# Patient Record
Sex: MALE | Race: BLACK OR AFRICAN AMERICAN | NOT HISPANIC OR LATINO | Employment: OTHER | URBAN - METROPOLITAN AREA
[De-identification: names, ages, dates, MRNs, and addresses within clinical notes are randomized per-mention and may not be internally consistent; named-entity substitution may affect disease eponyms.]

---

## 2017-04-11 ENCOUNTER — TELEPHONE (OUTPATIENT)
Dept: FAMILY MEDICINE | Facility: CLINIC | Age: 82
End: 2017-04-11

## 2017-04-11 NOTE — TELEPHONE ENCOUNTER
Spoke with patient and explained to him that no provider that has never seen him before is going to prescribe Zolpidem to him.  Advised that he is going to have to get his refills from the provider that he was established with until he can establish care with Dr. Berger.  Patient verbalizes understanding and states her thinks that doctor is in clinic tomorrow and he will call their office.

## 2017-04-11 NOTE — TELEPHONE ENCOUNTER
----- Message from Emily Moreno sent at 4/11/2017  3:05 PM CDT -----  Contact: call  [phone]  RX request - refill or new RX.  Is this a refill or new RX:  New   RX name and strength: zolpidem (AMBIEN) 10 mg Tab  Directions:   Is this a 30 day or 90 day RX:  30 day   Pharmacy name and phone #: [facility]  Phone [phone]  Comments:  Pt is scheduled for a np appt on 05/10/17

## 2017-05-10 ENCOUNTER — OFFICE VISIT (OUTPATIENT)
Dept: FAMILY MEDICINE | Facility: CLINIC | Age: 82
End: 2017-05-10
Payer: MEDICARE

## 2017-05-10 ENCOUNTER — LAB VISIT (OUTPATIENT)
Dept: LAB | Facility: HOSPITAL | Age: 82
End: 2017-05-10
Attending: INTERNAL MEDICINE
Payer: MEDICARE

## 2017-05-10 VITALS
SYSTOLIC BLOOD PRESSURE: 130 MMHG | WEIGHT: 156.5 LBS | TEMPERATURE: 98 F | HEIGHT: 65 IN | DIASTOLIC BLOOD PRESSURE: 70 MMHG | HEART RATE: 78 BPM | BODY MASS INDEX: 26.08 KG/M2

## 2017-05-10 DIAGNOSIS — E55.9 VITAMIN D DEFICIENCY: ICD-10-CM

## 2017-05-10 DIAGNOSIS — I25.10 CORONARY ARTERY DISEASE INVOLVING NATIVE CORONARY ARTERY OF NATIVE HEART WITHOUT ANGINA PECTORIS: ICD-10-CM

## 2017-05-10 DIAGNOSIS — Z00.00 ANNUAL PHYSICAL EXAM: Primary | ICD-10-CM

## 2017-05-10 DIAGNOSIS — I10 ESSENTIAL HYPERTENSION: ICD-10-CM

## 2017-05-10 DIAGNOSIS — G47.00 INSOMNIA, UNSPECIFIED TYPE: ICD-10-CM

## 2017-05-10 DIAGNOSIS — K59.00 CONSTIPATION, UNSPECIFIED CONSTIPATION TYPE: ICD-10-CM

## 2017-05-10 DIAGNOSIS — M17.11 PRIMARY OSTEOARTHRITIS OF RIGHT KNEE: ICD-10-CM

## 2017-05-10 DIAGNOSIS — Z01.00 EYE EXAM, ROUTINE: ICD-10-CM

## 2017-05-10 DIAGNOSIS — Z85.819 HISTORY OF THROAT CANCER: ICD-10-CM

## 2017-05-10 DIAGNOSIS — K21.9 GASTROESOPHAGEAL REFLUX DISEASE, ESOPHAGITIS PRESENCE NOT SPECIFIED: ICD-10-CM

## 2017-05-10 DIAGNOSIS — N40.0 BENIGN PROSTATIC HYPERPLASIA, PRESENCE OF LOWER URINARY TRACT SYMPTOMS UNSPECIFIED, UNSPECIFIED MORPHOLOGY: ICD-10-CM

## 2017-05-10 LAB
25(OH)D3+25(OH)D2 SERPL-MCNC: 25 NG/ML
ALBUMIN SERPL BCP-MCNC: 3.6 G/DL
ALP SERPL-CCNC: 152 U/L
ALT SERPL W/O P-5'-P-CCNC: 11 U/L
ANION GAP SERPL CALC-SCNC: 9 MMOL/L
ANISOCYTOSIS BLD QL SMEAR: SLIGHT
AST SERPL-CCNC: 23 U/L
BASOPHILS # BLD AUTO: 0.06 K/UL
BASOPHILS NFR BLD: 0.6 %
BILIRUB SERPL-MCNC: 0.5 MG/DL
BUN SERPL-MCNC: 17 MG/DL
BURR CELLS BLD QL SMEAR: ABNORMAL
CALCIUM SERPL-MCNC: 9.1 MG/DL
CHLORIDE SERPL-SCNC: 108 MMOL/L
CHOLEST/HDLC SERPL: 3.6 {RATIO}
CO2 SERPL-SCNC: 22 MMOL/L
CREAT SERPL-MCNC: 2.2 MG/DL
DIFFERENTIAL METHOD: ABNORMAL
EOSINOPHIL # BLD AUTO: 0.3 K/UL
EOSINOPHIL NFR BLD: 2.8 %
ERYTHROCYTE [DISTWIDTH] IN BLOOD BY AUTOMATED COUNT: 16.4 %
EST. GFR  (AFRICAN AMERICAN): 31.3 ML/MIN/1.73 M^2
EST. GFR  (NON AFRICAN AMERICAN): 27.1 ML/MIN/1.73 M^2
GIANT PLATELETS BLD QL SMEAR: PRESENT
GLUCOSE SERPL-MCNC: 94 MG/DL
HCT VFR BLD AUTO: 42.7 %
HDL/CHOLESTEROL RATIO: 28 %
HDLC SERPL-MCNC: 132 MG/DL
HDLC SERPL-MCNC: 37 MG/DL
HGB BLD-MCNC: 13.4 G/DL
LDLC SERPL CALC-MCNC: 69.4 MG/DL
LYMPHOCYTES # BLD AUTO: 1.1 K/UL
LYMPHOCYTES NFR BLD: 11 %
MCH RBC QN AUTO: 23.8 PG
MCHC RBC AUTO-ENTMCNC: 31.4 %
MCV RBC AUTO: 76 FL
MONOCYTES # BLD AUTO: 0.7 K/UL
MONOCYTES NFR BLD: 6.7 %
NEUTROPHILS # BLD AUTO: 7.6 K/UL
NEUTROPHILS NFR BLD: 78.9 %
NONHDLC SERPL-MCNC: 95 MG/DL
OVALOCYTES BLD QL SMEAR: ABNORMAL
PLATELET # BLD AUTO: 429 K/UL
PLATELET BLD QL SMEAR: ABNORMAL
PMV BLD AUTO: 11.3 FL
POIKILOCYTOSIS BLD QL SMEAR: SLIGHT
POTASSIUM SERPL-SCNC: 4 MMOL/L
PROT SERPL-MCNC: 6.6 G/DL
RBC # BLD AUTO: 5.63 M/UL
SODIUM SERPL-SCNC: 139 MMOL/L
TRIGL SERPL-MCNC: 128 MG/DL
WBC # BLD AUTO: 9.66 K/UL

## 2017-05-10 PROCEDURE — 3078F DIAST BP <80 MM HG: CPT | Mod: S$GLB,,, | Performed by: INTERNAL MEDICINE

## 2017-05-10 PROCEDURE — 3075F SYST BP GE 130 - 139MM HG: CPT | Mod: S$GLB,,, | Performed by: INTERNAL MEDICINE

## 2017-05-10 PROCEDURE — 1160F RVW MEDS BY RX/DR IN RCRD: CPT | Mod: S$GLB,,, | Performed by: INTERNAL MEDICINE

## 2017-05-10 PROCEDURE — 99204 OFFICE O/P NEW MOD 45 MIN: CPT | Mod: S$GLB,,, | Performed by: INTERNAL MEDICINE

## 2017-05-10 PROCEDURE — 99999 PR PBB SHADOW E&M-EST. PATIENT-LVL III: CPT | Mod: PBBFAC,,, | Performed by: INTERNAL MEDICINE

## 2017-05-10 PROCEDURE — 36415 COLL VENOUS BLD VENIPUNCTURE: CPT | Mod: PO

## 2017-05-10 PROCEDURE — 85025 COMPLETE CBC W/AUTO DIFF WBC: CPT

## 2017-05-10 PROCEDURE — 1126F AMNT PAIN NOTED NONE PRSNT: CPT | Mod: S$GLB,,, | Performed by: INTERNAL MEDICINE

## 2017-05-10 PROCEDURE — 80053 COMPREHEN METABOLIC PANEL: CPT

## 2017-05-10 PROCEDURE — 1159F MED LIST DOCD IN RCRD: CPT | Mod: S$GLB,,, | Performed by: INTERNAL MEDICINE

## 2017-05-10 PROCEDURE — 82306 VITAMIN D 25 HYDROXY: CPT

## 2017-05-10 PROCEDURE — 80061 LIPID PANEL: CPT

## 2017-05-10 RX ORDER — CLOPIDOGREL BISULFATE 75 MG/1
75 TABLET ORAL DAILY
Qty: 90 TABLET | Refills: 3 | Status: SHIPPED | OUTPATIENT
Start: 2017-05-10 | End: 2018-01-01 | Stop reason: SDUPTHER

## 2017-05-10 RX ORDER — BENAZEPRIL HYDROCHLORIDE 20 MG/1
20 TABLET ORAL DAILY
COMMUNITY
End: 2017-05-10 | Stop reason: SDUPTHER

## 2017-05-10 RX ORDER — BENAZEPRIL HYDROCHLORIDE 20 MG/1
20 TABLET ORAL DAILY
Qty: 90 TABLET | Refills: 3 | Status: ON HOLD | OUTPATIENT
Start: 2017-05-10 | End: 2018-01-01 | Stop reason: HOSPADM

## 2017-05-10 RX ORDER — OMEPRAZOLE 20 MG/1
20 CAPSULE, DELAYED RELEASE ORAL DAILY
COMMUNITY
Start: 2017-04-17 | End: 2017-06-09 | Stop reason: SDUPTHER

## 2017-05-10 RX ORDER — PRAVASTATIN SODIUM 40 MG/1
40 TABLET ORAL DAILY
COMMUNITY
End: 2017-05-10 | Stop reason: SDUPTHER

## 2017-05-10 RX ORDER — AMLODIPINE BESYLATE 10 MG/1
10 TABLET ORAL DAILY
Qty: 90 TABLET | Refills: 3 | Status: SHIPPED | OUTPATIENT
Start: 2017-05-10 | End: 2018-01-18 | Stop reason: SDUPTHER

## 2017-05-10 RX ORDER — ZOLPIDEM TARTRATE 5 MG/1
5 TABLET ORAL NIGHTLY PRN
Qty: 90 TABLET | Refills: 3 | Status: SHIPPED | OUTPATIENT
Start: 2017-05-10 | End: 2017-07-26 | Stop reason: ALTCHOICE

## 2017-05-10 RX ORDER — AMLODIPINE BESYLATE 10 MG/1
10 TABLET ORAL DAILY
COMMUNITY
End: 2017-05-10 | Stop reason: SDUPTHER

## 2017-05-10 RX ORDER — TAMSULOSIN HYDROCHLORIDE 0.4 MG/1
0.4 CAPSULE ORAL DAILY
Qty: 90 CAPSULE | Refills: 3 | Status: SHIPPED | OUTPATIENT
Start: 2017-05-10 | End: 2018-01-01 | Stop reason: SDUPTHER

## 2017-05-10 RX ORDER — METOPROLOL SUCCINATE 50 MG/1
50 TABLET, EXTENDED RELEASE ORAL DAILY
Qty: 90 TABLET | Refills: 3 | Status: SHIPPED | OUTPATIENT
Start: 2017-05-10 | End: 2018-01-01 | Stop reason: HOSPADM

## 2017-05-10 RX ORDER — PRAVASTATIN SODIUM 40 MG/1
40 TABLET ORAL DAILY
Qty: 90 TABLET | Refills: 3 | Status: SHIPPED | OUTPATIENT
Start: 2017-05-10 | End: 2018-01-01 | Stop reason: SDUPTHER

## 2017-05-10 NOTE — MR AVS SNAPSHOT
[facility]  [address] [facility], [address]  [facility] [address]  Phone: [phone]  Fax: [phone]                  Neville Anthony   5/10/2017 9:00 AM   Office Visit    Description:  Male : 1935   Provider:  Liberty Berger MD   Department:  [facility]           Reason for Visit     Annual Exam     Establish Care           Diagnoses this Visit        Comments    Annual physical exam    -  Primary     History of throat cancer         Coronary artery disease involving native coronary artery of native heart without angina pectoris         Insomnia, unspecified type         Primary osteoarthritis of right knee         Benign prostatic hyperplasia, presence of lower urinary tract symptoms unspecified, unspecified morphology         Essential hypertension         Gastroesophageal reflux disease, esophagitis presence not specified         Vitamin D deficiency         Eye exam, routine         Constipation, unspecified constipation type                To Do List           Goals (5 Years of Data)     None      Follow-Up and Disposition     Return for Follow up pending lab results and review of outside medical records.       These Medications        Disp Refills Start End    amlodipine (NORVASC) 10 MG tablet 90 tablet 3 5/10/2017 5/10/2018    Take 1 tablet (10 mg total) by mouth once daily. - Oral    [facility] - [address] [facility] Ph #: [phone]       benazepril (LOTENSIN) 20 MG tablet 90 tablet 3 5/10/2017 5/10/2018    Take 1 tablet (20 mg total) by mouth once daily. - Oral    Pharmacy: [facility] - [address] [facility] Ph #: [phone]       clopidogrel (PLAVIX) 75 mg tablet 90 tablet 3 5/10/2017 5/10/2018    Take 1 tablet (75 mg total) by mouth once daily. - Oral    Pharmacy: [facility]  - [address] #: [phone]       metoprolol succinate (TOPROL-XL) 50 MG 24 hr tablet 90 tablet 3 5/10/2017 5/10/2018    Take 1 tablet (50 mg total) by mouth once daily. - Oral    Pharmacy: [address] #: [phone]       pravastatin (PRAVACHOL) 40 MG tablet 90 tablet 3 5/10/2017 5/10/2018    Take 1 tablet (40 mg total) by mouth once daily. - Oral    Pharmacy: [address] #: [phone]       tamsulosin (FLOMAX) 0.4 mg Cp24 90 capsule 3 5/10/2017 5/10/2018    Take 1 capsule (0.4 mg total) by mouth once daily. - Oral    Pharmacy: [address] #: [phone]       zolpidem (AMBIEN) 5 MG Tab 90 tablet 3 5/10/2017 5/10/2018    Take 1 tablet (5 mg total) by mouth nightly as needed (insomnia). - Oral    Pharmacy: [address] #: [phone]         [facility] On [facility]     [facility] Nurse Care Line - 24/7 Assistance  Unless otherwise directed by your provider, please contact [facility], our nurse care line that is available for 24/7 assistance.     Registered nurses in the [facility] Center provide: appointment scheduling, clinical advisement, health education, and other advisory services.  Call: [phone] (toll free)               Medications           Message regarding Medications     Verify the changes and/or additions to your medication regime listed below are the same as discussed with your clinician today.  If any of these changes or additions are incorrect, please notify your healthcare provider.        START taking these NEW medications        Refills    amlodipine  (NORVASC) 10 MG tablet 3    Sig: Take 1 tablet (10 mg total) by mouth once daily.    Class: Normal    Route: Oral    benazepril (LOTENSIN) 20 MG tablet 3    Sig: Take 1 tablet (20 mg total) by mouth once daily.    Class: Normal    Route: Oral    pravastatin (PRAVACHOL) 40 MG tablet 3    Sig: Take 1 tablet (40 mg total) by mouth once daily.    Class: Normal    Route: Oral      CHANGE how you are taking these medications     Start Taking Instead of    clopidogrel (PLAVIX) 75 mg tablet clopidogrel (PLAVIX) 75 mg tablet    Dosage:  Take 1 tablet (75 mg total) by mouth once daily. Dosage:  Take 75 mg by mouth once daily.    Reason for Change:  Reorder     metoprolol succinate (TOPROL-XL) 50 MG 24 hr tablet metoprolol succinate (TOPROL-XL) 50 MG 24 hr tablet    Dosage:  Take 1 tablet (50 mg total) by mouth once daily. Dosage:  Take 50 mg by mouth once daily.    Reason for Change:  Reorder     tamsulosin (FLOMAX) 0.4 mg Cp24 tamsulosin (FLOMAX) 0.4 mg Cp24    Dosage:  Take 1 capsule (0.4 mg total) by mouth once daily. Dosage:  Take 0.4 mg by mouth once daily.    Reason for Change:  Reorder     zolpidem (AMBIEN) 5 MG Tab zolpidem (AMBIEN) 10 mg Tab    Dosage:  Take 1 tablet (5 mg total) by mouth nightly as needed (insomnia). Dosage:  Take 5 mg by mouth nightly as needed.    Reason for Change:  Reorder       STOP taking these medications     allopurinol (ZYLOPRIM) 100 MG tablet Take 100 mg by mouth once daily.    simvastatin (ZOCOR) 40 MG tablet Take 40 mg by mouth every evening.    pantoprazole (PROTONIX) 40 MG tablet Take 40 mg by mouth once daily.           Verify that the below list of medications is an accurate representation of the medications you are currently taking.  If none reported, the list may be blank. If incorrect, please contact your healthcare provider. Carry this list with you in case of emergency.           Current Medications     amlodipine (NORVASC) 10 MG tablet Take 1 tablet (10 mg total) by mouth once  "daily.    aspirin 81 MG Chew Take 81 mg by mouth once daily.    benazepril (LOTENSIN) 20 MG tablet Take 1 tablet (20 mg total) by mouth once daily.    clopidogrel (PLAVIX) 75 mg tablet Take 1 tablet (75 mg total) by mouth once daily.    metoprolol succinate (TOPROL-XL) 50 MG 24 hr tablet Take 1 tablet (50 mg total) by mouth once daily.    omeprazole (PRILOSEC) 20 MG capsule Take 20 mg by mouth once daily at 6am.    pravastatin (PRAVACHOL) 40 MG tablet Take 1 tablet (40 mg total) by mouth once daily.    tamsulosin (FLOMAX) 0.4 mg Cp24 Take 1 capsule (0.4 mg total) by mouth once daily.    zolpidem (AMBIEN) 5 MG Tab Take 1 tablet (5 mg total) by mouth nightly as needed (insomnia).           Clinical Reference Information           Your Vitals Were     BP Pulse Temp Height Weight BMI    130/70 78 98 °F (36.7 °C) 5' 5" (1.651 m) 71 kg (156 lb 8.4 oz) 26.05 kg/m2      Blood Pressure          Most Recent Value    BP  130/70      Allergies as of 5/10/2017     No Known Allergies      Immunizations Administered on Date of Encounter - 5/10/2017     None      Orders Placed During Today's Visit      Normal Orders This Visit    Ambulatory referral to Optometry     Future Labs/Procedures Expected by Expires    CBC auto differential  5/10/2017 7/9/2018    Comprehensive metabolic panel  5/10/2017 7/9/2018    Lipid panel  5/10/2017 7/9/2018    Vitamin D  5/10/2017 7/9/2018      Language Assistance Services     ATTENTION: Language assistance services are available, free of charge. Please call [phone].      ATENCIÓN: Si patiencela martha, tiene a whiting disposición servicios gratuitos de asistencia lingüística. Llame al [phone].     MAHAD Ý: N?u b?n nói Ti?ng Vi?t, có các d?ch v? h? tr? ngôn ng? mi?n phí dành cho b?n. G?i s? [phone].         [facility] complies with applicable Federal civil rights laws and does not discriminate on the basis of race, color, national origin, age, disability, or sex.        "

## 2017-05-10 NOTE — PROGRESS NOTES
Subjective:        Patient ID: Neville Cruz is a 82 y.o. male.    Chief Complaint: Annual Exam and Establish Care    HPI   Neville Cruz presents for annual exam and to establish care.  Pt is accompanied by his daughter, Clemencia.    1. ?kidney problem: Pt reports he saw a kidney doctor once since moving to [facility] 1 year ago.  Unsure if he has CKD or what the specific issue is.  His follow up appt is in 3 months.    2. CAD: s/p heart attacks and stenting; last stent placed in LAD 9/2016 by Dr. Reilly in [facility].  Pt currently asymptomatic.  He has been on Plavix a long time with increased bruising but otherwise no problems.  He reports when he held Plavix for a c-scope in the past, he had a heart attack.    3. R knee OA: Pt reports pain with prolonged walking and activity.  He walks with a cane.  He has seen ortho and TKA was recommended but pt needs to follow up with cardiology regarding cardiac clearance given recent stenting, Plavix.    4. ?Acid reflux: Pt reports no hx of heartburn or reflux sx.  He followed up with ENT in [place] due to his hx of throat cancer (dx 1997), had a scope and was prescribed omeprazole.  He is almost finished with the course prescribed.    Review of Systems   Constitutional: Positive for unexpected weight change (gradual weight gain). Negative for activity change (no regular exercise due to knee problem).   HENT: Positive for voice change (hoarse due to h/o throat surgery for cancer). Negative for ear pain, hearing loss, sore throat and trouble swallowing.    Eyes: Positive for visual disturbance (bifocal glasses need updating).   Respiratory: Negative for chest tightness and shortness of breath.    Cardiovascular: Negative for chest pain and leg swelling.   Gastrointestinal: Positive for constipation (~2 BMs per week, sometimes strains to go). Negative for abdominal pain, blood in stool and diarrhea.   Genitourinary: Negative for difficulty urinating and hematuria.    Musculoskeletal: Positive for arthralgias (R knee pain due to OA). Negative for back pain and joint swelling.   Skin: Negative for rash.   Neurological: Negative for dizziness and light-headedness.   Hematological: Bruises/bleeds easily (2/2 Plavix).   Psychiatric/Behavioral: Positive for sleep disturbance (chronic insomnia, took Ambien 10mg for years, no side effects, can't sleep without it). Negative for dysphoric mood. The patient is not nervous/anxious.            Objective:        Vitals:    05/10/17 0855   BP: 130/70   Pulse: 78   Temp: 98 °F (36.7 °C)     Physical Exam   Constitutional: He is oriented to person, place, and time. He appears well-developed and well-nourished. No distress.   HENT:   Head: Normocephalic and atraumatic.   Right Ear: External ear normal.   Left Ear: External ear normal.   Nose: Nose normal.   Mouth/Throat: Oropharynx is clear and moist.   - bilateral ear canals clear, tympanic membranes visualized - normal color and light reflex  - hoarse   Eyes: Conjunctivae and EOM are normal.   Neck: Normal range of motion. Neck supple.   Cardiovascular: Normal rate, regular rhythm and normal heart sounds.    No murmur heard.  Pulmonary/Chest: Effort normal and breath sounds normal. No respiratory distress. He has no wheezes.   Abdominal: Soft. He exhibits no distension. There is no tenderness. There is no guarding.   Musculoskeletal: He exhibits no edema.   Neurological: He is alert and oriented to person, place, and time.   Skin: Skin is warm and dry. No rash noted. No erythema.   Psychiatric: He has a normal mood and affect. His behavior is normal. Judgment and thought content normal.   Vitals reviewed.          Assessment:         1. Annual physical exam    2. History of throat cancer    3. Coronary artery disease involving native coronary artery of native heart without angina pectoris    4. Insomnia, unspecified type    5. Primary osteoarthritis of right knee    6. Benign prostatic  hyperplasia, presence of lower urinary tract symptoms unspecified, unspecified morphology    7. Essential hypertension    8. Gastroesophageal reflux disease, esophagitis presence not specified    9. Vitamin D deficiency    10. Constipation, unspecified constipation type    11. Eye exam, routine              Plan:         Neville was seen today for annual exam and establish care.    Diagnoses and all orders for this visit:    Annual physical exam  - fasting labs today  - Discussed vaccinations, will get medical records from PCP in [place].  Pt has not had shingles vaccine, recommended getting it from pharmacy due to Medicare.    History of throat cancer: No acute issues.  Recommend finishing course of omeprazole prescribed by ENT then stop.  If heartburn or reflux sx develop, try treating with OTC antacids, H2 antagonists before resuming PPI.    Coronary artery disease involving native coronary artery of native heart without angina pectoris: No acute issues; continue Benazepril 20mg qd, Metoprolol 50mg qd, Pravastatin 40mg qd and Plavix 75mg qd.  -     CBC auto differential; Future  -     Lipid panel; Future  -     benazepril (LOTENSIN) 20 MG tablet; Take 1 tablet (20 mg total) by mouth once daily.  -     clopidogrel (PLAVIX) 75 mg tablet; Take 1 tablet (75 mg total) by mouth once daily.  -     metoprolol succinate (TOPROL-XL) 50 MG 24 hr tablet; Take 1 tablet (50 mg total) by mouth once daily.  -     pravastatin (PRAVACHOL) 40 MG tablet; Take 1 tablet (40 mg total) by mouth once daily.    Insomnia, unspecified type: Decrease dose to Ambien 5mg qhs PRN given age.  -     zolpidem (AMBIEN) 5 MG Tab; Take 1 tablet (5 mg total) by mouth nightly as needed (insomnia).    Primary osteoarthritis of right knee: Encouraged pt to do physical activity as tolerated, use cane when ambulating.  Follow up with ortho.  Counseled pt if possible, would recommend postponing elective surgery until pt has been 1 year out from last  stent placement.  Discussed holding Plavix for surgery in the future vs bridging with heparin if pt is high risk for ACS or other VTE (pt reports having a heart attack the last time Plavix was held for screening c-scope).  Discuss this with cardiologist.    Benign prostatic hyperplasia, presence of lower urinary tract symptoms unspecified, unspecified morphology: Controlled w Flomax qd.  -     tamsulosin (FLOMAX) 0.4 mg Cp24; Take 1 capsule (0.4 mg total) by mouth once daily.    Essential hypertension: Well controlled w amlodipine 10mg qd, benazepril 20mg qd and metoprolol 50mg qd.  -     Comprehensive metabolic panel; Future  -     CBC auto differential; Future  -     Lipid panel; Future  -     amlodipine (NORVASC) 10 MG tablet; Take 1 tablet (10 mg total) by mouth once daily.  -     benazepril (LOTENSIN) 20 MG tablet; Take 1 tablet (20 mg total) by mouth once daily.  -     metoprolol succinate (TOPROL-XL) 50 MG 24 hr tablet; Take 1 tablet (50 mg total) by mouth once daily.    Gastroesophageal reflux disease, esophagitis presence not specified: Pt denies sx.  Finish PPI course then stop.  Take OTC antacids if heartburn or reflux sx start after stopping PPI.    Vitamin D deficiency: Check Vit D level.  -     Vitamin D; Future    Constipation, unspecified constipation type: Pt and his daughter report pt does not drink enough water or eat vegetables.  Recommend more water, dietary fiber.  Also consider fiber powder supplement, probiotic and stool softener OTC.    Eye exam, routine  -     Ambulatory referral to Optometry        Follow up pending lab results.

## 2017-05-12 ENCOUNTER — TELEPHONE (OUTPATIENT)
Dept: FAMILY MEDICINE | Facility: CLINIC | Age: 82
End: 2017-05-12

## 2017-05-12 DIAGNOSIS — R74.8 ELEVATED ALKALINE PHOSPHATASE LEVEL: ICD-10-CM

## 2017-05-12 DIAGNOSIS — N18.30 CKD (CHRONIC KIDNEY DISEASE) STAGE 3, GFR 30-59 ML/MIN: ICD-10-CM

## 2017-05-12 DIAGNOSIS — M89.9 DISORDER OF BONE: ICD-10-CM

## 2017-05-12 DIAGNOSIS — D50.9 MICROCYTIC ANEMIA: ICD-10-CM

## 2017-05-12 NOTE — TELEPHONE ENCOUNTER
Called and notified pt of lab results and the followin. Mild microcytic anemia, thrombocytosis: Pt thinks last c-scope was in the past few years but unsure.  Will check FIT and FOBT.  Stool kit left at  for pt to .    2. Chronic kidney disease: Unknown baseline.  Nephrology appt in August.  Copy of lab results left with stool kit so pt can bring to this appt.      3. Elevated alk phos: Check abd US and DXA.  Pt will check with his daughter, Clemencia, who is driving him to appts and call to schedule or schedule when he comes to drop off stool sample.    4. Vit D insufficiency: Pt taking daily Vit D supplement.

## 2017-05-29 ENCOUNTER — TELEPHONE (OUTPATIENT)
Dept: FAMILY MEDICINE | Facility: CLINIC | Age: 82
End: 2017-05-29

## 2017-05-29 ENCOUNTER — HOSPITAL ENCOUNTER (OUTPATIENT)
Dept: RADIOLOGY | Facility: OTHER | Age: 82
Discharge: HOME | End: 2017-05-29
Attending: INTERNAL MEDICINE
Payer: MEDICARE

## 2017-05-29 DIAGNOSIS — R74.8 ELEVATED ALKALINE PHOSPHATASE LEVEL: ICD-10-CM

## 2017-05-29 PROCEDURE — 76700 US EXAM ABDOM COMPLETE: CPT | Mod: TC

## 2017-05-29 PROCEDURE — 76700 US EXAM ABDOM COMPLETE: CPT | Mod: 26,,, | Performed by: RADIOLOGY

## 2017-05-29 NOTE — TELEPHONE ENCOUNTER
Please advise pt and/or his daughter that his abdominal US showed gallstones.  A bone density scan was also ordered that pt needs to schedule.

## 2017-05-30 ENCOUNTER — TELEPHONE (OUTPATIENT)
Dept: FAMILY MEDICINE | Facility: CLINIC | Age: 82
End: 2017-05-30

## 2017-05-30 NOTE — TELEPHONE ENCOUNTER
Left message on patient's daughter phone to call and schedule bone density test, number to referral coordinator given. All advised of gallstones on ultrasound. Per

## 2017-05-31 ENCOUNTER — OFFICE VISIT (OUTPATIENT)
Dept: OPTOMETRY | Facility: CLINIC | Age: 82
End: 2017-05-31
Payer: MEDICARE

## 2017-05-31 DIAGNOSIS — H52.4 PRESBYOPIA OU: ICD-10-CM

## 2017-05-31 DIAGNOSIS — H25.13 NUCLEAR SCLEROSIS, BILATERAL: Primary | ICD-10-CM

## 2017-05-31 DIAGNOSIS — Z13.5 GLAUCOMA SCREENING: ICD-10-CM

## 2017-05-31 PROCEDURE — 92015 DETERMINE REFRACTIVE STATE: CPT | Mod: S$GLB,,, | Performed by: OPTOMETRIST

## 2017-05-31 PROCEDURE — 92004 COMPRE OPH EXAM NEW PT 1/>: CPT | Mod: S$GLB,,, | Performed by: OPTOMETRIST

## 2017-05-31 PROCEDURE — 99999 PR PBB SHADOW E&M-EST. PATIENT-LVL II: CPT | Mod: PBBFAC,,, | Performed by: OPTOMETRIST

## 2017-05-31 NOTE — PROGRESS NOTES
HPI     CHANDRA: 1 year ago   Pt states no noticeable va changes. Denies f/f    No gtts     Last edited by Martha Wilburn on 5/31/2017  9:12 AM. (History)        ROS     Positive for: Cardiovascular    Negative for: Constitutional, Gastrointestinal, Neurological, Skin,   Genitourinary, Musculoskeletal, HENT, Endocrine, Eyes, Respiratory,   Psychiatric, Allergic/Imm, Heme/Lymph    Last edited by Andrea Powers, OD on 5/31/2017  9:21 AM. (History)        Assessment /Plan     For exam results, see Encounter Report.    Nuclear sclerosis, bilateral    Glaucoma screening    Presbyopia OU      Reduced VA 2 to Cat OU--pt wishes surgery    PLAN:    Surgical consult

## 2017-05-31 NOTE — LETTER
May 31, 2017      Liberty Berger MD  [address]  [facility] [address]           [place] [facility]  [address] [facility]  [place] [address]  Phone: [phone]  Fax: [phone]          Patient: Neville Cruz   MR Number: [id number]   YOB: 1935   Date of Visit: 5/31/2017       Dear Dr. Liberty Berger:    Thank you for referring Neville Cruz to me for evaluation. Attached you will find relevant portions of my assessment and plan of care.    If you have questions, please do not hesitate to call me. I look forward to following Neville Cruz along with you.    Sincerely,    Andrea Powers, OD    Enclosure  CC:  No Recipients    If you would like to receive this communication electronically, please contact [email or web address] or [phone] to request more information on [facility] access.    For providers and/or their staff who would like to refer a patient to [facility], please contact us through our one-stop-shop provider referral line, [facility], at [phone].    If you feel you have received this communication in error or would no longer like to receive these types of communications, please e-mail [email or web address]

## 2017-06-01 ENCOUNTER — HOSPITAL ENCOUNTER (OUTPATIENT)
Dept: RADIOLOGY | Facility: OTHER | Age: 82
Discharge: HOME | End: 2017-06-01
Attending: INTERNAL MEDICINE
Payer: MEDICARE

## 2017-06-01 DIAGNOSIS — R74.8 ELEVATED ALKALINE PHOSPHATASE LEVEL: ICD-10-CM

## 2017-06-01 DIAGNOSIS — M89.9 DISORDER OF BONE: ICD-10-CM

## 2017-06-01 PROCEDURE — 77080 DXA BONE DENSITY AXIAL: CPT | Mod: TC

## 2017-06-01 PROCEDURE — 77080 DXA BONE DENSITY AXIAL: CPT | Mod: 26,,, | Performed by: RADIOLOGY

## 2017-06-06 ENCOUNTER — TELEPHONE (OUTPATIENT)
Dept: FAMILY MEDICINE | Facility: CLINIC | Age: 82
End: 2017-06-06

## 2017-06-06 DIAGNOSIS — M81.0 AGE-RELATED OSTEOPOROSIS WITHOUT CURRENT PATHOLOGICAL FRACTURE: Primary | ICD-10-CM

## 2017-06-06 NOTE — TELEPHONE ENCOUNTER
Attempted to call daughter in regards to patient's bone scan results. Spoke to patient told him about endocrinology referral. Patient verbalizes understanding. Will wait for daughter to call to schedule appointment.

## 2017-06-07 ENCOUNTER — TELEPHONE (OUTPATIENT)
Dept: FAMILY MEDICINE | Facility: CLINIC | Age: 82
End: 2017-06-07

## 2017-06-07 NOTE — TELEPHONE ENCOUNTER
Left message with date and place of endocrinologist appointment and phone number to call if she needed more info regarding appointment.

## 2017-06-07 NOTE — TELEPHONE ENCOUNTER
----- Message from Emily Moreno sent at 6/7/2017 12:11 PM CDT -----  Contact: call daughter at [phone]  work  Patient is returning a phone call.  Who left a message for the patient: bull   Does patient know what this is regarding:  Phone number given earlier is not working  Comments: please call and leave voice mail with correct phone number

## 2017-06-07 NOTE — TELEPHONE ENCOUNTER
Patient's daughter referral to endocrinologist to seek treatment for osteoporosis.Ms. Haddad verbalizes understanding and will call referral coordinator to schedule.

## 2017-06-09 DIAGNOSIS — R10.13 DYSPEPSIA: Primary | ICD-10-CM

## 2017-06-09 DIAGNOSIS — R10.13 DYSPEPSIA: ICD-10-CM

## 2017-06-09 RX ORDER — OMEPRAZOLE 20 MG/1
CAPSULE, DELAYED RELEASE ORAL
Qty: 90 CAPSULE | Refills: 0 | Status: SHIPPED | OUTPATIENT
Start: 2017-06-09 | End: 2017-09-05 | Stop reason: SDUPTHER

## 2017-06-09 RX ORDER — OMEPRAZOLE 20 MG/1
20 CAPSULE, DELAYED RELEASE ORAL DAILY
Qty: 30 CAPSULE | Refills: 2 | Status: SHIPPED | OUTPATIENT
Start: 2017-06-09 | End: 2017-06-09 | Stop reason: SDUPTHER

## 2017-06-09 NOTE — TELEPHONE ENCOUNTER
----- Message from Jory Roldan sent at 6/9/2017  1:27 PM CDT -----  Pt Neville Cruz, wants to know if we can call in a prescription for omeprazole 20 mg.  Pt is having heart burn again and needs the medication called into [facility].  Please call pt @ [phone] to advise

## 2017-06-09 NOTE — TELEPHONE ENCOUNTER
Spoke w/Pharmacy, pt does not have refills on this medication. Please refill and send to pharmacy--will inform pt when completed. lov with Dr Berger 5/10/17 Thank you.

## 2017-06-19 ENCOUNTER — OFFICE VISIT (OUTPATIENT)
Dept: OPHTHALMOLOGY | Facility: CLINIC | Age: 82
End: 2017-06-19
Payer: MEDICARE

## 2017-06-19 DIAGNOSIS — H52.7 REFRACTIVE ERROR: ICD-10-CM

## 2017-06-19 DIAGNOSIS — H26.9 CORTICAL CATARACT OF BOTH EYES: ICD-10-CM

## 2017-06-19 DIAGNOSIS — H25.13 NUCLEAR SCLEROSIS, BILATERAL: Primary | ICD-10-CM

## 2017-06-19 DIAGNOSIS — I10 BENIGN ESSENTIAL HTN: ICD-10-CM

## 2017-06-19 PROCEDURE — 99999 PR PBB SHADOW E&M-EST. PATIENT-LVL II: CPT | Mod: PBBFAC,,, | Performed by: OPHTHALMOLOGY

## 2017-06-19 PROCEDURE — 92014 COMPRE OPH EXAM EST PT 1/>: CPT | Mod: S$GLB,,, | Performed by: OPHTHALMOLOGY

## 2017-06-19 PROCEDURE — 99499 UNLISTED E&M SERVICE: CPT | Mod: S$GLB,,, | Performed by: OPHTHALMOLOGY

## 2017-06-19 PROCEDURE — 92136 OPHTHALMIC BIOMETRY: CPT | Mod: RT,S$GLB,, | Performed by: OPHTHALMOLOGY

## 2017-06-19 RX ORDER — NEPAFENAC 3 MG/ML
1 SUSPENSION/ DROPS OPHTHALMIC DAILY
Qty: 3 ML | Refills: 1 | Status: SHIPPED | OUTPATIENT
Start: 2017-07-15 | End: 2017-08-14

## 2017-06-19 RX ORDER — DIFLUPREDNATE OPHTHALMIC 0.5 MG/ML
1 EMULSION OPHTHALMIC 4 TIMES DAILY
Qty: 5 ML | Refills: 1 | Status: SHIPPED | OUTPATIENT
Start: 2017-07-18 | End: 2017-08-17

## 2017-06-19 RX ORDER — OFLOXACIN 3 MG/ML
1 SOLUTION/ DROPS OPHTHALMIC 4 TIMES DAILY
Qty: 5 ML | Refills: 1 | Status: SHIPPED | OUTPATIENT
Start: 2017-07-15 | End: 2017-07-25

## 2017-06-19 NOTE — PROGRESS NOTES
Subjective:       Patient ID: Neville Cruz is a 82 y.o. male.    Chief Complaint: Cataract (Cataract Eval per Dr. Powers )    HPI  Review of Systems    Objective:      Physical Exam    Assessment:       1. Nuclear sclerosis, bilateral    2. Cortical cataract of both eyes    3. Benign essential HTN    4. Refractive error        Plan:       Visually significant cataract OD -Pt. Wants Sx.     Cataract OS- Not visually significant.  HTN-No retinopathy OU.  RE          CE OD 7/18/17 SN60WF 20.5.  Control HTN.

## 2017-06-20 ENCOUNTER — TELEPHONE (OUTPATIENT)
Dept: OPTOMETRY | Facility: CLINIC | Age: 82
End: 2017-06-20

## 2017-06-20 DIAGNOSIS — H25.11 NS (NUCLEAR SCLEROSIS), RIGHT: Primary | ICD-10-CM

## 2017-07-12 ENCOUNTER — TELEPHONE (OUTPATIENT)
Dept: OPHTHALMOLOGY | Facility: CLINIC | Age: 82
End: 2017-07-12

## 2017-07-15 NOTE — H&P
[facility]  History & Physical    Subjective:      Chief Complaint/Reason for Admission:     Neville Cruz is a 82 y.o. male.    Past Medical History:   Diagnosis Date    Age-related osteoporosis without current pathological fracture 6/6/2017    Benign prostatic hyperplasia 05/10/2017    Cataract     CKD (chronic kidney disease) stage 3, GFR 30-59 ml/min 5/12/2017    Constipation 5/10/2017    Coronary artery disease involving native coronary artery of native heart without angina pectoris 05/10/2017    cards/Dr. Melva Reilly    History of throat cancer 1997    s/p surgery, XRT    Hypertension     Insomnia     Primary osteoarthritis of right knee 05/10/2017    sees ortho, knee replacement recommended     Past Surgical History:   Procedure Laterality Date    CORONARY ANGIOPLASTY WITH STENT PLACEMENT      LCx 1999, mid RCA 2010, LAD 9/2016; cards/Dr. Melva Reilly    THROAT SURGERY      2/2 throat cancer     Family History   Problem Relation Age of Onset    Cancer Mother     Blindness Neg Hx     Amblyopia Neg Hx     Cataracts Neg Hx     Glaucoma Neg Hx     Macular degeneration Neg Hx     Retinal detachment Neg Hx     Strabismus Neg Hx      Social History   Substance Use Topics    Smoking status: Former Smoker     Types: Cigarettes    Smokeless tobacco: Never Used    Alcohol use No       No prescriptions prior to admission.     Review of patient's allergies indicates:  No Known Allergies     Review of Systems   Eyes: Positive for blurred vision.   All other systems reviewed and are negative.      Objective:      Vital Signs (Most Recent)       Vital Signs Range (Last 24H):       Physical Exam   Constitutional: He is oriented to person, place, and time. He appears well-developed and well-nourished.   HENT:   Head: Normocephalic.   Eyes: Conjunctivae and EOM are normal. Pupils are equal, round, and reactive to light.   Neck: Normal range of motion. Neck supple.    Cardiovascular: Normal rate.    Pulmonary/Chest: Effort normal and breath sounds normal.   Abdominal: Soft. Bowel sounds are normal.   Musculoskeletal: Normal range of motion.   Neurological: He is alert and oriented to person, place, and time.   Skin: Skin is warm.   Psychiatric: He has a normal mood and affect.       Data Review:    ECG:     Assessment:      Cataract OD.    Plan:    CE OD.

## 2017-07-18 ENCOUNTER — ANESTHESIA EVENT (OUTPATIENT)
Dept: SURGERY | Facility: HOSPITAL | Age: 82
End: 2017-07-18
Payer: MEDICARE

## 2017-07-18 ENCOUNTER — ANESTHESIA (OUTPATIENT)
Dept: SURGERY | Facility: HOSPITAL | Age: 82
End: 2017-07-18
Payer: MEDICARE

## 2017-07-18 ENCOUNTER — HOSPITAL ENCOUNTER (OUTPATIENT)
Facility: HOSPITAL | Age: 82
Discharge: HOME | End: 2017-07-18
Attending: OPHTHALMOLOGY | Admitting: OPHTHALMOLOGY
Payer: MEDICARE

## 2017-07-18 VITALS
SYSTOLIC BLOOD PRESSURE: 136 MMHG | DIASTOLIC BLOOD PRESSURE: 72 MMHG | OXYGEN SATURATION: 98 % | TEMPERATURE: 98 F | HEIGHT: 64 IN | HEART RATE: 62 BPM | WEIGHT: 155 LBS | RESPIRATION RATE: 16 BRPM | BODY MASS INDEX: 26.46 KG/M2

## 2017-07-18 DIAGNOSIS — H25.10 SENILE NUCLEAR SCLEROSIS: ICD-10-CM

## 2017-07-18 PROCEDURE — D9220A PRA ANESTHESIA: Mod: CRNA,,, | Performed by: NURSE ANESTHETIST, CERTIFIED REGISTERED

## 2017-07-18 PROCEDURE — 36000706: Performed by: OPHTHALMOLOGY

## 2017-07-18 PROCEDURE — 36000707: Performed by: OPHTHALMOLOGY

## 2017-07-18 PROCEDURE — 63600175 PHARM REV CODE 636 W HCPCS: Performed by: OPHTHALMOLOGY

## 2017-07-18 PROCEDURE — 25000003 PHARM REV CODE 250

## 2017-07-18 PROCEDURE — V2632 POST CHMBR INTRAOCULAR LENS: HCPCS | Performed by: OPHTHALMOLOGY

## 2017-07-18 PROCEDURE — 27201423 OPTIME MED/SURG SUP & DEVICES STERILE SUPPLY: Performed by: OPHTHALMOLOGY

## 2017-07-18 PROCEDURE — 37000008 HC ANESTHESIA 1ST 15 MINUTES: Performed by: OPHTHALMOLOGY

## 2017-07-18 PROCEDURE — C9447 INJ, PHENYLEPHRINE KETOROLAC: HCPCS | Performed by: OPHTHALMOLOGY

## 2017-07-18 PROCEDURE — 66984 XCAPSL CTRC RMVL W/O ECP: CPT | Mod: RT,,, | Performed by: OPHTHALMOLOGY

## 2017-07-18 PROCEDURE — 25000003 PHARM REV CODE 250: Performed by: OPHTHALMOLOGY

## 2017-07-18 PROCEDURE — 37000009 HC ANESTHESIA EA ADD 15 MINS: Performed by: OPHTHALMOLOGY

## 2017-07-18 PROCEDURE — D9220A PRA ANESTHESIA: Mod: ANES,,, | Performed by: ANESTHESIOLOGY

## 2017-07-18 PROCEDURE — 71000015 HC POSTOP RECOV 1ST HR: Performed by: OPHTHALMOLOGY

## 2017-07-18 PROCEDURE — 63600175 PHARM REV CODE 636 W HCPCS: Performed by: NURSE ANESTHETIST, CERTIFIED REGISTERED

## 2017-07-18 DEVICE — LENS 21.5 ACRYSOF: Type: IMPLANTABLE DEVICE | Site: EYE | Status: FUNCTIONAL

## 2017-07-18 RX ORDER — CYCLOPENTOLATE HYDROCHLORIDE 10 MG/ML
1 SOLUTION/ DROPS OPHTHALMIC
Status: COMPLETED | OUTPATIENT
Start: 2017-07-18 | End: 2017-07-18

## 2017-07-18 RX ORDER — PROPARACAINE HYDROCHLORIDE 5 MG/ML
1 SOLUTION/ DROPS OPHTHALMIC
Status: DISCONTINUED | OUTPATIENT
Start: 2017-07-18 | End: 2017-07-18 | Stop reason: HOSPADM

## 2017-07-18 RX ORDER — OFLOXACIN 3 MG/ML
1 SOLUTION/ DROPS OPHTHALMIC
Status: COMPLETED | OUTPATIENT
Start: 2017-07-18 | End: 2017-07-18

## 2017-07-18 RX ORDER — FENTANYL CITRATE 50 UG/ML
INJECTION, SOLUTION INTRAMUSCULAR; INTRAVENOUS
Status: DISCONTINUED | OUTPATIENT
Start: 2017-07-18 | End: 2017-07-18

## 2017-07-18 RX ORDER — TROPICAMIDE 10 MG/ML
1 SOLUTION/ DROPS OPHTHALMIC
Status: DISCONTINUED | OUTPATIENT
Start: 2017-07-18 | End: 2017-07-18 | Stop reason: HOSPADM

## 2017-07-18 RX ORDER — LIDOCAINE HYDROCHLORIDE 10 MG/ML
1 INJECTION, SOLUTION EPIDURAL; INFILTRATION; INTRACAUDAL; PERINEURAL ONCE
Status: COMPLETED | OUTPATIENT
Start: 2017-07-18 | End: 2017-07-18

## 2017-07-18 RX ORDER — ACETAMINOPHEN 325 MG/1
650 TABLET ORAL EVERY 4 HOURS PRN
Status: DISCONTINUED | OUTPATIENT
Start: 2017-07-18 | End: 2017-07-18 | Stop reason: HOSPADM

## 2017-07-18 RX ORDER — OFLOXACIN 3 MG/ML
SOLUTION/ DROPS OPHTHALMIC
Status: DISCONTINUED | OUTPATIENT
Start: 2017-07-18 | End: 2017-07-18 | Stop reason: HOSPADM

## 2017-07-18 RX ORDER — OFLOXACIN 3 MG/ML
SOLUTION/ DROPS OPHTHALMIC
Status: COMPLETED
Start: 2017-07-18 | End: 2017-07-18

## 2017-07-18 RX ORDER — LIDOCAINE HYDROCHLORIDE 40 MG/ML
INJECTION, SOLUTION RETROBULBAR
Status: DISCONTINUED | OUTPATIENT
Start: 2017-07-18 | End: 2017-07-18 | Stop reason: HOSPADM

## 2017-07-18 RX ORDER — PHENYLEPHRINE HYDROCHLORIDE 25 MG/ML
1 SOLUTION/ DROPS OPHTHALMIC
Status: DISCONTINUED | OUTPATIENT
Start: 2017-07-18 | End: 2017-07-18 | Stop reason: HOSPADM

## 2017-07-18 RX ORDER — SODIUM CHLORIDE 9 MG/ML
INJECTION, SOLUTION INTRAVENOUS CONTINUOUS
Status: DISCONTINUED | OUTPATIENT
Start: 2017-07-18 | End: 2017-07-18 | Stop reason: HOSPADM

## 2017-07-18 RX ORDER — MIDAZOLAM HYDROCHLORIDE 1 MG/ML
INJECTION, SOLUTION INTRAMUSCULAR; INTRAVENOUS
Status: DISCONTINUED | OUTPATIENT
Start: 2017-07-18 | End: 2017-07-18

## 2017-07-18 RX ORDER — PREDNISOLONE ACETATE 10 MG/ML
SUSPENSION/ DROPS OPHTHALMIC
Status: DISCONTINUED
Start: 2017-07-18 | End: 2017-07-18 | Stop reason: HOSPADM

## 2017-07-18 RX ORDER — HYDROCODONE BITARTRATE AND ACETAMINOPHEN 5; 325 MG/1; MG/1
1 TABLET ORAL EVERY 4 HOURS PRN
Status: DISCONTINUED | OUTPATIENT
Start: 2017-07-18 | End: 2017-07-18 | Stop reason: HOSPADM

## 2017-07-18 RX ORDER — PREDNISOLONE ACETATE 10 MG/ML
SUSPENSION/ DROPS OPHTHALMIC
Status: DISCONTINUED | OUTPATIENT
Start: 2017-07-18 | End: 2017-07-18 | Stop reason: HOSPADM

## 2017-07-18 RX ORDER — ACETAMINOPHEN 325 MG/1
TABLET ORAL
Status: DISCONTINUED
Start: 2017-07-18 | End: 2017-07-18 | Stop reason: HOSPADM

## 2017-07-18 RX ORDER — LIDOCAINE HYDROCHLORIDE 40 MG/ML
INJECTION, SOLUTION RETROBULBAR
Status: DISCONTINUED
Start: 2017-07-18 | End: 2017-07-18 | Stop reason: HOSPADM

## 2017-07-18 RX ADMIN — FENTANYL CITRATE 50 MCG: 50 INJECTION, SOLUTION INTRAMUSCULAR; INTRAVENOUS at 09:07

## 2017-07-18 RX ADMIN — FENTANYL CITRATE 25 MCG: 50 INJECTION, SOLUTION INTRAMUSCULAR; INTRAVENOUS at 09:07

## 2017-07-18 RX ADMIN — MIDAZOLAM HYDROCHLORIDE 1 MG: 1 INJECTION, SOLUTION INTRAMUSCULAR; INTRAVENOUS at 09:07

## 2017-07-18 RX ADMIN — TROPICAMIDE 1 DROP: 10 SOLUTION/ DROPS OPHTHALMIC at 08:07

## 2017-07-18 RX ADMIN — OFLOXACIN 1 DROP: 3 SOLUTION/ DROPS OPHTHALMIC at 08:07

## 2017-07-18 RX ADMIN — TROPICAMIDE 1 DROP: 10 SOLUTION/ DROPS OPHTHALMIC at 09:07

## 2017-07-18 RX ADMIN — CYCLOPENTOLATE HYDROCHLORIDE 1 DROP: 10 SOLUTION/ DROPS OPHTHALMIC at 08:07

## 2017-07-18 RX ADMIN — OFLOXACIN 1 DROP: 3 SOLUTION/ DROPS OPHTHALMIC at 09:07

## 2017-07-18 RX ADMIN — PROPARACAINE HYDROCHLORIDE 1 DROP: 5 SOLUTION/ DROPS OPHTHALMIC at 08:07

## 2017-07-18 RX ADMIN — ACETAMINOPHEN 650 MG: 325 TABLET ORAL at 10:07

## 2017-07-18 RX ADMIN — OFLOXACIN 1 DROP: 3 SOLUTION OPHTHALMIC at 08:07

## 2017-07-18 RX ADMIN — PHENYLEPHRINE HYDROCHLORIDE 1 DROP: 25 SOLUTION/ DROPS OPHTHALMIC at 08:07

## 2017-07-18 RX ADMIN — FENTANYL CITRATE 25 MCG: 50 INJECTION, SOLUTION INTRAMUSCULAR; INTRAVENOUS at 10:07

## 2017-07-18 RX ADMIN — LIDOCAINE HYDROCHLORIDE 0.1 MG: 10 INJECTION, SOLUTION EPIDURAL; INFILTRATION; INTRACAUDAL; PERINEURAL at 08:07

## 2017-07-18 RX ADMIN — CYCLOPENTOLATE HYDROCHLORIDE 1 DROP: 10 SOLUTION/ DROPS OPHTHALMIC at 09:07

## 2017-07-18 RX ADMIN — PHENYLEPHRINE HYDROCHLORIDE 1 DROP: 25 SOLUTION/ DROPS OPHTHALMIC at 09:07

## 2017-07-18 RX ADMIN — SODIUM CHLORIDE: 0.9 INJECTION, SOLUTION INTRAVENOUS at 09:07

## 2017-07-18 NOTE — ANESTHESIA POSTPROCEDURE EVALUATION
"Anesthesia Post Evaluation    Patient: Neville Cruz    Procedure(s) Performed: Procedure(s) (LRB):  PHACOEMULSIFICATION-ASPIRATION-CATARACT (Right)  INSERTION-INTRAOCULAR LENS (IOL) (Right)    Final Anesthesia Type: MAC  Patient location during evaluation: PACU  Patient participation: Yes- Able to Participate  Level of consciousness: awake and alert and oriented  Post-procedure vital signs: reviewed and stable  Pain management: adequate  Airway patency: patent  PONV status at discharge: No PONV  Anesthetic complications: no      Cardiovascular status: blood pressure returned to baseline  Respiratory status: unassisted, spontaneous ventilation and room air  Hydration status: euvolemic  Follow-up not needed.        Visit Vitals  /72   Pulse 62   Temp 36.7 °C (98 °F) (Temporal)   Resp 16   Ht 5' 4" (1.626 m)   Wt 70.3 kg (155 lb)   SpO2 98%   BMI 26.61 kg/m²       Pain/Bridgett Score: Pain Assessment Performed: Yes (7/18/2017 10:52 AM)  Presence of Pain: complains of pain/discomfort (7/18/2017 10:52 AM)  Pain Rating Prior to Med Admin: 3 (7/18/2017 11:00 AM)  Pain Rating Post Med Admin: 1 (7/18/2017 11:00 AM)  Bridgett Score: 10 (7/18/2017 10:32 AM)      "

## 2017-07-18 NOTE — PLAN OF CARE
D/C instructions given to pt and family. Pt. Tolerating po fluids and denies pain and n/v at this time. IV dc'd. VSS. Family at bs for d/c.

## 2017-07-18 NOTE — DISCHARGE INSTRUCTIONS
Discharge Instructions for Cataract Surgery  A surgeon removed the cloudy lens in your eye and replaced it with a clear man-made lens. Be sure to have an adult family member or friend drive you home after surgery. Heres what you can expect following surgery and tips for a healthy recovery.  It is normal to have the following:  · Bruised or bloodshot eye for 7 days  · Itching and mild discomfort for several days  · Some fluid discharge  · Sensitivity to light  · Scratchy, sandlike feeling in the eye for 2 weeks  · Feeling tired, especially during the first 24 hours  Activity level  · Do not drive for 2 days or as instructed by your doctor.  · Do not drink alcohol for at least 24 hours.  · Avoid bending at the waist to  objects or lifting anything heavy for 2 days.  · Relax for the first 24 hours after surgery. Watching TV and reading are OK and wont harm your eye.  Eye protection  · Do not rub or press on your eye.  · Sleep on your back or on your unoperated side for 2 nights.  · If instructed, wear a bandage over your eye for 2 days and 2 nights.  · If instructed, wear a shield to protect your eye for 2 days and 2 nights.  Using eyedrops  You may be given special eyedrops or ointment. Here is one way to use eyedrops:  · Tilt your head back.  · Pull your bottom eyelid down.  · Squeeze one drop into your eye. Do not touch your eye with the bottle tip.  · Close your eyes for a few seconds.  · If you need more than one drop, wait a few minutes before adding the next one.   Call your doctor right away if you have any of the following:  · Bleeding or discharge from the eye  · Your vision suddenly becomes worse  · Pain medicine you are told to take does not ease your pain  · Nausea or vomiting  · Chills or fever over 100.4°F (39.1°C)   Date Last Reviewed: 5/30/2015  © 7747-4100 [employer]. [address] [place] [address], [place] [address]. All rights reserved. This information is not intended as a  substitute for professional medical care. Always follow your healthcare professional's instructions.

## 2017-07-18 NOTE — ANESTHESIA PREPROCEDURE EVALUATION
07/18/2017  Neville Cruz is a 82 y.o., male.    Anesthesia Evaluation    I have reviewed the Patient Summary Reports.     I have reviewed the Medications.     Review of Systems  Anesthesia Hx:  No problems with previous Anesthesia  History of prior surgery of interest to airway management or planning:  Denies Personal Hx of Anesthesia complications.   Cardiovascular:   Exercise tolerance: poor Hypertension Past MI CAD  CABG/stent     Pulmonary:  Pulmonary Normal    Renal/:   Chronic Renal Disease    Hepatic/GI:  Hepatic/GI Normal    Musculoskeletal:   Arthritis     Neurological:  Neurology Normal    Endocrine:  Endocrine Normal        Physical Exam  General:  Well nourished    Airway/Jaw/Neck:  Airway Findings: Mouth Opening: Normal General Airway Assessment: Adult  Mallampati: II  TM Distance: Normal, at least 6 cm       Chest/Lungs:  Chest/Lungs Clear    Heart/Vascular:  Heart Findings: Normal            Anesthesia Plan  Type of Anesthesia, risks & benefits discussed:  Anesthesia Type:  MAC  Patient's Preference:   Intra-op Monitoring Plan:   Intra-op Monitoring Plan Comments:   Post Op Pain Control Plan:   Post Op Pain Control Plan Comments:   Induction:   IV  Beta Blocker:  Patient is on a Beta-Blocker and has received one dose within the past 24 hours (No further documentation required).       Informed Consent: Patient understands risks and agrees with Anesthesia plan.  Questions answered. Anesthesia consent signed with patient.  ASA Score: 3     Day of Surgery Review of History & Physical:    H&P update referred to the surgeon.         Ready For Surgery From Anesthesia Perspective.

## 2017-07-18 NOTE — TRANSFER OF CARE
"Anesthesia Transfer of Care Note    Patient: Neville Cruz    Procedure(s) Performed: Procedure(s) (LRB):  PHACOEMULSIFICATION-ASPIRATION-CATARACT (Right)  INSERTION-INTRAOCULAR LENS (IOL) (Right)    Patient location: PACU    Anesthesia Type: MAC    Transport from OR: Transported from OR on room air with adequate spontaneous ventilation    Post pain: adequate analgesia    Post assessment: no apparent anesthetic complications and tolerated procedure well    Post vital signs: stable    Level of consciousness: oriented, alert and awake    Complications: none    Transfer of care protocol was followed      Last vitals:   Visit Vitals  /71 (BP Location: Right arm, Patient Position: Lying, BP Method: Automatic)   Pulse 64   Temp 36.6 °C (97.9 °F) (Oral)   Resp 16   Ht 5' 4" (1.626 m)   Wt 70.3 kg (155 lb)   SpO2 98%   BMI 26.61 kg/m²     "

## 2017-07-18 NOTE — BRIEF OP NOTE
Operative Note     SUMMARY     Surgery Date: 7/18/2017    Surgeon(s) and Role:      Campos Durán MD - Primary    Pre-op Diagnosis:  Nuclear sclerosis     Post-op/ Diagnosis:  Same    Final Diagnosis: Cataract    Procedure(s) (LRB):  PHACOEMULSIFICATION-ASPIRATION-CATARACT   INSERTION-INTRAOCULAR LENS (IOL)     Anesthesia: Monitored Anesthesia Care    Findings/Key Components:  Cataract    Outcome: Tolerated procedure well    Estimated Blood Loss: None         Specimens     None          Follow-up:  Tomorrow in clinic      Discharge Note      SUMMARY     Admit Date: 7/18/2017    Attending Physician: Campos Durán MD    Discharge Physician: Campos Durán MD    Discharge Date: 7/18/2017    Final Diagnosis: Cataract    Outcome: Tolerated procedure well    Disposition: Discharge to Home.    Medications:       Neville Cruz   Home Medication Instructions BABAR:[id number]    Printed on:07/18/17 1026   Medication Information                      amlodipine (NORVASC) 10 MG tablet  Take 1 tablet (10 mg total) by mouth once daily.             aspirin 81 MG Chew  Take 81 mg by mouth once daily.             benazepril (LOTENSIN) 20 MG tablet  Take 1 tablet (20 mg total) by mouth once daily.             clopidogrel (PLAVIX) 75 mg tablet  Take 1 tablet (75 mg total) by mouth once daily.             difluprednate (DUREZOL) 0.05 % Drop ophthalmic solution  Place 1 drop into the right eye 4 (four) times daily. For 30 days             ERGOCALCIFEROL, VITAMIN D2, (VITAMIN D ORAL)  Take 50,000 Int'l Units by mouth.             ILEVRO 0.3 % DrpS  Place 1 drop into both eyes once daily. For 30 days             metoprolol succinate (TOPROL-XL) 50 MG 24 hr tablet  Take 1 tablet (50 mg total) by mouth once daily.             ofloxacin (OCUFLOX) 0.3 % ophthalmic solution  Place 1 drop into the right eye 4 (four) times daily.             omeprazole (PRILOSEC) 20 MG capsule  TAKE 1 CAPSULE BY MOUTH ONCE DAILY AT 6  AM.             pravastatin (PRAVACHOL) 40 MG tablet  Take 1 tablet (40 mg total) by mouth once daily.             tamsulosin (FLOMAX) 0.4 mg Cp24  Take 1 capsule (0.4 mg total) by mouth once daily.             zolpidem (AMBIEN) 5 MG Tab  Take 1 tablet (5 mg total) by mouth nightly as needed (insomnia).                   Patient Discharge Instructions:     Keep Soler Shield over operated eye when not using drops.     DIET:  Regular    Activity: No heavy lifting or bending X 1 week.    Follow-up:  Tomorrow in clinic

## 2017-07-19 ENCOUNTER — OFFICE VISIT (OUTPATIENT)
Dept: OPHTHALMOLOGY | Facility: CLINIC | Age: 82
End: 2017-07-19
Payer: MEDICARE

## 2017-07-19 VITALS — SYSTOLIC BLOOD PRESSURE: 100 MMHG | HEART RATE: 67 BPM | DIASTOLIC BLOOD PRESSURE: 56 MMHG

## 2017-07-19 DIAGNOSIS — Z98.890 POST-OPERATIVE STATE: Primary | ICD-10-CM

## 2017-07-19 PROCEDURE — 99999 PR PBB SHADOW E&M-EST. PATIENT-LVL III: CPT | Mod: PBBFAC,,, | Performed by: OPHTHALMOLOGY

## 2017-07-19 PROCEDURE — 99024 POSTOP FOLLOW-UP VISIT: CPT | Mod: S$GLB,,, | Performed by: OPHTHALMOLOGY

## 2017-07-19 NOTE — OP NOTE
DATE OF PROCEDURE:  07/18/2017.    SURGEON:  Campos Durán M.D.    PREOPERATIVE DIAGNOSIS:  Nuclear sclerotic cataract, right eye.    POSTOPERATIVE DIAGNOSIS:  Nuclear sclerotic cataract, right eye.    PROCEDURE:  Clear corneal phacoemulsification with posterior chamber intraocular   lens implant, right eye.    ANESTHESIA:  Monitored anesthesia care with 2% Xylocaine jelly topically, 1%   free lidocaine topically and intrachamberly.    PROCEDURE IN DETAIL:  After the appropriate preoperative consent was obtained,   the patient had the 2% Xylocaine jelly applied to the cornea.  The patient was   then brought to the operating room and placed into the supine position.  The   right periorbit was then prepped and draped in the usual sterile ophthalmic   fashion.  A lid speculum was then inserted into the right eye.  Several drops of   the 1% lidocaine were placed onto the right cornea.  The 1% lidocaine was also   applied to the perilimbal region with the Weck-gautam sponge.  Using the 0.12-mm   forceps and the Super Sharp blade, a paracentesis site was made at the 12   o'clock position.  Approximately 0.5 mL of the 1% lidocaine was injected into   the anterior chamber.  Next, Viscoat was injected into the anterior chamber   through the paracentesis site.  The 2.75-mm keratome and the cyclodialysis   spatula were used to create a 2.75-mm clear corneal temporal groove.  The   cystitomy needle and Utrata forceps were then used to create a continuous tear   360-degree capsulorrhexis.  BSS in a cannula was then used for hydrodissection.    Phacoemulsification then proceeded in a stop-and-chop fashion without any   complications.  Another 0.5 mL of the 1% lidocaine was injected into the   anterior chamber.  The curved tip irrigation aspiration handpiece was then used   to remove the residual cortical material from the capsular bag.  Again, the 1%   lidocaine was applied to the perilimbal region with the Weck-gautam sponge.   Healon   GV was then injected into the anterior chamber and capsular bag.  An [employer] intraocular lens model SN60WF, 21.5 diopters in power, and serial   #[id number] was injected into the capsular bag with the lens injector.  The   Sinskey hook was used to position the lens into its proper place.  The residual   viscoelastic material was removed from the anterior chamber and capsular bag   with the curved tip irrigation aspiration handpiece.  Both wounds were hydrated   with BSS on a cannula.  Both wounds were checked and found to be watertight.    The lid speculum was then removed from the right eye.  The patient then had 1   drop of Vigamox ophthalmic drop and 1 drop of Econopred +1% ophthalmic drop   instilled onto the right cornea.  The eye was then shielded.  The patient   tolerated the procedure well and was then brought to the recovery room in good   condition.      DANELLE  dd: 07/18/2017 19:40:02 (CDT)  td: 07/18/2017 20:41:31 (CDT)  Doc ID   #[id number]  Job ID #[id number]    CC:

## 2017-07-19 NOTE — PROGRESS NOTES
Subjective:       Patient ID: Neville Cruz is a 82 y.o. male.    Chief Complaint: Post-op Evaluation (1 day po od)    HPI  Review of Systems    Objective:      Physical Exam    Assessment:       1. Post-operative state        Plan:       S/p CE OD- Doing well.             CPM OD.  RTC 1 wk.

## 2017-07-26 ENCOUNTER — OFFICE VISIT (OUTPATIENT)
Dept: OPHTHALMOLOGY | Facility: CLINIC | Age: 82
End: 2017-07-26
Payer: MEDICARE

## 2017-07-26 DIAGNOSIS — G47.00 INSOMNIA, UNSPECIFIED TYPE: Primary | ICD-10-CM

## 2017-07-26 DIAGNOSIS — Z98.890 POST-OPERATIVE STATE: Primary | ICD-10-CM

## 2017-07-26 DIAGNOSIS — H25.12 NUCLEAR SCLEROSIS, LEFT: ICD-10-CM

## 2017-07-26 PROCEDURE — 99999 PR PBB SHADOW E&M-EST. PATIENT-LVL II: CPT | Mod: PBBFAC,,, | Performed by: OPHTHALMOLOGY

## 2017-07-26 PROCEDURE — 99024 POSTOP FOLLOW-UP VISIT: CPT | Mod: S$GLB,,, | Performed by: OPHTHALMOLOGY

## 2017-07-26 RX ORDER — ZOLPIDEM TARTRATE 6.25 MG/1
6.25 TABLET, FILM COATED, EXTENDED RELEASE ORAL NIGHTLY PRN
Qty: 30 TABLET | Refills: 2 | Status: SHIPPED | OUTPATIENT
Start: 2017-07-26 | End: 2017-11-11 | Stop reason: SDUPTHER

## 2017-07-26 RX ORDER — ZOLPIDEM TARTRATE 6.25 MG/1
6.25 TABLET, FILM COATED, EXTENDED RELEASE ORAL NIGHTLY PRN
Qty: 30 TABLET | Refills: 1 | Status: CANCELLED | OUTPATIENT
Start: 2017-07-26 | End: 2018-01-24

## 2017-07-26 NOTE — TELEPHONE ENCOUNTER
----- Message from Emily Moreno sent at 7/26/2017  8:15 AM CDT -----  Contact: call  daughter Clemencia Haddad  [phone]  Calling to request his zolpidem (AMBIEN) 5 MG Tab be increased by to the 10 mg, states that he is not sleeping well with the 5 mg dosage

## 2017-07-26 NOTE — TELEPHONE ENCOUNTER
----- Message from Emily Moreno sent at 7/26/2017  1:24 PM CDT -----  Contact: tiffany daughter  [phone] ext [phone]  Patient is returning a phone call.  Who left a message for the patient: bull   Does patient know what this is regarding:  Her dads medication  Comments:

## 2017-07-26 NOTE — TELEPHONE ENCOUNTER
Patient's daughter Clemencia says they would like to start the Ambien 6.25mg prn that  offered to send in.

## 2017-07-26 NOTE — TELEPHONE ENCOUNTER
----- Message from Paul Aguirre sent at 7/26/2017 12:18 PM CDT -----  Contact: Clemencia Stone daughter at [phone]  Daughter is returning a phone call.

## 2017-07-26 NOTE — TELEPHONE ENCOUNTER
----- Message from Paul Aguirre sent at 7/26/2017 12:18 PM CDT -----  Contact: Clemencia Stone daughter at [phone] ext [phone]  Daughter is returning a phone call.

## 2017-07-26 NOTE — TELEPHONE ENCOUNTER
Ambien 6.25mg sent to pharmacy, 30 days with 2 refills.  Let us know if this is better and I will change to 90 day supply with more refills.

## 2017-07-26 NOTE — TELEPHONE ENCOUNTER
----- Message from Paul Aguirre sent at 7/26/2017 12:18 PM CDT -----  Contact: Clemencia Stone daughter at [phone] ext [phone]  Daughter is returning a phone call.

## 2017-07-26 NOTE — PROGRESS NOTES
Subjective:       Patient ID: Neville Cruz is a 82 y.o. male.    Chief Complaint: Post-op Evaluation (1 week po od )    HPI  Review of Systems    Objective:      Physical Exam    Assessment:       1. Post-operative state    2. Nuclear sclerosis, left        Plan:       S/p CE OD- Doing well.     Cataract OS- Not visually significant.        Taper gtts OD.  RTC 3 wks.

## 2017-08-16 ENCOUNTER — OFFICE VISIT (OUTPATIENT)
Dept: OPHTHALMOLOGY | Facility: CLINIC | Age: 82
End: 2017-08-16
Payer: MEDICARE

## 2017-08-16 DIAGNOSIS — H25.12 NUCLEAR SCLEROSIS, LEFT: ICD-10-CM

## 2017-08-16 DIAGNOSIS — Z98.890 POST-OPERATIVE STATE: Primary | ICD-10-CM

## 2017-08-16 DIAGNOSIS — H52.7 REFRACTIVE ERROR: ICD-10-CM

## 2017-08-16 PROCEDURE — 99999 PR PBB SHADOW E&M-EST. PATIENT-LVL II: CPT | Mod: PBBFAC,,, | Performed by: OPHTHALMOLOGY

## 2017-08-16 PROCEDURE — 99024 POSTOP FOLLOW-UP VISIT: CPT | Mod: S$GLB,,, | Performed by: OPHTHALMOLOGY

## 2017-08-16 RX ORDER — ZOLPIDEM TARTRATE 5 MG/1
TABLET ORAL
COMMUNITY
Start: 2017-08-09 | End: 2017-10-03 | Stop reason: SDUPTHER

## 2017-08-16 NOTE — PROGRESS NOTES
Subjective:       Patient ID: Neville Cruz is a 82 y.o. male.    Chief Complaint: PO PC IOL OD    HPI  Review of Systems    Objective:      Physical Exam    Assessment:       1. Post-operative state    2. Nuclear sclerosis, left    3. Refractive error        Plan:       S/p CE OD- Doing well.     Cataract OS- Not visually significant.  RE-Pt wants MRx.        Give MRx.  RTC Dr Powers in 6 mos.

## 2017-09-05 DIAGNOSIS — R10.13 DYSPEPSIA: ICD-10-CM

## 2017-09-05 RX ORDER — OMEPRAZOLE 20 MG/1
CAPSULE, DELAYED RELEASE ORAL
Qty: 90 CAPSULE | Refills: 2 | Status: SHIPPED | OUTPATIENT
Start: 2017-09-05 | End: 2018-01-01 | Stop reason: SDUPTHER

## 2017-09-18 ENCOUNTER — OFFICE VISIT (OUTPATIENT)
Dept: INTERNAL MEDICINE | Facility: CLINIC | Age: 82
End: 2017-09-18
Payer: MEDICARE

## 2017-09-18 ENCOUNTER — OUTPATIENT CASE MANAGEMENT (OUTPATIENT)
Dept: ADMINISTRATIVE | Facility: OTHER | Age: 82
End: 2017-09-18

## 2017-09-18 VITALS
BODY MASS INDEX: 29.07 KG/M2 | HEART RATE: 66 BPM | TEMPERATURE: 99 F | WEIGHT: 154 LBS | SYSTOLIC BLOOD PRESSURE: 120 MMHG | HEIGHT: 61 IN | DIASTOLIC BLOOD PRESSURE: 62 MMHG

## 2017-09-18 DIAGNOSIS — I10 BENIGN ESSENTIAL HTN: ICD-10-CM

## 2017-09-18 DIAGNOSIS — D50.9 MICROCYTIC ANEMIA: ICD-10-CM

## 2017-09-18 DIAGNOSIS — I25.10 CORONARY ARTERY DISEASE INVOLVING NATIVE CORONARY ARTERY OF NATIVE HEART WITHOUT ANGINA PECTORIS: ICD-10-CM

## 2017-09-18 DIAGNOSIS — E55.9 VITAMIN D DEFICIENCY: ICD-10-CM

## 2017-09-18 DIAGNOSIS — M17.11 PRIMARY OSTEOARTHRITIS OF RIGHT KNEE: ICD-10-CM

## 2017-09-18 DIAGNOSIS — Z00.00 ENCOUNTER FOR PREVENTIVE HEALTH EXAMINATION: Primary | ICD-10-CM

## 2017-09-18 DIAGNOSIS — F51.01 PRIMARY INSOMNIA: ICD-10-CM

## 2017-09-18 DIAGNOSIS — R74.8 ELEVATED ALKALINE PHOSPHATASE LEVEL: ICD-10-CM

## 2017-09-18 DIAGNOSIS — Z85.819 HISTORY OF THROAT CANCER: ICD-10-CM

## 2017-09-18 DIAGNOSIS — K59.00 CONSTIPATION, UNSPECIFIED CONSTIPATION TYPE: ICD-10-CM

## 2017-09-18 DIAGNOSIS — R63.4 WEIGHT LOSS: ICD-10-CM

## 2017-09-18 DIAGNOSIS — M81.0 AGE-RELATED OSTEOPOROSIS WITHOUT CURRENT PATHOLOGICAL FRACTURE: ICD-10-CM

## 2017-09-18 DIAGNOSIS — H25.12 NUCLEAR SCLEROSIS OF LEFT EYE: ICD-10-CM

## 2017-09-18 DIAGNOSIS — N18.30 CKD (CHRONIC KIDNEY DISEASE) STAGE 3, GFR 30-59 ML/MIN: ICD-10-CM

## 2017-09-18 DIAGNOSIS — N40.0 BENIGN PROSTATIC HYPERPLASIA, PRESENCE OF LOWER URINARY TRACT SYMPTOMS UNSPECIFIED: ICD-10-CM

## 2017-09-18 PROBLEM — Z98.890 POST-OPERATIVE STATE: Status: RESOLVED | Noted: 2017-07-19 | Resolved: 2017-09-18

## 2017-09-18 PROBLEM — H26.9 CORTICAL CATARACT OF BOTH EYES: Status: RESOLVED | Noted: 2017-06-19 | Resolved: 2017-09-18

## 2017-09-18 PROBLEM — H25.10 SENILE NUCLEAR SCLEROSIS: Status: RESOLVED | Noted: 2017-07-18 | Resolved: 2017-09-18

## 2017-09-18 PROBLEM — H52.7 REFRACTIVE ERROR: Status: RESOLVED | Noted: 2017-06-19 | Resolved: 2017-09-18

## 2017-09-18 PROCEDURE — 99999 PR PBB SHADOW E&M-EST. PATIENT-LVL V: CPT | Mod: PBBFAC,,, | Performed by: NURSE PRACTITIONER

## 2017-09-18 PROCEDURE — 99499 UNLISTED E&M SERVICE: CPT | Mod: S$GLB,,, | Performed by: NURSE PRACTITIONER

## 2017-09-18 PROCEDURE — G0439 PPPS, SUBSEQ VISIT: HCPCS | Mod: S$GLB,,, | Performed by: NURSE PRACTITIONER

## 2017-09-18 NOTE — PATIENT INSTRUCTIONS
1. Dental - case management   2. Check on immunizations   3. Right shoulder & knee pain- speak with PCP on referrals        Counseling and Referral of Other Preventative  (Italic type indicates deductible and co-insurance are waived)    Patient Name: Neville Cruz  Today's Date: 9/18/2017      SERVICE LIMITATIONS RECOMMENDATION    Vaccines    · Pneumococcal (once after 65)    · Influenza (annually)    · Hepatitis B (if medium/high risk)    · Prevnar 13      Hepatitis B medium/high risk factors:       - End-stage renal disease       - Hemophiliacs who received Factor VII or         IX concentrates       - Clients of institutions for the mentally             retarded       - Persons who live in the same house as          a HepB carrier       - Homosexual men       - Illicit injectable drug abusers     Pneumococcal: check with pharmacy     Influenza: check with pharmacy     Hepatitis B: N/a     Prevnar 13: check with pharmacy      Prostate cancer screening (annually to age 75)     Prostate specific antigen (PSA) Shared decision making with Provider. Sometimes a co-pay may be required if the patient decides to have this test. The USPSTF no longer recommends prostate cancer screening routinely in medicine:   N/a- discuss with PCP    Colorectal cancer screening (to age 75)    · Fecal occult blood test (annual)  · Flexible sigmoidoscopy (5y)  · Screening colonoscopy (10y)  · Barium enema   states last 2 years ago-     Diabetes self-management training (no USPSTF recommendations)  Requires referral by treating physician for patient with diabetes or renal disease. 10 hours of initial DSMT sessions of no less than 30 minutes each in a continuous 12-month period. 2 hours of follow-up DSMT in subsequent years.  n/a    Glaucoma screening (no USPSTF recommendation)  Diabetes mellitus, family history   , age 50 or over    American, age 65 or over  current    Medical nutrition therapy for diabetes or  renal disease (no recommended schedule)  Requires referral by treating physician for patient with diabetes or renal disease or kidney transplant within the past 3 years.  Can be provided in same year as diabetes self-management training (DSMT), and CMS recommends medical nutrition therapy take place after DSMT. Up to 3 hours for initial year and 2 hours in subsequent years.  n/a    Cardiovascular screening blood tests (every 5 years)  · Fasting lipid panel  Order as a panel if possible  current    Diabetes screening tests (at least every 3 years, Medicare covers annually or at 6-month intervals for prediabetic patients)  · Fasting blood sugar (FBS) or glucose tolerance test (GTT)  Patient must be diagnosed with one of the following:       - Hypertension       - Dyslipidemia       - Obesity (BMI 30kg/m2)       - Previous elevated impaired FBS or GTT       ... or any two of the following:       - Overweight (BMI 25 but <30)       - Family history of diabetes       - Age 65 or older       - History of gestational diabetes or birth of baby weighing more than 9 pounds  current    Abdominal aortic aneurysm screening (once)  · Sonogram   Limited to patients who meet one of the following criteria:       - Men who are 65-75 years old and have smoked more than 100 cigarette in their lifetime       - Anyone with a family history of abdominal aortic aneurysm       - Anyone recommended for screening by the USPSTF  n/a    HIV screening (annually for increased risk patients)  · HIV-1 and HIV-2 by EIA, or ANNABELLA, rapid antibody test or oral mucosa transudate  Patients must be at increased risk for HIV infection per USPSTF guidelines or pregnant. Tests covered annually for patient at increased risk or as requested by the patient. Pregnant patients may receive up to 3 tests during pregnancy.  Risks discussed, screening is declined    Smoking cessation counseling (up to 8 sessions per year)  Patients must be asymptomatic of  tobacco-related conditions to receive as a preventative service.  n/a    Subsequent annual wellness visit  At least 12 months since last AWV  Return in one year     The following information is provided to all patients.  This information is to help you find resources for any of the problems found today that may be affecting your health:                Living healthy guide: www.[facility].[place].[facility]      Understanding Diabetes: www.diabetes.org      Eating healthy: www.cdc.gov/healthyweight      CDC home safety checklist: www.cdc.gov/steadi/patient.html      Agency on Aging: www.goea.[place].[facility]      Alcoholics anonymous (AA): www.aa.org      Physical Activity: www.rosi.nih.gov/pa8uxpd      Tobacco use: www.quitwithusla.org

## 2017-09-18 NOTE — PROGRESS NOTES
Thank you for the referral.  Patient has been assigned to CARI Lenz for low risk screening for Outpatient Case Management.     Reason for referral: Low risk    Weight loss  Please assisa pt with dental resources - no dentures- nonintentional wt loss    Thank you,    Tish Carter, SSC

## 2017-09-18 NOTE — PROGRESS NOTES
"Neville Cruz presented for a  Medicare AWV and comprehensive Health Risk Assessment today. The following components were reviewed and updated:    · Medical history  · Family History  · Social history  · Allergies and Current Medications  · Health Risk Assessment  · Health Maintenance  · Care Team   He is followed by external ENT for H/O throat cancer & cardiologist- could not recall name    ** See Completed Assessments for Annual Wellness Visit within the encounter summary.**       The following assessments were completed:  · Living Situation  · CAGE  · Depression Screening  · Timed Get Up and Go  · Whisper Test  · Cognitive Function Screening  · Nutrition Screening  · ADL Screening  · PAQ Screening    Vitals:    09/18/17 0747   BP: 120/62   Pulse: 66   Temp: 98.6 °F (37 °C)   TempSrc: Oral   Weight: 69.9 kg (154 lb)   Height: 5' 0.5" (1.537 m)     Body mass index is 29.58 kg/m².  Physical Exam   Constitutional: He is oriented to person, place, and time. He appears well-developed and well-nourished.   HENT:   Head: Normocephalic and atraumatic.   Wax in canal  No upper teeth- no plate., missing lower teeth   Cardiovascular: Normal rate, regular rhythm and normal heart sounds.    No murmur heard.  Pulmonary/Chest: Effort normal and breath sounds normal.   Abdominal: Soft. Bowel sounds are normal. There is no tenderness.   Musculoskeletal: He exhibits no edema.   Neurological: He is alert and oriented to person, place, and time.   Skin: Skin is warm and dry.   Psychiatric: He has a normal mood and affect. His behavior is normal. Judgment and thought content normal.   Nursing note and vitals reviewed.        Diagnoses and health risks identified today and associated recommendations/orders:    1. Encounter for preventive health examination  Assessments completed  Preventative health recommendations reviewed       2. Weight loss- 2lbs  Stable- followed by PCP  - Ambulatory referral to Outpatient Case Management    3. " Vitamin D deficiency  Stable- followed by PCP    4. Primary osteoarthritis of right knee  Stable- followed by PCP    5. Nuclear sclerosis of left eye  Stable- followed by opth    6. Primary insomnia  Stable- followed by PCP    7. Microcytic anemia  Stable- followed by PCP    8. History of throat cancer  Stable- followed by external ENT    9. Elevated alkaline phosphatase level  Stable- followed by PCP- endo referral noted    10. Coronary artery disease involving native coronary artery of native heart without angina pectoris  Stable- followed by external cardiology    11. Constipation, unspecified constipation type  Stable- followed by PCP    12. Benign prostatic hyperplasia, presence of lower urinary tract symptoms unspecified  Stable- followed by PCP    13. CKD (chronic kidney disease) stage 3, GFR 30-59 ml/min  Stable- followed by PCP    14. Benign essential HTN  Stable- followed by external cardiology    15. Age-related osteoporosis without current pathological fracture  Stable- followed by PCP      Provided Neville with a 5-10 year written screening schedule and personal prevention plan. Recommendations were developed using the USPSTF age appropriate recommendations. Education, counseling, and referrals were provided as needed. After Visit Summary printed and given to patient which includes a list of additional screenings\tests needed. Daughter present- no dentures- referral to CM for monetary assistance information to help with costs    Return in about 1 year (around 9/18/2018) for HRA.    Kerry Vasquez NP

## 2017-09-19 ENCOUNTER — OUTPATIENT CASE MANAGEMENT (OUTPATIENT)
Dept: ADMINISTRATIVE | Facility: OTHER | Age: 82
End: 2017-09-19

## 2017-09-19 NOTE — PROGRESS NOTES
This CSW received a referral on the above patient.   Reason for referral:Dentures affordability resources  Name of the community resource that was provided:[employer], RTA Application, [employer]   Resource given to: Patient  via [place] Mail and Telephone      This CSW completed assessment with patient. Patient reports he lives in a senior apartment. Patient reports she has an emergency pull alert cord in apartment. Patient reports he has outstanding [facility] Bills. CSW provided Financial Assistance application. Patient reports daughter provided transportation to and from appointments. CSW provided additional transportation resources. Patient is aware of silver sneaker benefit. Patient is seeking affordable dental resources. CSW encourage patient to contact [employer] to review dental benefit. CSW also provided [employer] information for affordable dental care.  CSW mailed all resources and provided her contact information for any additional needs.

## 2017-10-03 ENCOUNTER — OFFICE VISIT (OUTPATIENT)
Dept: ENDOCRINOLOGY | Facility: CLINIC | Age: 82
End: 2017-10-03
Payer: MEDICARE

## 2017-10-03 VITALS
BODY MASS INDEX: 25.93 KG/M2 | DIASTOLIC BLOOD PRESSURE: 74 MMHG | HEIGHT: 65 IN | HEART RATE: 70 BPM | WEIGHT: 155.63 LBS | SYSTOLIC BLOOD PRESSURE: 156 MMHG

## 2017-10-03 DIAGNOSIS — R74.8 ELEVATED ALKALINE PHOSPHATASE LEVEL: ICD-10-CM

## 2017-10-03 DIAGNOSIS — N18.30 CKD (CHRONIC KIDNEY DISEASE) STAGE 3, GFR 30-59 ML/MIN: ICD-10-CM

## 2017-10-03 DIAGNOSIS — I10 BENIGN ESSENTIAL HTN: ICD-10-CM

## 2017-10-03 DIAGNOSIS — R79.9 ABNORMAL FINDING OF BLOOD CHEMISTRY: ICD-10-CM

## 2017-10-03 DIAGNOSIS — E55.9 VITAMIN D DEFICIENCY: ICD-10-CM

## 2017-10-03 DIAGNOSIS — M81.0 AGE-RELATED OSTEOPOROSIS WITHOUT CURRENT PATHOLOGICAL FRACTURE: Primary | ICD-10-CM

## 2017-10-03 PROCEDURE — 99499 UNLISTED E&M SERVICE: CPT | Mod: S$GLB,,, | Performed by: NURSE PRACTITIONER

## 2017-10-03 PROCEDURE — 99204 OFFICE O/P NEW MOD 45 MIN: CPT | Mod: S$GLB,,, | Performed by: INTERNAL MEDICINE

## 2017-10-03 PROCEDURE — 99999 PR PBB SHADOW E&M-EST. PATIENT-LVL III: CPT | Mod: PBBFAC,,, | Performed by: INTERNAL MEDICINE

## 2017-10-03 PROCEDURE — 99499 UNLISTED E&M SERVICE: CPT | Mod: S$GLB,,, | Performed by: INTERNAL MEDICINE

## 2017-10-03 RX ORDER — PNEUMOCOCCAL 13-VALENT CONJUGATE VACCINE 2.2; 2.2; 2.2; 2.2; 2.2; 4.4; 2.2; 2.2; 2.2; 2.2; 2.2; 2.2; 2.2 UG/.5ML; UG/.5ML; UG/.5ML; UG/.5ML; UG/.5ML; UG/.5ML; UG/.5ML; UG/.5ML; UG/.5ML; UG/.5ML; UG/.5ML; UG/.5ML; UG/.5ML
INJECTION, SUSPENSION INTRAMUSCULAR
COMMUNITY
Start: 2017-09-22 | End: 2017-10-03

## 2017-10-03 NOTE — ASSESSMENT & PLAN NOTE
-- Vit D level today  -- Continue Ergo 50,000 units biweekly at this time. Will adjust or reorder medication based on level today.

## 2017-10-03 NOTE — PROGRESS NOTES
Subjective:      Patient ID: Neville Cruz is a 82 y.o. male.    Chief Complaint:  Osteoporosis      History of Present Illness  His daughter is with him today.     With regards to osteoporosis:   Diagnosed on bone density scan from 06/01/2017:  L spine t- score -1.4  Total hip t-score -1.5 & -1.6  Femoral neck: -2.5     current medication: None    Calcium intake?  Never   Vit D intake?  49324 units every other week that was started by his nephrologist in May.   Weight bearing exercise? None  Recent falls? No     Patient uses a cane to assist with ambulation.     Denies recent fractures.   Denies significant height loss (>2 inches).    tob use ?  Past use   etoh use? Past use     Denies current diarrhea or h/o malabsorption    Denies chronic exposure to steroid therapy, anticoagulants, or antiseizure medications.     Does take a proton pump inhibitor- prisolec daily.  C/o history of Indigestion and gerd.     Denies history of thyroid disease.     History of anemia and CKD stage 3. Never on dialysis.  C/o kidney stones was told he had them but does not know for sure. Doesn't recall passing them.   Does have unexplained elevations of alk phos on labs.    Denies active malignancy, or history of malignancy the involved the bone.  Prior radiation treatment in 1997 for throat cancer.     Review of Systems   Constitutional: Negative for fatigue and unexpected weight change.   Eyes: Negative for visual disturbance.   Respiratory: Negative for cough and shortness of breath.    Cardiovascular: Negative for chest pain.   Gastrointestinal: Negative for abdominal pain.   Endocrine: Negative for cold intolerance, heat intolerance, polydipsia, polyphagia and polyuria.   Musculoskeletal:        Right shoulder pain for the past 3 weeks. Notices when he attempts to get out of bed in the morning.    Skin: Negative for wound.   Neurological: Negative for headaches.   Hematological: Does not bruise/bleed easily.    Psychiatric/Behavioral: Negative for sleep disturbance.       Objective:   Physical Exam   Constitutional: He appears well-developed.   HENT:   Right Ear: External ear normal.   Left Ear: External ear normal.   Nose: Nose normal.   Hearing Normal     Neck: No tracheal deviation present. No thyromegaly present.   Cardiovascular: Normal rate.    No murmur heard.  No edema present   Pulmonary/Chest: Effort normal and breath sounds normal.   Abdominal: Soft. He exhibits no mass. No hernia.   Neurological: He is alert. No cranial nerve deficit or sensory deficit.   Skin: No rash noted.   No nodules.   Psychiatric: He has a normal mood and affect. Judgment normal.   Vitals reviewed.      Body mass index is 25.9 kg/m².    Lab Review:   Lab Results   Component Value Date    HGBA1C 5.5 10/03/2017     Lab Results   Component Value Date    CHOL 132 05/10/2017    HDL 37 (L) 05/10/2017    LDLCALC 69.4 05/10/2017    TRIG 128 05/10/2017    CHOLHDL 28.0 05/10/2017     Lab Results   Component Value Date     10/03/2017    K 3.4 (L) 10/03/2017     10/03/2017    CO2 25 10/03/2017    GLU 80 10/03/2017    BUN 15 10/03/2017    CREATININE 1.8 (H) 10/03/2017    CALCIUM 8.8 10/03/2017    PROT 6.2 10/03/2017    ALBUMIN 3.2 (L) 10/03/2017    BILITOT 0.7 10/03/2017    ALKPHOS 148 (H) 10/03/2017    AST 17 10/03/2017    ALT 6 (L) 10/03/2017    ANIONGAP 10 10/03/2017    ESTGFRAFRICA 39.9 (A) 10/03/2017    EGFRNONAA 34.5 (A) 10/03/2017    TSH 1.543 10/03/2017       Assessment and Plan     Age-related osteoporosis without current pathological fracture  Risks include Vitamin D deficiency, CKD stage 3, and PPI therapy.    Reviewed basics of quantity versus quality  Reassuring he is not fracturing   Plan work up of accelerated bone loss to include 24 urine calcium, TSH, PTH, vit D, Bone specific alk phos & CMP.     RDA of calcium (7227-5223 mg /day in divided doses) and to continue Vitamin D 50,000 units every other week at this time.  Will adjust based on levels.   Calcium from food sources preferred  Fall precautions emphasized- continued use of cane with ambulation, night lights if getting up at night, hand rails in the bathroom, and ensuring rugs will not move from under him.  +weight bearing exercise  NOF.org website information given    Treatment options and potential side effects discussed for Prolia.    Avoid bisphosphinates due to CKD (GFR 31).    Low risk for ONJ and atypical fractures reviewed and discussed.    CKD (chronic kidney disease) stage 3, GFR 30-59 ml/min  -- Daughter is planning on making an appointment with a nephrologist at [facility] today.   -- A1C done today to screen for diabetes.   Based on gfr avoid bisphosphonates     Vitamin D deficiency  -- Vit D level today  -- Continue Ergo 50,000 units biweekly at this time. Will adjust or reorder medication based on level today.     Elevated alkaline phosphatase level  -- Checking bone specific alk phos today.       Benign essential HTN  BP elevated upon arrival to the clinic.  Rechecked before leaving and it ws 148/82.  Patient will monitor at home and daughter is making a PCP appointment today to follow up on elevated BP.     Case discussed with Dr. Song   Recommendations were discussed with the patient in detail  The patient verbalized understanding and agrees with the plan outlined as above.       I, Anahi Song MD,  have personally taken the history and examined the patient and agree with Ashlee's note as stated above.

## 2017-10-03 NOTE — PATIENT INSTRUCTIONS
Calcium 2567-3293 mg daily.  Prefer to get from food.   NOF.org- great information website for osteoporosis

## 2017-10-03 NOTE — ASSESSMENT & PLAN NOTE
BP elevated upon arrival to the clinic.  Rechecked before leaving and it ws 148/82.  Patient will monitor at home and daughter is making a PCP appointment today to follow up on elevated BP.

## 2017-10-03 NOTE — LETTER
October 3, 2017      Liberty Berger MD  [address]  [facility] [address]           [facility]  [address]  [facility] [address]  Phone: [phone]  Fax: [phone]          Patient: Neville Cruz   MR Number: [id number]   YOB: 1935   Date of Visit: 10/3/2017       Dear Dr. Liberty Berger:    Thank you for referring Neville Cruz to me for evaluation. Attached you will find relevant portions of my assessment and plan of care.    If you have questions, please do not hesitate to call me. I look forward to following Neville Cruz along with you.    Sincerely,    Anahi Song MD    Enclosure  CC:  No Recipients    If you would like to receive this communication electronically, please contact [email or web address] or [phone] to request more information on [facility] Link access.    For providers and/or their staff who would like to refer a patient to [facility], please contact us through our one-stop-shop provider referral line, [facility], at [phone].    If you feel you have received this communication in error or would no longer like to receive these types of communications, please e-mail [email or web address]

## 2017-10-03 NOTE — ASSESSMENT & PLAN NOTE
Risks include Vitamin D deficiency, CKD stage 3, and PPI therapy.    Reviewed basics of quantity versus quality  Reassuring he is not fracturing   Plan work up of accelerated bone loss to include 24 urine calcium, TSH, PTH, vit D, Bone specific alk phos & CMP.     RDA of calcium (0359-0088 mg /day in divided doses) and to continue Vitamin D 50,000 units every other week at this time. Will adjust based on levels.   Calcium from food sources preferred  Fall precautions emphasized- continued use of cane with ambulation, night lights if getting up at night, hand rails in the bathroom, and ensuring rugs will not move from under him.  +weight bearing exercise  NOF.org website information given    Treatment options and potential side effects discussed for Prolia.    Avoid bisphosphinates due to CKD (GFR 31).    Low risk for ONJ and atypical fractures reviewed and discussed.

## 2017-10-03 NOTE — ASSESSMENT & PLAN NOTE
-- Daughter is planning on making an appointment with a nephrologist at [facility] today.   -- A1C done today to screen for diabetes.   Based on gfr avoid bisphosphonates

## 2017-10-05 ENCOUNTER — OFFICE VISIT (OUTPATIENT)
Dept: FAMILY MEDICINE | Facility: CLINIC | Age: 82
End: 2017-10-05
Payer: MEDICARE

## 2017-10-05 ENCOUNTER — HOSPITAL ENCOUNTER (OUTPATIENT)
Dept: RADIOLOGY | Facility: HOSPITAL | Age: 82
Discharge: HOME | End: 2017-10-05
Attending: FAMILY MEDICINE
Payer: MEDICARE

## 2017-10-05 VITALS
HEART RATE: 67 BPM | HEIGHT: 66 IN | BODY MASS INDEX: 25.12 KG/M2 | DIASTOLIC BLOOD PRESSURE: 69 MMHG | TEMPERATURE: 98 F | SYSTOLIC BLOOD PRESSURE: 128 MMHG | WEIGHT: 156.31 LBS

## 2017-10-05 DIAGNOSIS — M79.601 PAIN OF RIGHT UPPER EXTREMITY: ICD-10-CM

## 2017-10-05 DIAGNOSIS — M25.519 SHOULDER PAIN, UNSPECIFIED CHRONICITY, UNSPECIFIED LATERALITY: Primary | ICD-10-CM

## 2017-10-05 DIAGNOSIS — M25.519 SHOULDER PAIN, UNSPECIFIED CHRONICITY, UNSPECIFIED LATERALITY: ICD-10-CM

## 2017-10-05 PROCEDURE — 72040 X-RAY EXAM NECK SPINE 2-3 VW: CPT | Mod: 26,,, | Performed by: RADIOLOGY

## 2017-10-05 PROCEDURE — 73030 X-RAY EXAM OF SHOULDER: CPT | Mod: TC,PO,RT

## 2017-10-05 PROCEDURE — 99999 PR PBB SHADOW E&M-EST. PATIENT-LVL III: CPT | Mod: PBBFAC,,, | Performed by: FAMILY MEDICINE

## 2017-10-05 PROCEDURE — 73030 X-RAY EXAM OF SHOULDER: CPT | Mod: 26,RT,, | Performed by: RADIOLOGY

## 2017-10-05 PROCEDURE — 99214 OFFICE O/P EST MOD 30 MIN: CPT | Mod: S$GLB,,, | Performed by: FAMILY MEDICINE

## 2017-10-05 PROCEDURE — 72040 X-RAY EXAM NECK SPINE 2-3 VW: CPT | Mod: TC,PO

## 2017-10-05 RX ORDER — ACETAMINOPHEN AND CODEINE PHOSPHATE 300; 30 MG/1; MG/1
1 TABLET ORAL 3 TIMES DAILY PRN
Qty: 60 TABLET | Refills: 0 | Status: SHIPPED | OUTPATIENT
Start: 2017-10-05 | End: 2017-10-15

## 2017-10-05 NOTE — PROGRESS NOTES
Subjective:       Patient ID: Neville Cruz is a 82 y.o. male.    Chief Complaint: Patient complaining of right shoulder pain that came on gradually over the last week  Patient denied any specific incident in which she injured her shoulder.  Patient has a history of heavy lifting  Retired from working with [employer]  HPI see above  Review of Systems   Constitutional: Positive for fatigue.   HENT: Negative.    Eyes: Negative.    Respiratory: Negative.    Cardiovascular: Negative.    Gastrointestinal: Negative.    Endocrine: Negative.    Genitourinary: Negative.    Musculoskeletal: Positive for arthralgias, myalgias, neck pain and neck stiffness.   Skin: Negative.    Allergic/Immunologic: Negative.    Neurological: Positive for weakness and numbness.   Hematological: Negative.    Psychiatric/Behavioral: Negative.  Negative for confusion.       Objective:      Physical Exam   Constitutional: He appears well-developed and well-nourished. No distress.   HENT:   Head: Normocephalic and atraumatic.   Eyes: Conjunctivae and EOM are normal. Pupils are equal, round, and reactive to light.   Neck: No JVD present. Muscular tenderness present. Decreased range of motion present.   Pulmonary/Chest: Effort normal.   Musculoskeletal:        Right shoulder: He exhibits decreased range of motion, tenderness, swelling, pain and spasm.        Left shoulder: Normal.        Cervical back: He exhibits decreased range of motion, tenderness, pain and spasm.   Skin: He is not diaphoretic.   Nursing note and vitals reviewed.      Assessment:       1. Shoulder pain, unspecified chronicity, unspecified laterality    2. Pain of right upper extremity        Plan:     osteoarthritic changes in both x-rays results discussed with the patient and his daughter   X-Ray Cervical Spine AP And Lateral         X-ray Shoulder 2 or More Views Right          Further med card dated 10/5/2017  acetaminophen-codeine 300-30mg (TYLENOL #3) 300-30 mg Tab 1 tablet, 3  times daily PRN

## 2017-10-12 ENCOUNTER — TELEPHONE (OUTPATIENT)
Dept: ENDOCRINOLOGY | Facility: CLINIC | Age: 82
End: 2017-10-12

## 2017-10-12 DIAGNOSIS — R74.8 ELEVATED ALKALINE PHOSPHATASE LEVEL: Primary | ICD-10-CM

## 2017-10-12 NOTE — TELEPHONE ENCOUNTER
Ensured patient was taking OTC calcium 0852-5746 mg daily. Preferably from food.     Also, put in orders for Celiac disease testing with next set of labs.

## 2017-10-19 ENCOUNTER — TELEPHONE (OUTPATIENT)
Dept: FAMILY MEDICINE | Facility: CLINIC | Age: 82
End: 2017-10-19

## 2017-10-19 ENCOUNTER — INFUSION (OUTPATIENT)
Dept: INFECTIOUS DISEASES | Facility: HOSPITAL | Age: 82
End: 2017-10-19
Attending: INTERNAL MEDICINE
Payer: MEDICARE

## 2017-10-19 VITALS — BODY MASS INDEX: 25.12 KG/M2 | HEIGHT: 66 IN | WEIGHT: 156.31 LBS

## 2017-10-19 DIAGNOSIS — N18.30 CKD (CHRONIC KIDNEY DISEASE) STAGE 3, GFR 30-59 ML/MIN: ICD-10-CM

## 2017-10-19 DIAGNOSIS — M81.0 AGE-RELATED OSTEOPOROSIS WITHOUT CURRENT PATHOLOGICAL FRACTURE: Primary | ICD-10-CM

## 2017-10-19 PROCEDURE — 63600175 PHARM REV CODE 636 W HCPCS: Performed by: INTERNAL MEDICINE

## 2017-10-19 PROCEDURE — 96372 THER/PROPH/DIAG INJ SC/IM: CPT

## 2017-10-19 RX ADMIN — DENOSUMAB 60 MG: 60 INJECTION SUBCUTANEOUS at 08:10

## 2017-10-19 NOTE — TELEPHONE ENCOUNTER
Spoke with patient's daughter advised her that we received the forms she dropped off for for her dad (certification of ADA para transit eligibility) and they are ready for pickup.  Daughter states she thought she mailed the forms already but would like a copy faxed to her just in case they didn't received them.  Daughter states she will contact our office back with a fax number.

## 2017-10-25 NOTE — TELEPHONE ENCOUNTER
Never heard back form patient's daughter concerning the fax number, attempted to contact daughter voicemail left advising her that I will send form to home address on file

## 2017-11-11 DIAGNOSIS — G47.00 INSOMNIA, UNSPECIFIED TYPE: ICD-10-CM

## 2017-11-13 RX ORDER — ZOLPIDEM TARTRATE 6.25 MG/1
TABLET, FILM COATED, EXTENDED RELEASE ORAL
Qty: 30 TABLET | Refills: 5 | Status: SHIPPED | OUTPATIENT
Start: 2017-11-13 | End: 2018-01-01

## 2017-11-20 ENCOUNTER — TELEPHONE (OUTPATIENT)
Dept: FAMILY MEDICINE | Facility: CLINIC | Age: 82
End: 2017-11-20

## 2017-11-20 NOTE — TELEPHONE ENCOUNTER
----- Message from CorkyNeri Timothy sent at 11/20/2017  1:13 PM CST -----  Contact: Pt at [phone]  RX request - refill or new RX.  Is this a refill or new RX:  refill  RX name and strength: zolpidem (AMBIEN CR) 6.25 MG CR tablet  Directions: TAKE 1 TABLET(6.25 MG) BY MOUTH EVERY NIGHT AS NEEDED FOR INSOMNIA     Pharmacy name and phone # : [facility] [address] [place] [address] AT [facility]   [phone] (Phone)  [phone] (Fax)      Comments:  Pharmacy is waiting on Dr. Berger or nurse to give them a phone call before they give the patient the medication.

## 2017-12-05 ENCOUNTER — HOSPITAL ENCOUNTER (INPATIENT)
Facility: HOSPITAL | Age: 82
LOS: 1 days | Discharge: HOME | DRG: 392 | End: 2017-12-07
Attending: EMERGENCY MEDICINE | Admitting: EMERGENCY MEDICINE
Payer: MEDICARE

## 2017-12-05 DIAGNOSIS — R11.2 NAUSEA & VOMITING: ICD-10-CM

## 2017-12-05 DIAGNOSIS — K52.9 COLITIS: ICD-10-CM

## 2017-12-05 DIAGNOSIS — E83.39 LOW PHOSPHATE LEVELS: Primary | ICD-10-CM

## 2017-12-05 DIAGNOSIS — R11.10 EMESIS: ICD-10-CM

## 2017-12-05 LAB
ALBUMIN SERPL BCP-MCNC: 3.6 G/DL
ALP SERPL-CCNC: 92 U/L
ALT SERPL W/O P-5'-P-CCNC: 8 U/L
ANION GAP SERPL CALC-SCNC: 11 MMOL/L
AST SERPL-CCNC: 17 U/L
BASOPHILS # BLD AUTO: 0.05 K/UL
BASOPHILS NFR BLD: 0.3 %
BILIRUB SERPL-MCNC: 1.2 MG/DL
BUN SERPL-MCNC: 23 MG/DL
CALCIUM SERPL-MCNC: 8.4 MG/DL
CHLORIDE SERPL-SCNC: 109 MMOL/L
CO2 SERPL-SCNC: 19 MMOL/L
CREAT SERPL-MCNC: 2.1 MG/DL
DIFFERENTIAL METHOD: ABNORMAL
EOSINOPHIL # BLD AUTO: 0 K/UL
EOSINOPHIL NFR BLD: 0.2 %
ERYTHROCYTE [DISTWIDTH] IN BLOOD BY AUTOMATED COUNT: 16.1 %
EST. GFR  (AFRICAN AMERICAN): 33.1 ML/MIN/1.73 M^2
EST. GFR  (NON AFRICAN AMERICAN): 28.7 ML/MIN/1.73 M^2
GLUCOSE SERPL-MCNC: 149 MG/DL
HCT VFR BLD AUTO: 43.9 %
HGB BLD-MCNC: 13.4 G/DL
IMM GRANULOCYTES # BLD AUTO: 0.11 K/UL
IMM GRANULOCYTES NFR BLD AUTO: 0.6 %
LYMPHOCYTES # BLD AUTO: 0.2 K/UL
LYMPHOCYTES NFR BLD: 1.3 %
MAGNESIUM SERPL-MCNC: 1.6 MG/DL
MCH RBC QN AUTO: 23.4 PG
MCHC RBC AUTO-ENTMCNC: 30.5 G/DL
MCV RBC AUTO: 77 FL
MONOCYTES # BLD AUTO: 0.7 K/UL
MONOCYTES NFR BLD: 4 %
NEUTROPHILS # BLD AUTO: 16.1 K/UL
NEUTROPHILS NFR BLD: 93.6 %
NRBC BLD-RTO: 0 /100 WBC
PLATELET # BLD AUTO: 451 K/UL
PMV BLD AUTO: 11.3 FL
POTASSIUM SERPL-SCNC: 4.8 MMOL/L
PROT SERPL-MCNC: 6.8 G/DL
RBC # BLD AUTO: 5.72 M/UL
SODIUM SERPL-SCNC: 139 MMOL/L
WBC # BLD AUTO: 17.23 K/UL

## 2017-12-05 PROCEDURE — 99284 EMERGENCY DEPT VISIT MOD MDM: CPT | Mod: ,,, | Performed by: EMERGENCY MEDICINE

## 2017-12-05 PROCEDURE — 96365 THER/PROPH/DIAG IV INF INIT: CPT

## 2017-12-05 PROCEDURE — 93005 ELECTROCARDIOGRAM TRACING: CPT

## 2017-12-05 PROCEDURE — 63600175 PHARM REV CODE 636 W HCPCS: Performed by: EMERGENCY MEDICINE

## 2017-12-05 PROCEDURE — 99285 EMERGENCY DEPT VISIT HI MDM: CPT | Mod: 25

## 2017-12-05 PROCEDURE — 96366 THER/PROPH/DIAG IV INF ADDON: CPT

## 2017-12-05 PROCEDURE — 80053 COMPREHEN METABOLIC PANEL: CPT

## 2017-12-05 PROCEDURE — 11000001 HC ACUTE MED/SURG PRIVATE ROOM

## 2017-12-05 PROCEDURE — 96361 HYDRATE IV INFUSION ADD-ON: CPT

## 2017-12-05 PROCEDURE — 96375 TX/PRO/DX INJ NEW DRUG ADDON: CPT

## 2017-12-05 PROCEDURE — 25000003 PHARM REV CODE 250: Performed by: EMERGENCY MEDICINE

## 2017-12-05 PROCEDURE — 83735 ASSAY OF MAGNESIUM: CPT

## 2017-12-05 PROCEDURE — 93010 ELECTROCARDIOGRAM REPORT: CPT | Mod: ,,, | Performed by: INTERNAL MEDICINE

## 2017-12-05 PROCEDURE — 84100 ASSAY OF PHOSPHORUS: CPT

## 2017-12-05 PROCEDURE — 85025 COMPLETE CBC W/AUTO DIFF WBC: CPT

## 2017-12-05 RX ORDER — ONDANSETRON 2 MG/ML
4 INJECTION INTRAMUSCULAR; INTRAVENOUS ONCE
Status: COMPLETED | OUTPATIENT
Start: 2017-12-05 | End: 2017-12-05

## 2017-12-05 RX ORDER — DOCUSATE SODIUM 100 MG/1
100 CAPSULE, LIQUID FILLED ORAL 2 TIMES DAILY
COMMUNITY
End: 2017-12-21

## 2017-12-05 RX ADMIN — ONDANSETRON 4 MG: 2 INJECTION INTRAMUSCULAR; INTRAVENOUS at 10:12

## 2017-12-05 RX ADMIN — SODIUM CHLORIDE 1000 ML: 0.9 INJECTION, SOLUTION INTRAVENOUS at 10:12

## 2017-12-06 PROBLEM — R11.10 EMESIS: Status: ACTIVE | Noted: 2017-12-06

## 2017-12-06 PROBLEM — E83.39 LOW PHOSPHATE LEVELS: Status: ACTIVE | Noted: 2017-12-06

## 2017-12-06 PROBLEM — N18.30 CKD (CHRONIC KIDNEY DISEASE) STAGE 3, GFR 30-59 ML/MIN: Chronic | Status: ACTIVE | Noted: 2017-05-12

## 2017-12-06 PROBLEM — I10 BENIGN ESSENTIAL HTN: Chronic | Status: ACTIVE | Noted: 2017-05-10

## 2017-12-06 PROBLEM — K52.9 COLITIS: Status: ACTIVE | Noted: 2017-12-06

## 2017-12-06 PROBLEM — I25.10 CORONARY ARTERY DISEASE INVOLVING NATIVE CORONARY ARTERY OF NATIVE HEART WITHOUT ANGINA PECTORIS: Chronic | Status: ACTIVE | Noted: 2017-05-10

## 2017-12-06 PROBLEM — R11.2 NAUSEA & VOMITING: Status: ACTIVE | Noted: 2017-12-06

## 2017-12-06 LAB — PHOSPHATE SERPL-MCNC: <1 MG/DL

## 2017-12-06 PROCEDURE — 36415 COLL VENOUS BLD VENIPUNCTURE: CPT

## 2017-12-06 PROCEDURE — 63600175 PHARM REV CODE 636 W HCPCS: Performed by: PHYSICIAN ASSISTANT

## 2017-12-06 PROCEDURE — 25000003 PHARM REV CODE 250: Performed by: EMERGENCY MEDICINE

## 2017-12-06 PROCEDURE — 87040 BLOOD CULTURE FOR BACTERIA: CPT

## 2017-12-06 PROCEDURE — 99220 PR INITIAL OBSERVATION CARE,LEVL III: CPT | Mod: ,,, | Performed by: PHYSICIAN ASSISTANT

## 2017-12-06 PROCEDURE — 25000003 PHARM REV CODE 250: Performed by: PHYSICIAN ASSISTANT

## 2017-12-06 PROCEDURE — 11000001 HC ACUTE MED/SURG PRIVATE ROOM

## 2017-12-06 RX ORDER — ZOLPIDEM TARTRATE 5 MG/1
5 TABLET ORAL NIGHTLY PRN
Status: DISCONTINUED | OUTPATIENT
Start: 2017-12-06 | End: 2017-12-07 | Stop reason: HOSPADM

## 2017-12-06 RX ORDER — CIPROFLOXACIN 2 MG/ML
400 INJECTION, SOLUTION INTRAVENOUS
Status: DISCONTINUED | OUTPATIENT
Start: 2017-12-06 | End: 2017-12-07 | Stop reason: HOSPADM

## 2017-12-06 RX ORDER — METOPROLOL SUCCINATE 50 MG/1
50 TABLET, EXTENDED RELEASE ORAL DAILY
Status: DISCONTINUED | OUTPATIENT
Start: 2017-12-06 | End: 2017-12-07 | Stop reason: HOSPADM

## 2017-12-06 RX ORDER — ONDANSETRON 8 MG/1
8 TABLET, ORALLY DISINTEGRATING ORAL EVERY 8 HOURS PRN
Status: DISCONTINUED | OUTPATIENT
Start: 2017-12-06 | End: 2017-12-06

## 2017-12-06 RX ORDER — IBUPROFEN 200 MG
16 TABLET ORAL
Status: DISCONTINUED | OUTPATIENT
Start: 2017-12-06 | End: 2017-12-07 | Stop reason: HOSPADM

## 2017-12-06 RX ORDER — CIPROFLOXACIN 500 MG/1
500 TABLET ORAL
Status: COMPLETED | OUTPATIENT
Start: 2017-12-06 | End: 2017-12-06

## 2017-12-06 RX ORDER — AMOXICILLIN 250 MG
1 CAPSULE ORAL 2 TIMES DAILY
Status: DISCONTINUED | OUTPATIENT
Start: 2017-12-06 | End: 2017-12-07 | Stop reason: HOSPADM

## 2017-12-06 RX ORDER — METRONIDAZOLE 500 MG/1
500 TABLET ORAL
Status: DISCONTINUED | OUTPATIENT
Start: 2017-12-06 | End: 2017-12-07 | Stop reason: HOSPADM

## 2017-12-06 RX ORDER — ONDANSETRON 2 MG/ML
4 INJECTION INTRAMUSCULAR; INTRAVENOUS EVERY 8 HOURS PRN
Status: DISCONTINUED | OUTPATIENT
Start: 2017-12-06 | End: 2017-12-07 | Stop reason: HOSPADM

## 2017-12-06 RX ORDER — CLOPIDOGREL BISULFATE 75 MG/1
75 TABLET ORAL DAILY
Status: DISCONTINUED | OUTPATIENT
Start: 2017-12-06 | End: 2017-12-07 | Stop reason: HOSPADM

## 2017-12-06 RX ORDER — IPRATROPIUM BROMIDE AND ALBUTEROL SULFATE 2.5; .5 MG/3ML; MG/3ML
3 SOLUTION RESPIRATORY (INHALATION) EVERY 4 HOURS PRN
Status: DISCONTINUED | OUTPATIENT
Start: 2017-12-06 | End: 2017-12-07 | Stop reason: HOSPADM

## 2017-12-06 RX ORDER — IBUPROFEN 200 MG
24 TABLET ORAL
Status: DISCONTINUED | OUTPATIENT
Start: 2017-12-06 | End: 2017-12-07 | Stop reason: HOSPADM

## 2017-12-06 RX ORDER — BENAZEPRIL HYDROCHLORIDE 20 MG/1
20 TABLET ORAL DAILY
Status: DISCONTINUED | OUTPATIENT
Start: 2017-12-06 | End: 2017-12-07 | Stop reason: HOSPADM

## 2017-12-06 RX ORDER — SODIUM CHLORIDE 0.9 % (FLUSH) 0.9 %
5 SYRINGE (ML) INJECTION
Status: DISCONTINUED | OUTPATIENT
Start: 2017-12-06 | End: 2017-12-07 | Stop reason: HOSPADM

## 2017-12-06 RX ORDER — AMLODIPINE BESYLATE 10 MG/1
10 TABLET ORAL DAILY
Status: DISCONTINUED | OUTPATIENT
Start: 2017-12-06 | End: 2017-12-07 | Stop reason: HOSPADM

## 2017-12-06 RX ORDER — TAMSULOSIN HYDROCHLORIDE 0.4 MG/1
0.4 CAPSULE ORAL DAILY
Status: DISCONTINUED | OUTPATIENT
Start: 2017-12-06 | End: 2017-12-07 | Stop reason: HOSPADM

## 2017-12-06 RX ORDER — NAPROXEN SODIUM 220 MG/1
81 TABLET, FILM COATED ORAL DAILY
Status: DISCONTINUED | OUTPATIENT
Start: 2017-12-06 | End: 2017-12-07 | Stop reason: HOSPADM

## 2017-12-06 RX ORDER — PANTOPRAZOLE SODIUM 40 MG/1
40 TABLET, DELAYED RELEASE ORAL DAILY
Status: DISCONTINUED | OUTPATIENT
Start: 2017-12-06 | End: 2017-12-07 | Stop reason: HOSPADM

## 2017-12-06 RX ORDER — RAMELTEON 8 MG/1
8 TABLET ORAL NIGHTLY PRN
Status: DISCONTINUED | OUTPATIENT
Start: 2017-12-06 | End: 2017-12-06

## 2017-12-06 RX ORDER — ACETAMINOPHEN 325 MG/1
650 TABLET ORAL EVERY 8 HOURS PRN
Status: DISCONTINUED | OUTPATIENT
Start: 2017-12-06 | End: 2017-12-07 | Stop reason: HOSPADM

## 2017-12-06 RX ORDER — PROMETHAZINE HYDROCHLORIDE 25 MG/1
25 TABLET ORAL EVERY 6 HOURS PRN
Status: DISCONTINUED | OUTPATIENT
Start: 2017-12-06 | End: 2017-12-07 | Stop reason: HOSPADM

## 2017-12-06 RX ORDER — GLUCAGON 1 MG
1 KIT INJECTION
Status: DISCONTINUED | OUTPATIENT
Start: 2017-12-06 | End: 2017-12-07 | Stop reason: HOSPADM

## 2017-12-06 RX ORDER — METRONIDAZOLE 500 MG/1
500 TABLET ORAL
Status: COMPLETED | OUTPATIENT
Start: 2017-12-06 | End: 2017-12-06

## 2017-12-06 RX ORDER — SODIUM CHLORIDE 9 MG/ML
1000 INJECTION, SOLUTION INTRAVENOUS ONCE
Status: COMPLETED | OUTPATIENT
Start: 2017-12-06 | End: 2017-12-06

## 2017-12-06 RX ORDER — PRAVASTATIN SODIUM 40 MG/1
40 TABLET ORAL DAILY
Status: DISCONTINUED | OUTPATIENT
Start: 2017-12-06 | End: 2017-12-07 | Stop reason: HOSPADM

## 2017-12-06 RX ADMIN — CIPROFLOXACIN 400 MG: 2 INJECTION, SOLUTION INTRAVENOUS at 05:12

## 2017-12-06 RX ADMIN — METOPROLOL SUCCINATE 50 MG: 50 TABLET, EXTENDED RELEASE ORAL at 08:12

## 2017-12-06 RX ADMIN — STANDARDIZED SENNA CONCENTRATE AND DOCUSATE SODIUM 1 TABLET: 8.6; 5 TABLET, FILM COATED ORAL at 09:12

## 2017-12-06 RX ADMIN — PANTOPRAZOLE SODIUM 40 MG: 40 TABLET, DELAYED RELEASE ORAL at 08:12

## 2017-12-06 RX ADMIN — POTASSIUM PHOSPHATE, MONOBASIC AND POTASSIUM PHOSPHATE, DIBASIC 40 MMOL: 224; 236 INJECTION, SOLUTION INTRAVENOUS at 01:12

## 2017-12-06 RX ADMIN — METRONIDAZOLE 500 MG: 500 TABLET ORAL at 01:12

## 2017-12-06 RX ADMIN — CLOPIDOGREL 75 MG: 75 TABLET, FILM COATED ORAL at 08:12

## 2017-12-06 RX ADMIN — ONDANSETRON 4 MG: 2 INJECTION, SOLUTION INTRAMUSCULAR; INTRAVENOUS at 05:12

## 2017-12-06 RX ADMIN — PRAVASTATIN SODIUM 40 MG: 40 TABLET ORAL at 09:12

## 2017-12-06 RX ADMIN — SODIUM CHLORIDE 1000 ML: 0.9 INJECTION, SOLUTION INTRAVENOUS at 10:12

## 2017-12-06 RX ADMIN — ASPIRIN 81 MG CHEWABLE TABLET 81 MG: 81 TABLET CHEWABLE at 08:12

## 2017-12-06 RX ADMIN — BENAZEPRIL HYDROCHLORIDE 20 MG: 20 TABLET, FILM COATED ORAL at 08:12

## 2017-12-06 RX ADMIN — ZOLPIDEM TARTRATE 5 MG: 5 TABLET, FILM COATED ORAL at 09:12

## 2017-12-06 RX ADMIN — CIPROFLOXACIN HYDROCHLORIDE 500 MG: 500 TABLET, FILM COATED ORAL at 01:12

## 2017-12-06 RX ADMIN — TAMSULOSIN HYDROCHLORIDE 0.4 MG: 0.4 CAPSULE ORAL at 08:12

## 2017-12-06 RX ADMIN — METRONIDAZOLE 500 MG: 500 TABLET ORAL at 05:12

## 2017-12-06 RX ADMIN — METRONIDAZOLE 500 MG: 500 TABLET ORAL at 08:12

## 2017-12-06 RX ADMIN — AMLODIPINE BESYLATE 10 MG: 10 TABLET ORAL at 08:12

## 2017-12-06 NOTE — ED TRIAGE NOTES
Pt reports n/v/d since about 4pm today. Pt states he has had about 5/6 episodes. Pt reports he has been taking stool softness since Sunday. Pt also reports a sore throat, headache, dizziness, and fatigue.

## 2017-12-06 NOTE — ASSESSMENT & PLAN NOTE
- WBC 17.23.  Patient is afebrile.  - Cultures, stool studies ordered.  - Continue Cipro and Flagyl.  - Clear liquid diet, advance as tolerated.

## 2017-12-06 NOTE — HOSPITAL COURSE
Patient is an 82 year old gentleman with a h/o CAD, CKD III, HTN.  He presents with N/V/D.  Symptoms began today.  He states he has been taking Colace for three days due to constipation  This morning, however, he started having diarrhea.  He has had 5 episodes of vomiting since this morning.  He denies abdominal pain, chest pain, SOB, dizziness, palpitations, fever/chills, sick contacts.     Diahrea resolved. Stool studies were negative. Will dc home on no abx.

## 2017-12-06 NOTE — PLAN OF CARE
12/06/17 1005   Discharge Assessment   Assessment Type Discharge Planning Assessment   Confirmed/corrected address and phone number on facesheet? Yes   Assessment information obtained from? Patient   Expected Length of Stay (days) 2   Communicated expected length of stay with patient/caregiver yes   Prior to hospitilization cognitive status: Alert/Oriented   Prior to hospitalization functional status: Assistive Equipment   Current cognitive status: Alert/Oriented   Current Functional Status: Assistive Equipment   Lives With alone   Able to Return to Prior Arrangements yes   Is patient able to care for self after discharge? Yes   Patient's perception of discharge disposition home or selfcare   Readmission Within The Last 30 Days no previous admission in last 30 days   Patient currently being followed by outpatient case management? No   Equipment Currently Used at Home cane, straight   Do you have any problems affording any of your prescribed medications? No   Is the patient taking medications as prescribed? yes   Discharge Plan A Home   Discharge Plan B Home Health   Patient/Family In Agreement With Plan yes   Patient states he lives alone and daughter lives close by and is available to assist as needed..

## 2017-12-06 NOTE — H&P
[facility] Hospital Medicine  History & Physical    Patient Name: Neville Cruz  MRN: [id number]  Admission Date: 12/5/2017  Attending Physician: Ga Akins MD   Primary Care Provider: Liberty Berger MD    [facility] Medicine Team: Networked reference to record PCT  Farhana Soler PA-C     Patient information was obtained from patient, relative(s), past medical records and ER records.     Subjective:     Principal Problem:Low phosphate levels    Chief Complaint:   Chief Complaint   Patient presents with    Emesis     pt reports diarrhea, n/v ,weakness, sob  x 24 hours. pt denies any chest pain. pt also reports stool softner use for 3 days. small amount of blood reported when wiping.         HPI: Patient is an 82 year old gentleman with a h/o CAD, CKD III, HTN.  He presents with N/V/D.  Symptoms began today.  He states he has been taking Colace for three days due to constipation  This morning, however, he started having diarrhea.  He has had 5 episodes of vomiting since this morning.  He denies abdominal pain, chest pain, SOB, dizziness, palpitations, fever/chills, sick contacts.      Past Medical History:   Diagnosis Date    Age-related osteoporosis without current pathological fracture 6/6/2017    Benign prostatic hyperplasia 05/10/2017    Cancer     throat    Cataract     CKD (chronic kidney disease) stage 3, GFR 30-59 ml/min 5/12/2017    Constipation 5/10/2017    Coronary artery disease involving native coronary artery of native heart without angina pectoris 05/10/2017    cards/Dr. Melva Reilly    H/O heart artery stent     LAD 9/22/16 Zotarolimus eluting coronary stent system- [facility]    History of throat cancer 1997    s/p surgery, XRT    Hypertension     Insomnia     Primary osteoarthritis of right knee 05/10/2017    sees ortho, knee replacement recommended       Past Surgical History:   Procedure Laterality Date    CATARACT EXTRACTION Right     july 2017     CORONARY ANGIOPLASTY WITH STENT PLACEMENT      LCx 1999, mid RCA 2010, LAD 9/2016; cards/Dr. Melva Reilly    THROAT SURGERY      2/2 throat cancer       Review of patient's allergies indicates:  No Known Allergies    No current facility-administered medications on file prior to encounter.      Current Outpatient Prescriptions on File Prior to Encounter   Medication Sig    amlodipine (NORVASC) 10 MG tablet Take 1 tablet (10 mg total) by mouth once daily.    aspirin 81 MG Chew Take 81 mg by mouth once daily.    clopidogrel (PLAVIX) 75 mg tablet Take 1 tablet (75 mg total) by mouth once daily.    ERGOCALCIFEROL, VITAMIN D2, (VITAMIN D ORAL) Take 50,000 Int'l Units by mouth. Every other week    metoprolol succinate (TOPROL-XL) 50 MG 24 hr tablet Take 1 tablet (50 mg total) by mouth once daily.    omeprazole (PRILOSEC) 20 MG capsule TAKE 1 CAPSULE BY MOUTH ONCE DAILY AT 6 AM.    pravastatin (PRAVACHOL) 40 MG tablet Take 1 tablet (40 mg total) by mouth once daily.    tamsulosin (FLOMAX) 0.4 mg Cp24 Take 1 capsule (0.4 mg total) by mouth once daily.    zolpidem (AMBIEN CR) 6.25 MG CR tablet TAKE 1 TABLET(6.25 MG) BY MOUTH EVERY NIGHT AS NEEDED FOR INSOMNIA    benazepril (LOTENSIN) 20 MG tablet Take 1 tablet (20 mg total) by mouth once daily.     Family History     Problem Relation (Age of Onset)    Cancer Mother    Hypertension Mother, Daughter, Son, Daughter        Social History Main Topics    Smoking status: Former Smoker     Years: 50.00     Types: Cigarettes    Smokeless tobacco: Never Used    Alcohol use No    Drug use: No    Sexual activity: No     Review of Systems   Constitutional: Negative for activity change, appetite change, chills, diaphoresis, fatigue, fever and unexpected weight change.   HENT: Negative for congestion, rhinorrhea, sore throat, trouble swallowing and voice change.    Eyes: Negative for visual disturbance.   Respiratory: Negative for cough, choking, chest tightness,  shortness of breath and wheezing.    Cardiovascular: Negative for chest pain, palpitations and leg swelling.   Gastrointestinal: Positive for diarrhea, nausea and vomiting. Negative for abdominal distention, abdominal pain, anal bleeding, blood in stool and constipation.   Endocrine: Negative for cold intolerance, heat intolerance, polydipsia and polyuria.   Genitourinary: Negative for dysuria, flank pain, frequency, hematuria and urgency.   Musculoskeletal: Negative for arthralgias, back pain, joint swelling and myalgias.   Skin: Negative for color change and rash.   Neurological: Negative for dizziness, seizures, syncope, facial asymmetry, speech difficulty, weakness, light-headedness, numbness and headaches.   Hematological: Negative for adenopathy. Does not bruise/bleed easily.   Psychiatric/Behavioral: Negative for agitation, confusion, hallucinations and suicidal ideas.     Objective:     Vital Signs (Most Recent):  Temp: 97.2 °F (36.2 °C) (12/06/17 0420)  Pulse: 75 (12/06/17 0420)  Resp: 18 (12/06/17 0420)  BP: (!) 113/58 (12/06/17 0420)  SpO2: 98 % (12/06/17 0420) Vital Signs (24h Range):  Temp:  [97.2 °F (36.2 °C)-97.7 °F (36.5 °C)] 97.2 °F (36.2 °C)  Pulse:  [75-98] 75  Resp:  [18-26] 18  SpO2:  [95 %-100 %] 98 %  BP: (112-168)/(57-85) 113/58        There is no height or weight on file to calculate BMI.    Physical Exam   Constitutional: He is oriented to person, place, and time. He appears well-developed and well-nourished. No distress.   HENT:   Head: Normocephalic and atraumatic.   Neck: Neck supple. Carotid bruit is not present. No thyromegaly present.   Cardiovascular: Normal rate and regular rhythm.  Exam reveals no gallop.    No murmur heard.  Pulmonary/Chest: Effort normal and breath sounds normal. No respiratory distress. He has no wheezes.   Abdominal: Bowel sounds are normal. He exhibits no distension. There is no splenomegaly or hepatomegaly. There is no tenderness.   Musculoskeletal: Normal  range of motion. He exhibits no edema.   Neurological: He is alert and oriented to person, place, and time. No cranial nerve deficit or sensory deficit.   Skin: Skin is warm and dry. No rash noted.   Psychiatric: He has a normal mood and affect. His behavior is normal.           Significant Labs:   CBC:   Recent Labs  Lab 12/05/17  2242   WBC 17.23*   HGB 13.4*   HCT 43.9   *     CMP:   Recent Labs  Lab 12/05/17  2242      K 4.8      CO2 19*   *   BUN 23   CREATININE 2.1*   CALCIUM 8.4*   PROT 6.8   ALBUMIN 3.6   BILITOT 1.2*   ALKPHOS 92   AST 17   ALT 8*   ANIONGAP 11   EGFRNONAA 28.7*       Significant Imaging: I have reviewed all pertinent imaging results/findings within the past 24 hours.    Assessment/Plan:     * Low phosphate levels    - Phosphate <1.0.  - Patient received KPhos 40mmol in ER.  WIll monitor.          Colitis    - WBC 17.23.  Patient is afebrile.  - Cultures, stool studies ordered.  - Continue Cipro and Flagyl.  - Clear liquid diet, advance as tolerated.          CKD (chronic kidney disease) stage 3, GFR 30-59 ml/min    - Stable with creatinine of 2.1, GFR 28.7.          Benign essential HTN    - Continue benazepril 20mg daily, metoprolol succinate 50mg daily.          Coronary artery disease involving native coronary artery of native heart without angina pectoris    - Continue secondary prevention with ASA 81mg daily, benazepril 20mg daily, Plavix daily, metoprolol succinate 50mg daily, and pravastatin 40mg daily.            VTE Risk Mitigation         Ordered     Medium Risk of VTE  Once      12/06/17 0335     Place sequential compression device  Until discontinued      12/06/17 0335     Place BONI hose  Until discontinued      12/06/17 0335             Farhana Soler PA-C  Department of Hospital Medicine   [facility]

## 2017-12-06 NOTE — ED PROVIDER NOTES
Encounter Date: 12/5/2017    SCRIBE #1 NOTE: I, Bert Cuellar, zachary scribing for, and in the presence of, Dr. Mas . the Resident attestation.       History     Chief Complaint   Patient presents with    Emesis     pt reports diarrhea, n/v ,weakness, sob  x 24 hours. pt denies any chest pain. pt also reports stool softner use for 3 days. small amount of blood reported when wiping.       This is an 82-year-old ae with a hx of CAD who presents with multiple episode of nonbloody emesis and diarrhea today.  Patient stated usually suffers form constipation and is taking Colace but since this morning has had at least 5 episodes of emesis and diarrhea.  He denies bloody stools, or abdominal pain.  Denies fever, chills, chest pain, shortness of breath, recent antibiotics use a recent hospitalization.  Patient is denying having eaten out and denies any sick contact.          Review of patient's allergies indicates:  No Known Allergies  Past Medical History:   Diagnosis Date    Age-related osteoporosis without current pathological fracture 6/6/2017    Benign prostatic hyperplasia 05/10/2017    Cancer     throat    Cataract     CKD (chronic kidney disease) stage 3, GFR 30-59 ml/min 5/12/2017    Constipation 5/10/2017    Coronary artery disease involving native coronary artery of native heart without angina pectoris 05/10/2017    cards/Dr. Melva Reilly    H/O heart artery stent     LAD 9/22/16 Zotarolimus eluting coronary stent system- [facility]    History of throat cancer 1997    s/p surgery, XRT    Hypertension     Insomnia     Primary osteoarthritis of right knee 05/10/2017    sees ortho, knee replacement recommended     Past Surgical History:   Procedure Laterality Date    CATARACT EXTRACTION Right     july 2017    CORONARY ANGIOPLASTY WITH STENT PLACEMENT      LCx 1999, mid RCA 2010, LAD 9/2016; cards/Dr. Melva Reilly    THROAT SURGERY      2/2 throat cancer     Family History   Problem Relation Age  of Onset    Cancer Mother      stomach    Hypertension Mother     Hypertension Daughter     Hypertension Son     Hypertension Daughter     Blindness Neg Hx     Amblyopia Neg Hx     Cataracts Neg Hx     Glaucoma Neg Hx     Macular degeneration Neg Hx     Retinal detachment Neg Hx     Strabismus Neg Hx     Diabetes Neg Hx     Stroke Neg Hx     Thyroid disease Neg Hx      Social History   Substance Use Topics    Smoking status: Former Smoker     Years: 50.00     Types: Cigarettes    Smokeless tobacco: Never Used    Alcohol use No     Review of Systems   Constitutional: Negative for fever.   HENT: Negative for sore throat.    Respiratory: Negative for shortness of breath.    Cardiovascular: Negative for chest pain.   Gastrointestinal: Positive for diarrhea, nausea and vomiting. Negative for abdominal pain, blood in stool and rectal pain.   Genitourinary: Negative for dysuria.   Musculoskeletal: Negative for back pain.   Skin: Negative for rash.   Neurological: Negative for weakness.   Hematological: Does not bruise/bleed easily.       Physical Exam     Initial Vitals [12/05/17 2043]   BP Pulse Resp Temp SpO2   (!) 140/63 91 (!) 22 97.7 °F (36.5 °C) 100 %      MAP       88.67         Physical Exam    Nursing note and vitals reviewed.  Constitutional: He is not diaphoretic. No distress.   HENT:   Head: Normocephalic and atraumatic.   Eyes: EOM are normal. Pupils are equal, round, and reactive to light.   Neck: Normal range of motion.   Cardiovascular: Normal rate, regular rhythm and normal heart sounds. Exam reveals no friction rub.    No murmur heard.  Pulmonary/Chest: Breath sounds normal. No respiratory distress. He has no wheezes. He has no rhonchi. He has no rales. He exhibits no tenderness.   Abdominal: Soft. Bowel sounds are normal. He exhibits no distension. There is no tenderness. There is no rebound and no guarding.   Musculoskeletal: Normal range of motion. He exhibits no edema or tenderness.    Lymphadenopathy:     He has no cervical adenopathy.   Neurological: He is alert and oriented to person, place, and time. No cranial nerve deficit or sensory deficit.   Skin: No rash noted.         ED Course   Procedures  Labs Reviewed   CBC W/ AUTO DIFFERENTIAL - Abnormal; Notable for the following:        Result Value    WBC 17.23 (*)     Hemoglobin 13.4 (*)     MCV 77 (*)     MCH 23.4 (*)     MCHC 30.5 (*)     RDW 16.1 (*)     Platelets 451 (*)     Immature Granulocytes 0.6 (*)     Gran # 16.1 (*)     Immature Grans (Abs) 0.11 (*)     Lymph # 0.2 (*)     Gran% 93.6 (*)     Lymph% 1.3 (*)     All other components within normal limits   COMPREHENSIVE METABOLIC PANEL - Abnormal; Notable for the following:     CO2 19 (*)     Glucose 149 (*)     Creatinine 2.1 (*)     Calcium 8.4 (*)     Total Bilirubin 1.2 (*)     ALT 8 (*)     eGFR if  33.1 (*)     eGFR if non  28.7 (*)     All other components within normal limits   MAGNESIUM   PHOSPHORUS                   APC / Resident Notes:    This is an 82-year-old male with a history of CAD presents with nausea/vomiting and diarrhea    Ddx: Colitis, gastroenteritis, biliary disease,  Electrolyte abnormality,dehydration,    Workup:  Labs, imaging Iv fluids, antiemetic and reassess.    12:39 AM                 Scribe Attestation:   Scribe #1: I performed the above scribed service and the documentation accurately describes the services I performed. I attest to the accuracy of the note.    Attending Attestation:   Physician Attestation Statement for Resident:  As the supervising MD   Physician Attestation Statement: I have personally seen and examined this patient.   I agree with the above history. -:   As the supervising MD I agree with the above PE.   -: Abdomen is distended and soft. He denies recent travel, camping, and taking antibiotics.    As the supervising MD I agree with the above treatment, course, plan, and disposition.              labs with leukocytosis WBC 17.  Hemoglobin a baseline.  elevated creatinine but near baseline.      patient is started on antibiotics , hydration and antiemetc.  He is found to have low phosphate.  We ursula replete and admit for overnight observation.    Bib Car MD   Emergency Medicine PGY 3   4:46 AM 12/6/2017            ED Course      Clinical Impression:   Diagnoses of Emesis and Nausea & vomiting were pertinent to this visit.                           Bib Car MD  Resident  12/06/17 0446

## 2017-12-06 NOTE — SUBJECTIVE & OBJECTIVE
Past Medical History:   Diagnosis Date    Age-related osteoporosis without current pathological fracture 6/6/2017    Benign prostatic hyperplasia 05/10/2017    Cancer     throat    Cataract     CKD (chronic kidney disease) stage 3, GFR 30-59 ml/min 5/12/2017    Constipation 5/10/2017    Coronary artery disease involving native coronary artery of native heart without angina pectoris 05/10/2017    cards/Dr. Melva Reilly    H/O heart artery stent     LAD 9/22/16 Zotarolimus eluting coronary stent system- [facility]    History of throat cancer 1997    s/p surgery, XRT    Hypertension     Insomnia     Primary osteoarthritis of right knee 05/10/2017    sees ortho, knee replacement recommended       Past Surgical History:   Procedure Laterality Date    CATARACT EXTRACTION Right     july 2017    CORONARY ANGIOPLASTY WITH STENT PLACEMENT      LCx 1999, mid RCA 2010, LAD 9/2016; cards/Dr. Melva Reilly    THROAT SURGERY      2/2 throat cancer       Review of patient's allergies indicates:  No Known Allergies    No current facility-administered medications on file prior to encounter.      Current Outpatient Prescriptions on File Prior to Encounter   Medication Sig    amlodipine (NORVASC) 10 MG tablet Take 1 tablet (10 mg total) by mouth once daily.    aspirin 81 MG Chew Take 81 mg by mouth once daily.    clopidogrel (PLAVIX) 75 mg tablet Take 1 tablet (75 mg total) by mouth once daily.    ERGOCALCIFEROL, VITAMIN D2, (VITAMIN D ORAL) Take 50,000 Int'l Units by mouth. Every other week    metoprolol succinate (TOPROL-XL) 50 MG 24 hr tablet Take 1 tablet (50 mg total) by mouth once daily.    omeprazole (PRILOSEC) 20 MG capsule TAKE 1 CAPSULE BY MOUTH ONCE DAILY AT 6 AM.    pravastatin (PRAVACHOL) 40 MG tablet Take 1 tablet (40 mg total) by mouth once daily.    tamsulosin (FLOMAX) 0.4 mg Cp24 Take 1 capsule (0.4 mg total) by mouth once daily.    zolpidem (AMBIEN CR) 6.25 MG CR tablet TAKE 1 TABLET(6.25 MG)  BY MOUTH EVERY NIGHT AS NEEDED FOR INSOMNIA    benazepril (LOTENSIN) 20 MG tablet Take 1 tablet (20 mg total) by mouth once daily.     Family History     Problem Relation (Age of Onset)    Cancer Mother    Hypertension Mother, Daughter, Son, Daughter        Social History Main Topics    Smoking status: Former Smoker     Years: 50.00     Types: Cigarettes    Smokeless tobacco: Never Used    Alcohol use No    Drug use: No    Sexual activity: No     Review of Systems   Constitutional: Negative for activity change, appetite change, chills, diaphoresis, fatigue, fever and unexpected weight change.   HENT: Negative for congestion, rhinorrhea, sore throat, trouble swallowing and voice change.    Eyes: Negative for visual disturbance.   Respiratory: Negative for cough, choking, chest tightness, shortness of breath and wheezing.    Cardiovascular: Negative for chest pain, palpitations and leg swelling.   Gastrointestinal: Positive for diarrhea, nausea and vomiting. Negative for abdominal distention, abdominal pain, anal bleeding, blood in stool and constipation.   Endocrine: Negative for cold intolerance, heat intolerance, polydipsia and polyuria.   Genitourinary: Negative for dysuria, flank pain, frequency, hematuria and urgency.   Musculoskeletal: Negative for arthralgias, back pain, joint swelling and myalgias.   Skin: Negative for color change and rash.   Neurological: Negative for dizziness, seizures, syncope, facial asymmetry, speech difficulty, weakness, light-headedness, numbness and headaches.   Hematological: Negative for adenopathy. Does not bruise/bleed easily.   Psychiatric/Behavioral: Negative for agitation, confusion, hallucinations and suicidal ideas.     Objective:     Vital Signs (Most Recent):  Temp: 97.2 °F (36.2 °C) (12/06/17 0420)  Pulse: 75 (12/06/17 0420)  Resp: 18 (12/06/17 0420)  BP: (!) 113/58 (12/06/17 0420)  SpO2: 98 % (12/06/17 0420) Vital Signs (24h Range):  Temp:  [97.2 °F (36.2 °C)-97.7  °F (36.5 °C)] 97.2 °F (36.2 °C)  Pulse:  [75-98] 75  Resp:  [18-26] 18  SpO2:  [95 %-100 %] 98 %  BP: (112-168)/(57-85) 113/58        There is no height or weight on file to calculate BMI.    Physical Exam   Constitutional: He is oriented to person, place, and time. He appears well-developed and well-nourished. No distress.   HENT:   Head: Normocephalic and atraumatic.   Neck: Neck supple. Carotid bruit is not present. No thyromegaly present.   Cardiovascular: Normal rate and regular rhythm.  Exam reveals no gallop.    No murmur heard.  Pulmonary/Chest: Effort normal and breath sounds normal. No respiratory distress. He has no wheezes.   Abdominal: Bowel sounds are normal. He exhibits no distension. There is no splenomegaly or hepatomegaly. There is no tenderness.   Musculoskeletal: Normal range of motion. He exhibits no edema.   Neurological: He is alert and oriented to person, place, and time. No cranial nerve deficit or sensory deficit.   Skin: Skin is warm and dry. No rash noted.   Psychiatric: He has a normal mood and affect. His behavior is normal.           Significant Labs:   CBC:   Recent Labs  Lab 12/05/17  2242   WBC 17.23*   HGB 13.4*   HCT 43.9   *     CMP:   Recent Labs  Lab 12/05/17  2242      K 4.8      CO2 19*   *   BUN 23   CREATININE 2.1*   CALCIUM 8.4*   PROT 6.8   ALBUMIN 3.6   BILITOT 1.2*   ALKPHOS 92   AST 17   ALT 8*   ANIONGAP 11   EGFRNONAA 28.7*       Significant Imaging: I have reviewed all pertinent imaging results/findings within the past 24 hours.

## 2017-12-06 NOTE — ASSESSMENT & PLAN NOTE
- Continue secondary prevention with ASA 81mg daily, benazepril 20mg daily, Plavix daily, metoprolol succinate 50mg daily, and pravastatin 40mg daily.

## 2017-12-06 NOTE — HPI
Patient is an 82 year old gentleman with a h/o CAD, CKD III, HTN.  He presents with N/V/D.  Symptoms began today.  He states he has been taking Colace for three days due to constipation  This morning, however, he started having diarrhea.  He has had 5 episodes of vomiting since this morning.  He denies abdominal pain, chest pain, SOB, dizziness, palpitations, fever/chills, sick contacts.

## 2017-12-07 VITALS
WEIGHT: 147.69 LBS | TEMPERATURE: 98 F | DIASTOLIC BLOOD PRESSURE: 66 MMHG | BODY MASS INDEX: 25.21 KG/M2 | OXYGEN SATURATION: 95 % | HEIGHT: 64 IN | SYSTOLIC BLOOD PRESSURE: 123 MMHG | RESPIRATION RATE: 16 BRPM | HEART RATE: 76 BPM

## 2017-12-07 LAB
ANION GAP SERPL CALC-SCNC: 8 MMOL/L
BASOPHILS # BLD AUTO: 0.05 K/UL
BASOPHILS NFR BLD: 0.4 %
BUN SERPL-MCNC: 23 MG/DL
C DIFF GDH STL QL: NEGATIVE
C DIFF TOX A+B STL QL IA: NEGATIVE
CALCIUM SERPL-MCNC: 6.7 MG/DL
CHLORIDE SERPL-SCNC: 109 MMOL/L
CO2 SERPL-SCNC: 21 MMOL/L
CREAT SERPL-MCNC: 2 MG/DL
DIFFERENTIAL METHOD: ABNORMAL
EOSINOPHIL # BLD AUTO: 0.2 K/UL
EOSINOPHIL NFR BLD: 2.1 %
ERYTHROCYTE [DISTWIDTH] IN BLOOD BY AUTOMATED COUNT: 16.2 %
EST. GFR  (AFRICAN AMERICAN): 35.1 ML/MIN/1.73 M^2
EST. GFR  (NON AFRICAN AMERICAN): 30.4 ML/MIN/1.73 M^2
ESTIMATED AVG GLUCOSE: 111 MG/DL
GLUCOSE SERPL-MCNC: 88 MG/DL
HBA1C MFR BLD HPLC: 5.5 %
HCT VFR BLD AUTO: 37.4 %
HGB BLD-MCNC: 11.5 G/DL
IMM GRANULOCYTES # BLD AUTO: 0.07 K/UL
IMM GRANULOCYTES NFR BLD AUTO: 0.6 %
LYMPHOCYTES # BLD AUTO: 0.6 K/UL
LYMPHOCYTES NFR BLD: 4.9 %
MAGNESIUM SERPL-MCNC: 1.6 MG/DL
MCH RBC QN AUTO: 23.9 PG
MCHC RBC AUTO-ENTMCNC: 30.7 G/DL
MCV RBC AUTO: 78 FL
MONOCYTES # BLD AUTO: 0.8 K/UL
MONOCYTES NFR BLD: 6.8 %
NEUTROPHILS # BLD AUTO: 9.6 K/UL
NEUTROPHILS NFR BLD: 85.2 %
NRBC BLD-RTO: 0 /100 WBC
PHOSPHATE SERPL-MCNC: 1.8 MG/DL
PLATELET # BLD AUTO: 378 K/UL
PMV BLD AUTO: 11.9 FL
POTASSIUM SERPL-SCNC: 4.1 MMOL/L
RBC # BLD AUTO: 4.82 M/UL
SODIUM SERPL-SCNC: 138 MMOL/L
WBC # BLD AUTO: 11.29 K/UL
WBC #/AREA STL HPF: NORMAL /[HPF]

## 2017-12-07 PROCEDURE — 87449 NOS EACH ORGANISM AG IA: CPT

## 2017-12-07 PROCEDURE — 89055 LEUKOCYTE ASSESSMENT FECAL: CPT

## 2017-12-07 PROCEDURE — 25000003 PHARM REV CODE 250: Performed by: INTERNAL MEDICINE

## 2017-12-07 PROCEDURE — 63600175 PHARM REV CODE 636 W HCPCS: Performed by: PHYSICIAN ASSISTANT

## 2017-12-07 PROCEDURE — 83735 ASSAY OF MAGNESIUM: CPT

## 2017-12-07 PROCEDURE — 85025 COMPLETE CBC W/AUTO DIFF WBC: CPT

## 2017-12-07 PROCEDURE — 36415 COLL VENOUS BLD VENIPUNCTURE: CPT

## 2017-12-07 PROCEDURE — 80048 BASIC METABOLIC PNL TOTAL CA: CPT

## 2017-12-07 PROCEDURE — 25000003 PHARM REV CODE 250: Performed by: PHYSICIAN ASSISTANT

## 2017-12-07 PROCEDURE — 87045 FECES CULTURE AEROBIC BACT: CPT

## 2017-12-07 PROCEDURE — 99238 HOSP IP/OBS DSCHRG MGMT 30/<: CPT | Mod: ,,, | Performed by: INTERNAL MEDICINE

## 2017-12-07 PROCEDURE — 83036 HEMOGLOBIN GLYCOSYLATED A1C: CPT

## 2017-12-07 PROCEDURE — 84100 ASSAY OF PHOSPHORUS: CPT

## 2017-12-07 PROCEDURE — 87046 STOOL CULTR AEROBIC BACT EA: CPT | Mod: 59

## 2017-12-07 PROCEDURE — 94799 UNLISTED PULMONARY SVC/PX: CPT

## 2017-12-07 PROCEDURE — 87427 SHIGA-LIKE TOXIN AG IA: CPT

## 2017-12-07 RX ORDER — SODIUM,POTASSIUM PHOSPHATES 280-250MG
1 POWDER IN PACKET (EA) ORAL ONCE
Status: COMPLETED | OUTPATIENT
Start: 2017-12-07 | End: 2017-12-07

## 2017-12-07 RX ADMIN — TAMSULOSIN HYDROCHLORIDE 0.4 MG: 0.4 CAPSULE ORAL at 08:12

## 2017-12-07 RX ADMIN — STANDARDIZED SENNA CONCENTRATE AND DOCUSATE SODIUM 1 TABLET: 8.6; 5 TABLET, FILM COATED ORAL at 08:12

## 2017-12-07 RX ADMIN — AMLODIPINE BESYLATE 10 MG: 10 TABLET ORAL at 08:12

## 2017-12-07 RX ADMIN — POTASSIUM & SODIUM PHOSPHATES POWDER PACK 280-160-250 MG 1 PACKET: 280-160-250 PACK at 08:12

## 2017-12-07 RX ADMIN — CIPROFLOXACIN 400 MG: 2 INJECTION, SOLUTION INTRAVENOUS at 12:12

## 2017-12-07 RX ADMIN — METRONIDAZOLE 500 MG: 500 TABLET ORAL at 12:12

## 2017-12-07 RX ADMIN — ACETAMINOPHEN 650 MG: 325 TABLET ORAL at 03:12

## 2017-12-07 RX ADMIN — CLOPIDOGREL 75 MG: 75 TABLET, FILM COATED ORAL at 08:12

## 2017-12-07 RX ADMIN — ASPIRIN 81 MG CHEWABLE TABLET 81 MG: 81 TABLET CHEWABLE at 08:12

## 2017-12-07 RX ADMIN — PRAVASTATIN SODIUM 40 MG: 40 TABLET ORAL at 08:12

## 2017-12-07 RX ADMIN — METOPROLOL SUCCINATE 50 MG: 50 TABLET, EXTENDED RELEASE ORAL at 08:12

## 2017-12-07 RX ADMIN — BENAZEPRIL HYDROCHLORIDE 20 MG: 20 TABLET, FILM COATED ORAL at 08:12

## 2017-12-07 RX ADMIN — METRONIDAZOLE 500 MG: 500 TABLET ORAL at 08:12

## 2017-12-07 RX ADMIN — PANTOPRAZOLE SODIUM 40 MG: 40 TABLET, DELAYED RELEASE ORAL at 08:12

## 2017-12-07 NOTE — PLAN OF CARE
Problem: Patient Care Overview  Goal: Plan of Care Review  Outcome: Ongoing (interventions implemented as appropriate)  Patient remained free from falls or injury. All care explained. Questions addressed. Medicated for pain as per PRN order. Stool specimen collected and sent to lab. Bed in low and locked position. Call light and personal possessions are within reach. Will continue to monitor.

## 2017-12-07 NOTE — PLAN OF CARE
Problem: Patient Care Overview  Goal: Plan of Care Review  Plan of care reviewed with patient. No distress noted at this time. No nausea and vomiting noted at this time. Fall precautions maintained. IV fluids infusing. Will continue to monitor closely.

## 2017-12-07 NOTE — PROGRESS NOTES
Paged Dr. Muller to inform of Phos= 1.8.  0754 am: Dr. Muller returned page and informed of above. No new orders noted at this time.

## 2017-12-07 NOTE — PROGRESS NOTES
Reviewed AVS with patient/daughter with full understanding stated.  Discharged ambulatory in stable condition; no questions/concerns regarding discharge.

## 2017-12-08 NOTE — ASSESSMENT & PLAN NOTE
- WBC 17.23.  Patient is afebrile.  - Cultures, stool studies ordered.  - Clear liquid diet, advance as tolerated.

## 2017-12-08 NOTE — DISCHARGE SUMMARY
[facility] Hospital Medicine  Discharge Summary      Patient Name: Neville Cruz  MRN: [id number]  Admission Date: 12/5/2017  Hospital Length of Stay: 1 days  Discharge Date and Time: 12/7/2017  9:51 AM  Attending Physician: No att. providers found   Discharging Provider: Woody Muller MD  Primary Care Provider: Liberty Berger MD  [facility] Medicine Team: [facility] HOSP MED B Woody Muller MD    HPI:   Patient is an 82 year old gentleman with a h/o CAD, CKD III, HTN.  He presents with N/V/D.  Symptoms began today.  He states he has been taking Colace for three days due to constipation  This morning, however, he started having diarrhea.  He has had 5 episodes of vomiting since this morning.  He denies abdominal pain, chest pain, SOB, dizziness, palpitations, fever/chills, sick contacts.      * No surgery found *      Hospital Course:   Patient is an 82 year old gentleman with a h/o CAD, CKD III, HTN.  He presents with N/V/D.  Symptoms began today.  He states he has been taking Colace for three days due to constipation  This morning, however, he started having diarrhea.  He has had 5 episodes of vomiting since this morning.  He denies abdominal pain, chest pain, SOB, dizziness, palpitations, fever/chills, sick contacts.     Diahrea resolved. Stool studies were negative. Will dc home on no abx.     Consults:     Colitis    - WBC 17.23.  Patient is afebrile.  - Cultures, stool studies ordered.  - Clear liquid diet, advance as tolerated.            Final Active Diagnoses:    Diagnosis Date Noted POA    PRINCIPAL PROBLEM:  Low phosphate levels [E83.39] 12/06/2017 Yes    Colitis [K52.9] 12/06/2017 Yes    CKD (chronic kidney disease) stage 3, GFR 30-59 ml/min [N18.3] 05/12/2017 Yes     Chronic    Coronary artery disease involving native coronary artery of native heart without angina pectoris [I25.10] 05/10/2017 Yes     Chronic    Benign essential HTN [I10] 05/10/2017 Yes     Chronic      Problems  Resolved During this Admission:    Diagnosis Date Noted Date Resolved POA       Discharged Condition: good    Disposition: Home or Self Care    Follow Up:  Follow-up Information     RIVAS Falk On 12/14/2017.    Specialty:  Internal Medicine  Why:  @ 4:00  Contact information:  [address]  [facility] [id number]  [phone]                 Patient Instructions:   No discharge procedures on file.    Significant Diagnostic Studies: Labs:   BMP:   Recent Labs  Lab 12/07/17  0346   GLU 88      K 4.1      CO2 21*   BUN 23   CREATININE 2.0*   CALCIUM 6.7*   MG 1.6       Pending Diagnostic Studies:     None         Medications:  Reconciled Home Medications:   Discharge Medication List as of 12/7/2017  7:53 AM      CONTINUE these medications which have NOT CHANGED    Details   amlodipine (NORVASC) 10 MG tablet Take 1 tablet (10 mg total) by mouth once daily., Starting 5/10/2017, Until Thu 5/10/18, Normal      aspirin 81 MG Chew Take 81 mg by mouth once daily., Until Discontinued, Historical Med      benazepril (LOTENSIN) 20 MG tablet Take 1 tablet (20 mg total) by mouth once daily., Starting 5/10/2017, Until Thu 5/10/18, Normal      clopidogrel (PLAVIX) 75 mg tablet Take 1 tablet (75 mg total) by mouth once daily., Starting 5/10/2017, Until Thu 5/10/18, Normal      docusate sodium (COLACE) 100 MG capsule Take 100 mg by mouth 2 (two) times daily., Historical Med      ERGOCALCIFEROL, VITAMIN D2, (VITAMIN D ORAL) Take 50,000 Int'l Units by mouth. Every other week, Historical Med      metoprolol succinate (TOPROL-XL) 50 MG 24 hr tablet Take 1 tablet (50 mg total) by mouth once daily., Starting 5/10/2017, Until Thu 5/10/18, Normal      omeprazole (PRILOSEC) 20 MG capsule TAKE 1 CAPSULE BY MOUTH ONCE DAILY AT 6 AM., Normal      pravastatin (PRAVACHOL) 40 MG tablet Take 1 tablet (40 mg total) by mouth once daily., Starting 5/10/2017, Until Thu 5/10/18, Normal      tamsulosin (FLOMAX) 0.4 mg Cp24  Take 1 capsule (0.4 mg total) by mouth once daily., Starting 5/10/2017, Until u 5/10/18, Normal      zolpidem (AMBIEN CR) 6.25 MG CR tablet TAKE 1 TABLET(6.25 MG) BY MOUTH EVERY NIGHT AS NEEDED FOR INSOMNIA, Normal             Indwelling Lines/Drains at time of discharge:   Lines/Drains/Airways          No matching active lines, drains, or airways          Time spent on the discharge of patient: 30 minutes  Patient was seen and examined on the date of discharge and determined to be suitable for discharge.         Woody Muller MD  Department of Hospital Medicine  [facility]

## 2017-12-11 ENCOUNTER — PATIENT MESSAGE (OUTPATIENT)
Dept: ADMINISTRATIVE | Facility: OTHER | Age: 82
End: 2017-12-11

## 2017-12-11 LAB
BACTERIA BLD CULT: NORMAL
BACTERIA STL CULT: NORMAL

## 2017-12-21 ENCOUNTER — OFFICE VISIT (OUTPATIENT)
Dept: PRIMARY CARE CLINIC | Facility: CLINIC | Age: 82
End: 2017-12-21
Payer: MEDICARE

## 2017-12-21 ENCOUNTER — TELEPHONE (OUTPATIENT)
Dept: INTERNAL MEDICINE | Facility: CLINIC | Age: 82
End: 2017-12-21

## 2017-12-21 ENCOUNTER — HOSPITAL ENCOUNTER (OUTPATIENT)
Dept: RADIOLOGY | Facility: HOSPITAL | Age: 82
Discharge: HOME | End: 2017-12-21
Attending: NURSE PRACTITIONER
Payer: MEDICARE

## 2017-12-21 VITALS
OXYGEN SATURATION: 97 % | DIASTOLIC BLOOD PRESSURE: 64 MMHG | SYSTOLIC BLOOD PRESSURE: 122 MMHG | HEART RATE: 68 BPM | WEIGHT: 151.25 LBS | HEIGHT: 64 IN | BODY MASS INDEX: 25.82 KG/M2

## 2017-12-21 DIAGNOSIS — N40.0 BENIGN PROSTATIC HYPERPLASIA, UNSPECIFIED WHETHER LOWER URINARY TRACT SYMPTOMS PRESENT: ICD-10-CM

## 2017-12-21 DIAGNOSIS — M81.0 OSTEOPOROSIS, UNSPECIFIED OSTEOPOROSIS TYPE, UNSPECIFIED PATHOLOGICAL FRACTURE PRESENCE: ICD-10-CM

## 2017-12-21 DIAGNOSIS — E55.9 VITAMIN D DEFICIENCY: ICD-10-CM

## 2017-12-21 DIAGNOSIS — M19.011 OSTEOARTHRITIS OF RIGHT SHOULDER, UNSPECIFIED OSTEOARTHRITIS TYPE: ICD-10-CM

## 2017-12-21 DIAGNOSIS — I10 BENIGN ESSENTIAL HTN: Chronic | ICD-10-CM

## 2017-12-21 DIAGNOSIS — E83.39 LOW PHOSPHATE LEVELS: ICD-10-CM

## 2017-12-21 DIAGNOSIS — Z87.11 HISTORY OF GASTRIC ULCER: ICD-10-CM

## 2017-12-21 DIAGNOSIS — I25.2 HISTORY OF MI (MYOCARDIAL INFARCTION): ICD-10-CM

## 2017-12-21 DIAGNOSIS — I25.10 CORONARY ARTERY DISEASE, ANGINA PRESENCE UNSPECIFIED, UNSPECIFIED VESSEL OR LESION TYPE, UNSPECIFIED WHETHER NATIVE OR TRANSPLANTED HEART: ICD-10-CM

## 2017-12-21 DIAGNOSIS — Z95.5 HISTORY OF CORONARY ARTERY STENT PLACEMENT: ICD-10-CM

## 2017-12-21 DIAGNOSIS — K92.2 GASTROINTESTINAL HEMORRHAGE, UNSPECIFIED GASTROINTESTINAL HEMORRHAGE TYPE: ICD-10-CM

## 2017-12-21 DIAGNOSIS — F51.01 PRIMARY INSOMNIA: ICD-10-CM

## 2017-12-21 DIAGNOSIS — N18.30 CHRONIC KIDNEY DISEASE (CKD), STAGE III (MODERATE): ICD-10-CM

## 2017-12-21 DIAGNOSIS — K52.9 COLITIS: Primary | ICD-10-CM

## 2017-12-21 DIAGNOSIS — I25.10 CORONARY ARTERY DISEASE INVOLVING NATIVE CORONARY ARTERY OF NATIVE HEART WITHOUT ANGINA PECTORIS: Chronic | ICD-10-CM

## 2017-12-21 DIAGNOSIS — K59.09 CHRONIC CONSTIPATION: ICD-10-CM

## 2017-12-21 DIAGNOSIS — N18.30 CKD (CHRONIC KIDNEY DISEASE) STAGE 3, GFR 30-59 ML/MIN: Chronic | ICD-10-CM

## 2017-12-21 PROBLEM — R11.2 NAUSEA & VOMITING: Status: RESOLVED | Noted: 2017-12-06 | Resolved: 2017-12-21

## 2017-12-21 PROBLEM — R74.8 ELEVATED ALKALINE PHOSPHATASE LEVEL: Status: RESOLVED | Noted: 2017-05-12 | Resolved: 2017-12-21

## 2017-12-21 PROBLEM — K59.00 CONSTIPATION: Status: RESOLVED | Noted: 2017-05-10 | Resolved: 2017-12-21

## 2017-12-21 PROCEDURE — 74020 XR ABDOMEN FLAT AND ERECT: CPT | Mod: TC

## 2017-12-21 PROCEDURE — 99214 OFFICE O/P EST MOD 30 MIN: CPT | Mod: S$GLB,,, | Performed by: NURSE PRACTITIONER

## 2017-12-21 PROCEDURE — 99999 PR PBB SHADOW E&M-EST. PATIENT-LVL V: CPT | Mod: PBBFAC,,, | Performed by: NURSE PRACTITIONER

## 2017-12-21 PROCEDURE — 99499 UNLISTED E&M SERVICE: CPT | Mod: S$GLB,,, | Performed by: NURSE PRACTITIONER

## 2017-12-21 PROCEDURE — 74020 XR ABDOMEN FLAT AND ERECT: CPT | Mod: 26,,, | Performed by: RADIOLOGY

## 2017-12-21 RX ORDER — SODIUM,POTASSIUM PHOSPHATES 280-250MG
1 POWDER IN PACKET (EA) ORAL
Qty: 4 PACKET | Refills: 0 | Status: SHIPPED | OUTPATIENT
Start: 2017-12-21 | End: 2017-12-22

## 2017-12-21 RX ORDER — CIPROFLOXACIN 500 MG/1
500 TABLET ORAL 2 TIMES DAILY
Qty: 20 TABLET | Refills: 0 | Status: SHIPPED | OUTPATIENT
Start: 2017-12-21 | End: 2017-12-31

## 2017-12-21 RX ORDER — ACETAMINOPHEN AND CODEINE PHOSPHATE 300; 30 MG/1; MG/1
TABLET ORAL
Qty: 60 TABLET | Refills: 0 | Status: SHIPPED | OUTPATIENT
Start: 2017-12-21 | End: 2018-01-01 | Stop reason: SDUPTHER

## 2017-12-21 RX ORDER — METRONIDAZOLE 500 MG/1
500 TABLET ORAL 3 TIMES DAILY
Qty: 30 TABLET | Refills: 0 | Status: SHIPPED | OUTPATIENT
Start: 2017-12-21 | End: 2017-12-31

## 2017-12-21 RX ORDER — POLYETHYLENE GLYCOL 3350 17 G/17G
17 POWDER, FOR SOLUTION ORAL DAILY
Qty: 1 BOTTLE | Refills: 6 | Status: SHIPPED | OUTPATIENT
Start: 2017-12-21

## 2017-12-21 RX ORDER — VIT C/E/ZN/COPPR/LUTEIN/ZEAXAN 250MG-90MG
1000 CAPSULE ORAL DAILY
Qty: 30 CAPSULE | Refills: 1 | Status: SHIPPED | OUTPATIENT
Start: 2017-12-21 | End: 2018-01-01 | Stop reason: SDUPTHER

## 2017-12-21 NOTE — PROGRESS NOTES
[facility]  Follow-up Visit Progress Note     PRESENTING HISTORY     PCP: Liberty Berger MD  Chief Complaint/Reason for Visit:  Follow up from recent visit.      No chief complaint on file.      History of Present Illness & ROS: Mr. Neville Cruz is a 82 y.o. male.    [facility] Hospital Medicine  Discharge Summary        Patient Name: Neville Cruz  MRN: [id number]  Admission Date: 12/5/2017  Hospital Length of Stay: 1 days  Discharge Date and Time: 12/7/2017  9:51 AM  Attending Physician: No att. providers found   Discharging Provider: Woody Muller MD  Primary Care Provider: Liberty Berger MD  [facility] Medicine Team: [facility] MED B Woody Muller MD     HPI:   Patient is an 82 year old gentleman with a h/o CAD, CKD III, HTN.  He presents with N/V/D.  Symptoms began today.  He states he has been taking Colace for three days due to constipation  This morning, however, he started having diarrhea.  He has had 5 episodes of vomiting since this morning.  He denies abdominal pain, chest pain, SOB, dizziness, palpitations, fever/chills, sick contacts.       * No surgery found *       Hospital Course:   Patient is an 82 year old gentleman with a h/o CAD, CKD III, HTN.  He presents with N/V/D.  Symptoms began today.  He states he has been taking Colace for three days due to constipation  This morning, however, he started having diarrhea.  He has had 5 episodes of vomiting since this morning.  He denies abdominal pain, chest pain, SOB, dizziness, palpitations, fever/chills, sick contacts.      Diahrea resolved. Stool studies were negative. Will dc home on no abx.      Consults:          Colitis     - WBC 17.23.  Patient is afebrile.  - Cultures, stool studies ordered.  - Clear liquid diet, advance as tolerated.                     Final Active Diagnoses:     Diagnosis Date Noted POA    PRINCIPAL PROBLEM:  Low phosphate levels [E83.39] 12/06/2017 Yes    Colitis [K52.9] 12/06/2017 Yes     CKD (chronic kidney disease) stage 3, GFR 30-59 ml/min [N18.3] 05/12/2017 Yes       Chronic    Coronary artery disease involving native coronary artery of native heart without angina pectoris [I25.10] 05/10/2017 Yes       Chronic    Benign essential HTN [I10] 05/10/2017 Yes       Chronic       Problems Resolved During this Admission:     Diagnosis Date Noted Date Resolved POA         Discharged Condition: good     Disposition: Home or Self Care     Follow Up:      Follow-up Information      RIVAS aFlk On 12/14/2017.    Specialty:  Internal Medicine  Why:  @ 4:00  Contact information:  [id number]  [facility] [address]  [phone]                      Patient Instructions:   No discharge procedures on file.     Significant Diagnostic Studies: Labs:   BMP:   Recent Labs  Lab 12/07/17  0346   GLU 88      K 4.1      CO2 21*   BUN 23   CREATININE 2.0*   CALCIUM 6.7*   MG 1.6         Pending Diagnostic Studies:      None          Medications:  Reconciled Home Medications:       Discharge Medication List as of 12/7/2017  7:53 AM           CONTINUE these medications which have NOT CHANGED     Details   amlodipine (NORVASC) 10 MG tablet Take 1 tablet (10 mg total) by mouth once daily., Starting 5/10/2017, Until Thu 5/10/18, Normal       aspirin 81 MG Chew Take 81 mg by mouth once daily., Until Discontinued, Historical Med       benazepril (LOTENSIN) 20 MG tablet Take 1 tablet (20 mg total) by mouth once daily., Starting 5/10/2017, Until Thu 5/10/18, Normal       clopidogrel (PLAVIX) 75 mg tablet Take 1 tablet (75 mg total) by mouth once daily., Starting 5/10/2017, Until Thu 5/10/18, Normal       docusate sodium (COLACE) 100 MG capsule Take 100 mg by mouth 2 (two) times daily., Historical Med       ERGOCALCIFEROL, VITAMIN D2, (VITAMIN D ORAL) Take 50,000 Int'l Units by mouth. Every other week, Historical Med       metoprolol succinate (TOPROL-XL) 50 MG 24 hr tablet Take 1 tablet (50  "mg total) by mouth once daily., Starting 5/10/2017, Until u 5/10/18, Normal       omeprazole (PRILOSEC) 20 MG capsule TAKE 1 CAPSULE BY MOUTH ONCE DAILY AT 6 AM., Normal       pravastatin (PRAVACHOL) 40 MG tablet Take 1 tablet (40 mg total) by mouth once daily., Starting 5/10/2017, Until Thu 5/10/18, Normal       tamsulosin (FLOMAX) 0.4 mg Cp24 Take 1 capsule (0.4 mg total) by mouth once daily., Starting 5/10/2017, Until Thu 5/10/18, Normal       zolpidem (AMBIEN CR) 6.25 MG CR tablet TAKE 1 TABLET(6.25 MG) BY MOUTH EVERY NIGHT AS NEEDED FOR INSOMNIA, Normal                 Indwelling Lines/Drains at time of discharge:       Lines/Drains/Airways            No matching active lines, drains, or airways             Time spent on the discharge of patient: 30 minutes  Patient was seen and examined on the date of discharge and determined to be suitable for discharge.           Woody Muller MD  Department of Hospital Medicine  [facility]      Electronically signed by Woody Muller MD at 12/8/2017 12:27 PM        ED to Hosp-Admission (Discharged) on 12/5/2017            Routing History            Detailed Report           Note shared with patient       Today:   Mr. Lozada presents to  today for hospital follow up accompanied by his daughter, Clemencia. Presents with ongoing complaints of "some diarrhea at times, and hard at others, eating ok, passing gas".  Denies drinking alcohol or smoking  Does not endorse fever, chills, sob, chest pain, coughing, N/V (as presented with on admit), dark or bloody stools, or recent weight loss.   Dtr feels abd is "hard and swollen".     LBM: 12/20  He is currently on no antibiotics.       Review of Systems:  Eyes: denies visual changes at this time denies floaters   ENT: no nasal congestion or sore throat  Respiratory: no cough or shorness of breath  Cardiovascular: no chest pain or palpitations  Gastrointestinal: no nausea or vomiting, no abdominal pain or " change in bowel habits  Genitourinary: no hematuria or dysuria; denies frequency  Hematologic/Lymphatic: no easy bruising or lymphadenopathy  Musculoskeletal: no arthralgias or myalgias  Neurological: no seizures or tremors  Endocrine: no heat or cold intolerance      PAST HISTORY:     Past Medical History:   Diagnosis Date    Age-related osteoporosis without current pathological fracture 6/6/2017    Age-related osteoporosis without current pathological fracture 6/6/2017    Benign prostatic hyperplasia 05/10/2017    Cancer     throat    Cataract     CKD (chronic kidney disease) stage 3, GFR 30-59 ml/min 5/12/2017    Constipation 5/10/2017    Coronary artery disease involving native coronary artery of native heart without angina pectoris 05/10/2017    cards/Dr. Melva Reilly    H/O heart artery stent     LAD 9/22/16 Zotarolimus eluting coronary stent system- [facility]    History of throat cancer 1997    s/p surgery, XRT    Hypertension     Insomnia     Primary osteoarthritis of right knee 05/10/2017    sees ortho, knee replacement recommended       Past Surgical History:   Procedure Laterality Date    CATARACT EXTRACTION Right     july 2017    CORONARY ANGIOPLASTY WITH STENT PLACEMENT      LCx 1999, mid RCA 2010, LAD 9/2016; cards/Dr. Melva Reilly    THROAT SURGERY      2/2 throat cancer       Family History   Problem Relation Age of Onset    Cancer Mother      stomach    Hypertension Mother     Hypertension Daughter     Hypertension Son     Hypertension Daughter     Blindness Neg Hx     Amblyopia Neg Hx     Cataracts Neg Hx     Glaucoma Neg Hx     Macular degeneration Neg Hx     Retinal detachment Neg Hx     Strabismus Neg Hx     Diabetes Neg Hx     Stroke Neg Hx     Thyroid disease Neg Hx        Social History     Social History    Marital status:      Spouse name: N/A    Number of children: N/A    Years of education: N/A     Social History Main Topics    Smoking status:  Former Smoker     Years: 50.00     Types: Cigarettes    Smokeless tobacco: Never Used    Alcohol use No    Drug use: No    Sexual activity: No     Other Topics Concern    Not on file     Social History Narrative        Moved from [place] 2016    3 children: Daughter Clemencia    No regular exercise, ambulates with cane 2/2 R knee OA    Reports pneumococcal done after 66       MEDICATIONS & ALLERGIES:     Current Outpatient Prescriptions on File Prior to Visit   Medication Sig Dispense Refill    amlodipine (NORVASC) 10 MG tablet Take 1 tablet (10 mg total) by mouth once daily. 90 tablet 3    aspirin 81 MG Chew Take 81 mg by mouth once daily.      benazepril (LOTENSIN) 20 MG tablet Take 1 tablet (20 mg total) by mouth once daily. 90 tablet 3    clopidogrel (PLAVIX) 75 mg tablet Take 1 tablet (75 mg total) by mouth once daily. 90 tablet 3    docusate sodium (COLACE) 100 MG capsule Take 100 mg by mouth 2 (two) times daily.      ERGOCALCIFEROL, VITAMIN D2, (VITAMIN D ORAL) Take 50,000 Int'l Units by mouth. Every other week      metoprolol succinate (TOPROL-XL) 50 MG 24 hr tablet Take 1 tablet (50 mg total) by mouth once daily. 90 tablet 3    omeprazole (PRILOSEC) 20 MG capsule TAKE 1 CAPSULE BY MOUTH ONCE DAILY AT 6 AM. 90 capsule 2    pravastatin (PRAVACHOL) 40 MG tablet Take 1 tablet (40 mg total) by mouth once daily. 90 tablet 3    tamsulosin (FLOMAX) 0.4 mg Cp24 Take 1 capsule (0.4 mg total) by mouth once daily. 90 capsule 3    zolpidem (AMBIEN CR) 6.25 MG CR tablet TAKE 1 TABLET(6.25 MG) BY MOUTH EVERY NIGHT AS NEEDED FOR INSOMNIA 30 tablet 5     No current facility-administered medications on file prior to visit.         Review of patient's allergies indicates:  No Known Allergies    Medications Reconciliation:   I have reconciled the patient's home medications and discharge medications with the patient/family. I have updated all changes.  Refer to After-Visit Medication List.    OBJECTIVE:      Vital Signs:  There were no vitals filed for this visit.  Wt Readings from Last 1 Encounters:   12/07/17 0100 67 kg (147 lb 11.3 oz)     There is no height or weight on file to calculate BMI.     Wt Readings from Last 3 Encounters:   12/21/17 68.6 kg (151 lb 3.8 oz)   12/07/17 67 kg (147 lb 11.3 oz)   10/19/17 70.9 kg (156 lb 4.9 oz)     Temp Readings from Last 3 Encounters:   12/07/17 98.3 °F (36.8 °C) (Oral)   10/05/17 97.7 °F (36.5 °C) (Oral)   09/18/17 98.6 °F (37 °C) (Oral)     BP Readings from Last 3 Encounters:   12/21/17 122/64   12/07/17 123/66   10/05/17 128/69     Pulse Readings from Last 3 Encounters:   12/21/17 68   12/07/17 76   10/05/17 67       Physical Exam:  General: Well developed, well nourished. No distress.  HEENT: Head is normocephalic, atraumatic; ears are normal.   Eyes: Clear conjunctiva.  Neck: Supple, symmetrical neck; trachea midline.  Lungs: Clear to auscultation bilaterally and normal respiratory effort.  Cardiovascular: Heart with regular rate and rhythm. No murmurs, gallops or rubs  Extremities: No LE edema. Pulses 2+ and symmetric.   Abdomen: Abdomen is mild distention, + palpable induced pain to LLQ, no rebound or guarding;  normal bowel sounds.  Skin: Skin color, texture, turgor normal. No rashes.  Musculoskeletal: Normal gait.   Lymph Nodes: No cervical or supraclavicular adenopathy.  Neurologic: No focal numbness or weakness.   Psychiatric: Not depressed.      Laboratory:   Lab Results   Component Value Date    WBC 11.29 12/07/2017    HGB 11.5 (L) 12/07/2017    HCT 37.4 (L) 12/07/2017     (H) 12/07/2017    CHOL 132 05/10/2017    TRIG 128 05/10/2017    HDL 37 (L) 05/10/2017    ALT 8 (L) 12/05/2017    AST 17 12/05/2017     12/07/2017    K 4.1 12/07/2017     12/07/2017    CREATININE 2.0 (H) 12/07/2017    BUN 23 12/07/2017    CO2 21 (L) 12/07/2017    TSH 1.543 10/03/2017    HGBA1C 5.5 12/07/2017         Diagnostic Results:      Abdomen, 2  "views.    Indication:.    Findings:    No free air.    The bowel gas pattern is within normal limits. No abnormal air-fluid levels identified.  .    No abnormal calcifications overlying the renal collecting systems.     The bones are intact.   Impression              Unremarkable bowel gas pattern.      Electronically signed by: OMID JOHNSON MD  Date: 12/05/17           ASSESSMENT & PLAN:     HIGH RISK CONDITION(S):    Clemencia (dtr)  [phone] (ext: [id number])      Recent admission for ? Colitis, in the setting of chronic constipation, and history of GIB and Gastric Ulcers:   *History of Cholelithiasis (5/12/2017 US)  (? Resolution; discharged off abxs and still with some abd discomforts)  *Treated with Flagyl and Cipro while IP (did not discharge on abxs: ??)   *12/7: C Diff: negative  *12/7: Leukocytosis: 17 K on admit and 11.2 at discharge  *12/5 Abd Film: negative  - will commence to Cipro and Flagyl therapy for tx of Colitis and have him follow up with me in 2 weeks  - check CMP (surveillance potassium and phos levels), CBC today   - check Flat and Erect of ABD today  - amb ref to GI (apt: 1/10)      Hypertension:   Per JNC 8, goal < 150/90  Today:122/64 (normotensive)   - continue Norvasc  - continue Lotensin  - continue Toprol XL      BPH:  (sxs controlled)  - continue Flomax      History of CAD, MI and Coronary Stent Placement (x2)??:  (stable and controlled)  - continue ASA   - continue Plavix   *Per the patient and his dtr, was established with Cardiologist, Dr. BALTAZAR Reilly, but insurance changed and "Not accepted", and not being followed  - amb ref to Cardiology (apt: 1/3)       CKD III:   Baseline: 1.8-2.0  Discharge: 2.0  *Per the patient and his dtr, was established with Nephrologist in [place], but moved here and not being followed  - apt with [facility] (1/3)       History of Chronic OA and Vitamin D Deficiency:   - check Vitamin D level today with labs      Chronic Insomnia:   Ambien " (defer prescribing to his PCP)      Immunizations:   Per the daughter, the following are up to date:   (Unable to locate in LINKS)  Flu Vaccine: 2017  Pneumonia Vaccines  Zoster  Tdap      [facility] Clinic Visit (Post Discharge Follow-up) Today:   - Our clinic physicians and nurses plan to follow the patient up for any medical issues in the [facility] for 30 days post discharge.  Future Appointments  Date Time Provider Department Center   12/21/2017 9:15 AM [facility] [id number] 485 LB LIMIT [facility] XRAY IM [facility]   1/3/2018 8:00 AM Liberty Morgan MD [facility] CARDIO [facility]   1/3/2018 9:20 AM Osmani Barrientos MD [facility] NEPHRO [place]   1/10/2018 9:00 AM Lianna Peters NP [facility] GASTRO [facility]   1/12/2018 10:00 AM Liberty Berger MD [facility]          Medication List          Accurate as of 12/21/17  9:14 AM. If you have any questions, ask your nurse or doctor.               START taking these medications    polyethylene glycol 17 gram/dose powder  Commonly known as:  GLYCOLAX  Take 17 g by mouth once daily.  Started by:  RIVAS Falk        CONTINUE taking these medications    amLODIPine 10 MG tablet  Commonly known as:  NORVASC  Take 1 tablet (10 mg total) by mouth once daily.     aspirin 81 MG Chew     benazepril 20 MG tablet  Commonly known as:  LOTENSIN  Take 1 tablet (20 mg total) by mouth once daily.     clopidogrel 75 mg tablet  Commonly known as:  PLAVIX  Take 1 tablet (75 mg total) by mouth once daily.     metoprolol succinate 50 MG 24 hr tablet  Commonly known as:  TOPROL-XL  Take 1 tablet (50 mg total) by mouth once daily.     omeprazole 20 MG capsule  Commonly known as:  PRILOSEC  TAKE 1 CAPSULE BY MOUTH ONCE DAILY AT 6 AM.     pravastatin 40 MG tablet  Commonly known as:  PRAVACHOL  Take 1 tablet (40 mg total) by mouth once daily.     tamsulosin 0.4 mg Cp24  Commonly known as:  FLOMAX  Take 1 capsule (0.4 mg total) by mouth once daily.     VITAMIN D ORAL     zolpidem 6.25  MG CR tablet  Commonly known as:  AMBIEN CR  TAKE 1 TABLET(6.25 MG) BY MOUTH EVERY NIGHT AS NEEDED FOR INSOMNIA        STOP taking these medications    docusate sodium 100 MG capsule  Commonly known as:  COLACE  Stopped by:  RIVAS Falk           Where to Get Your Medications      [facility] [address] [place] [id number]    Phone:  [phone]   · polyethylene glycol 17 gram/dose powder         Signing Physician:  RIVAS Falk

## 2017-12-21 NOTE — TELEPHONE ENCOUNTER
BMP  Lab Results   Component Value Date     2017     2017    K 4.5 2017    K 4.5 2017     2017     2017    CO2 21 (L) 2017    CO2 21 (L) 2017    BUN 16 2017    BUN 16 2017    CREATININE 1.8 (H) 2017    CREATININE 1.8 (H) 2017    CALCIUM 8.7 2017    CALCIUM 8.7 2017    ANIONGAP 8 2017    ANIONGAP 8 2017    ESTGFRAFRICA 40 (A) 2017    ESTGFRAFRICA 40 (A) 2017    EGFRNONAA 35 (A) 2017    EGFRNONAA 35 (A) 2017     CBC  Lab Results   Component Value Date    WBC 11.94 (<<11.29) 2017    HGB 13.2 (L) 2017    HCT 41.6 2017    MCV 77 (L) 2017     (H) 2017     M.9    Vitamin D: 22:  - Resume repletion  (Vitamin D 1000 units per day)     Phos: 2.2  - Replete   (Neutra Phos)     Flat and Erect: negative    Plan:   Incomplete / Partial Treatment for Colitis (rec'd abxs x 2 days during admission; based on exam,  symptomatology, and history, will have him seen by GI and will tx with Cipro and Flagyl):  Cipro 500 bid x 10 days  Flagyl 500 mg q 8 hours x 10 days    Have shared these results with his daughter.     LOGAN

## 2017-12-21 NOTE — PATIENT INSTRUCTIONS
1) Will contact you with results of labs and Xray of abdomen done in clinic today.     [facility] Clinic Visit (Post Discharge Follow-up) Today:   - Our clinic physicians and nurses plan to follow the patient up for any medical issues in the [facility] for 30 days post discharge.      Future Appointments  Date Time Provider Department Center   12/21/2017 9:15 AM [facility] [id number] 485 LB LIMIT [facility] XRAY IM [facility]   1/3/2018 8:00 AM [facility]   1/3/2018 9:20 AM Osmani Barrientos MD [facility] NEPHRO [place]   1/10/2018 9:00 AM Lianna Peters NP [facility] GASTRO [facility]   1/12/2018 10:00 AM Liberty Berger MD [facility]

## 2017-12-21 NOTE — Clinical Note
[facility] Clinic Visit (Post Discharge Follow-up) Today:  - Our clinic physicians and nurses plan to follow the patient up for any medical issues in the [facility] for 30 days post discharge.  Future Appointments: 1/3/2018   8:00 AM    Liberty Morgan MD      [facility] CARDIO    [facility] 1/3/2018   9:20 AM    Osmani Barrientos MD        [facility] NEPHRO    [place] 1/10/2018  9:00 AM    Lianna Peters NP    [facility] GASTRO    [facility] 1/12/2018  10:00 AM   Liberty Berger MD       [facility]

## 2018-01-01 ENCOUNTER — INFUSION (OUTPATIENT)
Dept: INFUSION THERAPY | Facility: OTHER | Age: 83
End: 2018-01-01
Attending: INTERNAL MEDICINE
Payer: MEDICARE

## 2018-01-01 ENCOUNTER — TELEPHONE (OUTPATIENT)
Dept: HEMATOLOGY/ONCOLOGY | Facility: CLINIC | Age: 83
End: 2018-01-01

## 2018-01-01 ENCOUNTER — INITIAL CONSULT (OUTPATIENT)
Dept: HEMATOLOGY/ONCOLOGY | Facility: CLINIC | Age: 83
End: 2018-01-01
Payer: MEDICARE

## 2018-01-01 ENCOUNTER — ANESTHESIA EVENT (OUTPATIENT)
Dept: ENDOSCOPY | Facility: HOSPITAL | Age: 83
End: 2018-01-01
Payer: MEDICARE

## 2018-01-01 ENCOUNTER — TELEPHONE (OUTPATIENT)
Dept: FAMILY MEDICINE | Facility: CLINIC | Age: 83
End: 2018-01-01

## 2018-01-01 ENCOUNTER — HOSPITAL ENCOUNTER (OUTPATIENT)
Facility: HOSPITAL | Age: 83
Discharge: HOME | End: 2018-03-23
Attending: INTERNAL MEDICINE | Admitting: INTERNAL MEDICINE
Payer: MEDICARE

## 2018-01-01 ENCOUNTER — ANESTHESIA (OUTPATIENT)
Dept: ENDOSCOPY | Facility: OTHER | Age: 83
DRG: 392 | End: 2018-01-01
Payer: MEDICARE

## 2018-01-01 ENCOUNTER — OFFICE VISIT (OUTPATIENT)
Dept: HEMATOLOGY/ONCOLOGY | Facility: CLINIC | Age: 83
End: 2018-01-01
Payer: MEDICARE

## 2018-01-01 ENCOUNTER — OFFICE VISIT (OUTPATIENT)
Dept: CARDIOLOGY | Facility: CLINIC | Age: 83
End: 2018-01-01
Payer: MEDICARE

## 2018-01-01 ENCOUNTER — SURGERY (OUTPATIENT)
Age: 83
End: 2018-01-01

## 2018-01-01 ENCOUNTER — HOSPITAL ENCOUNTER (OUTPATIENT)
Facility: OTHER | Age: 83
Discharge: HOME | End: 2018-04-12
Attending: RADIOLOGY | Admitting: RADIOLOGY
Payer: MEDICARE

## 2018-01-01 ENCOUNTER — HOSPITAL ENCOUNTER (INPATIENT)
Facility: OTHER | Age: 83
LOS: 3 days | Discharge: HOME | DRG: 392 | End: 2018-03-16
Attending: EMERGENCY MEDICINE | Admitting: HOSPITALIST
Payer: MEDICARE

## 2018-01-01 ENCOUNTER — HOSPITAL ENCOUNTER (OUTPATIENT)
Dept: CARDIOLOGY | Facility: OTHER | Age: 83
Discharge: HOME | End: 2018-10-26
Attending: INTERNAL MEDICINE
Payer: MEDICARE

## 2018-01-01 ENCOUNTER — DOCUMENTATION ONLY (OUTPATIENT)
Dept: HEMATOLOGY/ONCOLOGY | Facility: CLINIC | Age: 83
End: 2018-01-01

## 2018-01-01 ENCOUNTER — CLINICAL SUPPORT (OUTPATIENT)
Dept: HEMATOLOGY/ONCOLOGY | Facility: CLINIC | Age: 83
End: 2018-01-01
Payer: MEDICARE

## 2018-01-01 ENCOUNTER — TELEPHONE (OUTPATIENT)
Dept: ENDOSCOPY | Facility: HOSPITAL | Age: 83
End: 2018-01-01

## 2018-01-01 ENCOUNTER — HOSPITAL ENCOUNTER (OUTPATIENT)
Dept: RADIOLOGY | Facility: OTHER | Age: 83
Discharge: HOME | End: 2018-04-27
Attending: INTERNAL MEDICINE
Payer: MEDICARE

## 2018-01-01 ENCOUNTER — PATIENT OUTREACH (OUTPATIENT)
Dept: ADMINISTRATIVE | Facility: CLINIC | Age: 83
End: 2018-01-01

## 2018-01-01 ENCOUNTER — HOSPITAL ENCOUNTER (OUTPATIENT)
Dept: RADIOLOGY | Facility: HOSPITAL | Age: 83
Discharge: HOME | End: 2018-09-07
Attending: INTERNAL MEDICINE
Payer: MEDICARE

## 2018-01-01 ENCOUNTER — LAB VISIT (OUTPATIENT)
Dept: LAB | Facility: OTHER | Age: 83
End: 2018-01-01
Attending: INTERNAL MEDICINE
Payer: MEDICARE

## 2018-01-01 ENCOUNTER — TELEPHONE (OUTPATIENT)
Dept: GASTROENTEROLOGY | Facility: CLINIC | Age: 83
End: 2018-01-01

## 2018-01-01 ENCOUNTER — PES CALL (OUTPATIENT)
Dept: ADMINISTRATIVE | Facility: CLINIC | Age: 83
End: 2018-01-01

## 2018-01-01 ENCOUNTER — HOSPITAL ENCOUNTER (OUTPATIENT)
Dept: RADIOLOGY | Facility: HOSPITAL | Age: 83
Discharge: HOME | End: 2018-11-08
Attending: INTERNAL MEDICINE
Payer: MEDICARE

## 2018-01-01 ENCOUNTER — HOSPITAL ENCOUNTER (OUTPATIENT)
Dept: CARDIOLOGY | Facility: OTHER | Age: 83
Discharge: HOME | End: 2018-10-04
Attending: INTERNAL MEDICINE
Payer: MEDICARE

## 2018-01-01 ENCOUNTER — ANESTHESIA EVENT (OUTPATIENT)
Dept: ENDOSCOPY | Facility: OTHER | Age: 83
DRG: 392 | End: 2018-01-01
Payer: MEDICARE

## 2018-01-01 ENCOUNTER — ANESTHESIA (OUTPATIENT)
Dept: ENDOSCOPY | Facility: HOSPITAL | Age: 83
End: 2018-01-01
Payer: MEDICARE

## 2018-01-01 ENCOUNTER — HOSPITAL ENCOUNTER (OUTPATIENT)
Dept: RADIOLOGY | Facility: HOSPITAL | Age: 83
Discharge: HOME | End: 2018-04-27
Attending: INTERNAL MEDICINE
Payer: MEDICARE

## 2018-01-01 ENCOUNTER — OFFICE VISIT (OUTPATIENT)
Dept: GASTROENTEROLOGY | Facility: CLINIC | Age: 83
End: 2018-01-01
Payer: MEDICARE

## 2018-01-01 ENCOUNTER — HOSPITAL ENCOUNTER (EMERGENCY)
Facility: OTHER | Age: 83
Discharge: HOME | End: 2018-05-28
Attending: EMERGENCY MEDICINE
Payer: MEDICARE

## 2018-01-01 ENCOUNTER — HOSPITAL ENCOUNTER (OUTPATIENT)
Dept: RADIOLOGY | Facility: HOSPITAL | Age: 83
Discharge: HOME | End: 2018-07-02
Attending: INTERNAL MEDICINE
Payer: MEDICARE

## 2018-01-01 ENCOUNTER — PATIENT MESSAGE (OUTPATIENT)
Dept: ENDOSCOPY | Facility: HOSPITAL | Age: 83
End: 2018-01-01

## 2018-01-01 ENCOUNTER — HOSPITAL ENCOUNTER (EMERGENCY)
Facility: OTHER | Age: 83
Discharge: HOME | End: 2018-08-20
Attending: EMERGENCY MEDICINE
Payer: MEDICARE

## 2018-01-01 ENCOUNTER — LAB VISIT (OUTPATIENT)
Dept: LAB | Facility: HOSPITAL | Age: 83
End: 2018-01-01
Attending: INTERNAL MEDICINE
Payer: MEDICARE

## 2018-01-01 ENCOUNTER — TELEPHONE (OUTPATIENT)
Dept: OPHTHALMOLOGY | Facility: CLINIC | Age: 83
End: 2018-01-01

## 2018-01-01 ENCOUNTER — OFFICE VISIT (OUTPATIENT)
Dept: NEPHROLOGY | Facility: CLINIC | Age: 83
End: 2018-01-01
Payer: MEDICARE

## 2018-01-01 VITALS
DIASTOLIC BLOOD PRESSURE: 80 MMHG | OXYGEN SATURATION: 98 % | HEART RATE: 115 BPM | RESPIRATION RATE: 18 BRPM | TEMPERATURE: 97 F | SYSTOLIC BLOOD PRESSURE: 135 MMHG

## 2018-01-01 VITALS
TEMPERATURE: 97 F | DIASTOLIC BLOOD PRESSURE: 69 MMHG | DIASTOLIC BLOOD PRESSURE: 66 MMHG | OXYGEN SATURATION: 98 % | WEIGHT: 125.88 LBS | OXYGEN SATURATION: 99 % | OXYGEN SATURATION: 100 % | BODY MASS INDEX: 21.34 KG/M2 | HEART RATE: 89 BPM | HEIGHT: 64 IN | RESPIRATION RATE: 17 BRPM | BODY MASS INDEX: 21.56 KG/M2 | WEIGHT: 126.31 LBS | RESPIRATION RATE: 16 BRPM | WEIGHT: 125 LBS | SYSTOLIC BLOOD PRESSURE: 107 MMHG | TEMPERATURE: 97 F | HEIGHT: 64 IN | WEIGHT: 128.31 LBS | HEART RATE: 81 BPM | HEART RATE: 87 BPM | TEMPERATURE: 97 F | HEIGHT: 64 IN | DIASTOLIC BLOOD PRESSURE: 77 MMHG | SYSTOLIC BLOOD PRESSURE: 143 MMHG | OXYGEN SATURATION: 100 % | BODY MASS INDEX: 21.79 KG/M2 | SYSTOLIC BLOOD PRESSURE: 139 MMHG | TEMPERATURE: 99 F | RESPIRATION RATE: 12 BRPM | TEMPERATURE: 98 F | OXYGEN SATURATION: 99 % | HEIGHT: 64 IN | WEIGHT: 127.63 LBS | SYSTOLIC BLOOD PRESSURE: 155 MMHG | SYSTOLIC BLOOD PRESSURE: 142 MMHG | RESPIRATION RATE: 17 BRPM | OXYGEN SATURATION: 99 % | HEART RATE: 89 BPM | SYSTOLIC BLOOD PRESSURE: 134 MMHG | TEMPERATURE: 97 F | WEIGHT: 127.19 LBS | BODY MASS INDEX: 21.91 KG/M2 | DIASTOLIC BLOOD PRESSURE: 58 MMHG | HEART RATE: 91 BPM | DIASTOLIC BLOOD PRESSURE: 58 MMHG | BODY MASS INDEX: 21.71 KG/M2 | HEIGHT: 64 IN | HEIGHT: 64 IN | TEMPERATURE: 97 F | OXYGEN SATURATION: 99 % | RESPIRATION RATE: 17 BRPM | RESPIRATION RATE: 17 BRPM | WEIGHT: 128.5 LBS | SYSTOLIC BLOOD PRESSURE: 132 MMHG | BODY MASS INDEX: 21.94 KG/M2 | DIASTOLIC BLOOD PRESSURE: 70 MMHG | HEART RATE: 92 BPM | HEART RATE: 109 BPM | RESPIRATION RATE: 16 BRPM | HEIGHT: 64 IN | BODY MASS INDEX: 21.49 KG/M2 | DIASTOLIC BLOOD PRESSURE: 65 MMHG

## 2018-01-01 VITALS
SYSTOLIC BLOOD PRESSURE: 157 MMHG | RESPIRATION RATE: 18 BRPM | HEIGHT: 64 IN | OXYGEN SATURATION: 100 % | HEART RATE: 88 BPM | WEIGHT: 121.69 LBS | DIASTOLIC BLOOD PRESSURE: 68 MMHG | TEMPERATURE: 97 F | BODY MASS INDEX: 20.78 KG/M2

## 2018-01-01 VITALS
OXYGEN SATURATION: 99 % | BODY MASS INDEX: 24.01 KG/M2 | HEIGHT: 64 IN | HEART RATE: 66 BPM | DIASTOLIC BLOOD PRESSURE: 60 MMHG | WEIGHT: 140.63 LBS | SYSTOLIC BLOOD PRESSURE: 80 MMHG

## 2018-01-01 VITALS
RESPIRATION RATE: 16 BRPM | HEIGHT: 64 IN | WEIGHT: 132.69 LBS | BODY MASS INDEX: 22.65 KG/M2 | DIASTOLIC BLOOD PRESSURE: 57 MMHG | HEART RATE: 80 BPM | OXYGEN SATURATION: 99 % | TEMPERATURE: 98 F | SYSTOLIC BLOOD PRESSURE: 101 MMHG

## 2018-01-01 VITALS
DIASTOLIC BLOOD PRESSURE: 74 MMHG | WEIGHT: 125 LBS | HEIGHT: 64 IN | BODY MASS INDEX: 21.34 KG/M2 | SYSTOLIC BLOOD PRESSURE: 165 MMHG | TEMPERATURE: 98 F | HEART RATE: 80 BPM | OXYGEN SATURATION: 99 % | RESPIRATION RATE: 15 BRPM

## 2018-01-01 VITALS
SYSTOLIC BLOOD PRESSURE: 140 MMHG | OXYGEN SATURATION: 99 % | TEMPERATURE: 98 F | BODY MASS INDEX: 21.56 KG/M2 | TEMPERATURE: 97 F | RESPIRATION RATE: 18 BRPM | BODY MASS INDEX: 22.06 KG/M2 | SYSTOLIC BLOOD PRESSURE: 126 MMHG | RESPIRATION RATE: 17 BRPM | WEIGHT: 129.19 LBS | OXYGEN SATURATION: 97 % | DIASTOLIC BLOOD PRESSURE: 70 MMHG | HEART RATE: 95 BPM | DIASTOLIC BLOOD PRESSURE: 60 MMHG | OXYGEN SATURATION: 98 % | HEART RATE: 114 BPM | WEIGHT: 126.31 LBS | TEMPERATURE: 98 F | HEART RATE: 109 BPM | DIASTOLIC BLOOD PRESSURE: 64 MMHG | HEIGHT: 64 IN | HEIGHT: 64 IN | WEIGHT: 133.38 LBS | RESPIRATION RATE: 18 BRPM | HEIGHT: 64 IN | SYSTOLIC BLOOD PRESSURE: 121 MMHG | BODY MASS INDEX: 22.77 KG/M2

## 2018-01-01 VITALS
SYSTOLIC BLOOD PRESSURE: 131 MMHG | OXYGEN SATURATION: 100 % | WEIGHT: 127.44 LBS | HEART RATE: 119 BPM | BODY MASS INDEX: 21.76 KG/M2 | DIASTOLIC BLOOD PRESSURE: 60 MMHG | HEIGHT: 64 IN | HEART RATE: 97 BPM | HEIGHT: 64 IN | RESPIRATION RATE: 17 BRPM | DIASTOLIC BLOOD PRESSURE: 65 MMHG | TEMPERATURE: 97 F | BODY MASS INDEX: 21.42 KG/M2 | WEIGHT: 125.44 LBS | TEMPERATURE: 97 F | OXYGEN SATURATION: 99 % | RESPIRATION RATE: 18 BRPM | SYSTOLIC BLOOD PRESSURE: 137 MMHG

## 2018-01-01 VITALS
WEIGHT: 149 LBS | SYSTOLIC BLOOD PRESSURE: 110 MMHG | DIASTOLIC BLOOD PRESSURE: 66 MMHG | TEMPERATURE: 99 F | HEART RATE: 67 BPM | BODY MASS INDEX: 25.44 KG/M2 | HEIGHT: 64 IN | RESPIRATION RATE: 16 BRPM | OXYGEN SATURATION: 97 %

## 2018-01-01 VITALS
SYSTOLIC BLOOD PRESSURE: 121 MMHG | HEART RATE: 101 BPM | HEIGHT: 64 IN | OXYGEN SATURATION: 100 % | RESPIRATION RATE: 18 BRPM | DIASTOLIC BLOOD PRESSURE: 68 MMHG | WEIGHT: 126.13 LBS | BODY MASS INDEX: 21.53 KG/M2 | TEMPERATURE: 98 F

## 2018-01-01 VITALS
WEIGHT: 123.44 LBS | OXYGEN SATURATION: 99 % | RESPIRATION RATE: 17 BRPM | BODY MASS INDEX: 20.92 KG/M2 | HEART RATE: 108 BPM | DIASTOLIC BLOOD PRESSURE: 66 MMHG | TEMPERATURE: 98 F | DIASTOLIC BLOOD PRESSURE: 81 MMHG | WEIGHT: 122.56 LBS | RESPIRATION RATE: 16 BRPM | DIASTOLIC BLOOD PRESSURE: 68 MMHG | HEART RATE: 112 BPM | HEIGHT: 64 IN | HEART RATE: 101 BPM | OXYGEN SATURATION: 99 % | BODY MASS INDEX: 20.58 KG/M2 | SYSTOLIC BLOOD PRESSURE: 132 MMHG | WEIGHT: 120.56 LBS | SYSTOLIC BLOOD PRESSURE: 136 MMHG | TEMPERATURE: 98 F | HEIGHT: 64 IN | SYSTOLIC BLOOD PRESSURE: 134 MMHG | BODY MASS INDEX: 21.07 KG/M2 | HEIGHT: 64 IN | TEMPERATURE: 98 F | RESPIRATION RATE: 17 BRPM | OXYGEN SATURATION: 100 %

## 2018-01-01 VITALS
HEART RATE: 70 BPM | TEMPERATURE: 98 F | HEART RATE: 85 BPM | OXYGEN SATURATION: 98 % | WEIGHT: 130 LBS | WEIGHT: 132.5 LBS | HEIGHT: 64 IN | DIASTOLIC BLOOD PRESSURE: 71 MMHG | SYSTOLIC BLOOD PRESSURE: 165 MMHG | BODY MASS INDEX: 22.62 KG/M2 | OXYGEN SATURATION: 98 % | DIASTOLIC BLOOD PRESSURE: 75 MMHG | TEMPERATURE: 97 F | RESPIRATION RATE: 17 BRPM | SYSTOLIC BLOOD PRESSURE: 121 MMHG | HEIGHT: 64 IN | BODY MASS INDEX: 22.2 KG/M2 | RESPIRATION RATE: 18 BRPM

## 2018-01-01 VITALS
WEIGHT: 130.5 LBS | TEMPERATURE: 98 F | HEART RATE: 76 BPM | OXYGEN SATURATION: 100 % | HEIGHT: 64 IN | SYSTOLIC BLOOD PRESSURE: 155 MMHG | DIASTOLIC BLOOD PRESSURE: 79 MMHG | BODY MASS INDEX: 22.28 KG/M2 | RESPIRATION RATE: 16 BRPM

## 2018-01-01 VITALS — HEART RATE: 63 BPM | DIASTOLIC BLOOD PRESSURE: 66 MMHG | SYSTOLIC BLOOD PRESSURE: 142 MMHG

## 2018-01-01 VITALS
OXYGEN SATURATION: 98 % | RESPIRATION RATE: 18 BRPM | HEIGHT: 64 IN | WEIGHT: 135.13 LBS | HEART RATE: 120 BPM | DIASTOLIC BLOOD PRESSURE: 64 MMHG | TEMPERATURE: 97 F | SYSTOLIC BLOOD PRESSURE: 130 MMHG | BODY MASS INDEX: 23.07 KG/M2

## 2018-01-01 VITALS
SYSTOLIC BLOOD PRESSURE: 123 MMHG | HEIGHT: 64 IN | RESPIRATION RATE: 18 BRPM | OXYGEN SATURATION: 100 % | WEIGHT: 137.81 LBS | DIASTOLIC BLOOD PRESSURE: 62 MMHG | BODY MASS INDEX: 23.53 KG/M2 | HEART RATE: 72 BPM | TEMPERATURE: 98 F

## 2018-01-01 VITALS
DIASTOLIC BLOOD PRESSURE: 61 MMHG | TEMPERATURE: 97 F | HEART RATE: 105 BPM | BODY MASS INDEX: 22.4 KG/M2 | BODY MASS INDEX: 21.68 KG/M2 | HEART RATE: 91 BPM | HEIGHT: 64 IN | DIASTOLIC BLOOD PRESSURE: 50 MMHG | HEIGHT: 64 IN | TEMPERATURE: 97 F | TEMPERATURE: 98 F | BODY MASS INDEX: 21.91 KG/M2 | SYSTOLIC BLOOD PRESSURE: 105 MMHG | SYSTOLIC BLOOD PRESSURE: 147 MMHG | TEMPERATURE: 99 F | OXYGEN SATURATION: 100 % | OXYGEN SATURATION: 97 % | RESPIRATION RATE: 18 BRPM | WEIGHT: 129.44 LBS | RESPIRATION RATE: 17 BRPM | OXYGEN SATURATION: 98 % | SYSTOLIC BLOOD PRESSURE: 112 MMHG | HEIGHT: 64 IN | WEIGHT: 128.31 LBS | OXYGEN SATURATION: 99 % | DIASTOLIC BLOOD PRESSURE: 60 MMHG | WEIGHT: 131.19 LBS | HEIGHT: 64 IN | DIASTOLIC BLOOD PRESSURE: 71 MMHG | HEART RATE: 90 BPM | HEART RATE: 84 BPM | WEIGHT: 127 LBS | BODY MASS INDEX: 22.1 KG/M2 | RESPIRATION RATE: 17 BRPM | SYSTOLIC BLOOD PRESSURE: 129 MMHG

## 2018-01-01 VITALS
RESPIRATION RATE: 17 BRPM | HEIGHT: 64 IN | WEIGHT: 130.5 LBS | TEMPERATURE: 97 F | OXYGEN SATURATION: 99 % | DIASTOLIC BLOOD PRESSURE: 88 MMHG | TEMPERATURE: 97 F | BODY MASS INDEX: 22.5 KG/M2 | RESPIRATION RATE: 17 BRPM | HEIGHT: 64 IN | WEIGHT: 131.81 LBS | HEART RATE: 95 BPM | HEART RATE: 92 BPM | SYSTOLIC BLOOD PRESSURE: 159 MMHG | DIASTOLIC BLOOD PRESSURE: 80 MMHG | SYSTOLIC BLOOD PRESSURE: 168 MMHG | OXYGEN SATURATION: 98 % | BODY MASS INDEX: 22.28 KG/M2

## 2018-01-01 VITALS
DIASTOLIC BLOOD PRESSURE: 74 MMHG | HEIGHT: 64 IN | BODY MASS INDEX: 23.53 KG/M2 | BODY MASS INDEX: 23.45 KG/M2 | WEIGHT: 137.81 LBS | WEIGHT: 137.38 LBS | HEIGHT: 64 IN | HEART RATE: 77 BPM | SYSTOLIC BLOOD PRESSURE: 136 MMHG

## 2018-01-01 VITALS
WEIGHT: 133.63 LBS | OXYGEN SATURATION: 98 % | WEIGHT: 132.25 LBS | DIASTOLIC BLOOD PRESSURE: 84 MMHG | HEIGHT: 64 IN | TEMPERATURE: 97 F | SYSTOLIC BLOOD PRESSURE: 152 MMHG | BODY MASS INDEX: 22.81 KG/M2 | OXYGEN SATURATION: 97 % | RESPIRATION RATE: 18 BRPM | HEART RATE: 103 BPM | SYSTOLIC BLOOD PRESSURE: 160 MMHG | HEART RATE: 85 BPM | DIASTOLIC BLOOD PRESSURE: 79 MMHG | TEMPERATURE: 99 F | HEIGHT: 64 IN | RESPIRATION RATE: 16 BRPM | BODY MASS INDEX: 22.58 KG/M2

## 2018-01-01 VITALS — SYSTOLIC BLOOD PRESSURE: 180 MMHG | DIASTOLIC BLOOD PRESSURE: 100 MMHG

## 2018-01-01 VITALS
OXYGEN SATURATION: 98 % | BODY MASS INDEX: 23.29 KG/M2 | SYSTOLIC BLOOD PRESSURE: 98 MMHG | HEIGHT: 64 IN | HEART RATE: 86 BPM | DIASTOLIC BLOOD PRESSURE: 56 MMHG | TEMPERATURE: 99 F | RESPIRATION RATE: 12 BRPM | WEIGHT: 136.44 LBS

## 2018-01-01 VITALS
TEMPERATURE: 98 F | HEART RATE: 126 BPM | HEART RATE: 99 BPM | DIASTOLIC BLOOD PRESSURE: 60 MMHG | RESPIRATION RATE: 17 BRPM | SYSTOLIC BLOOD PRESSURE: 126 MMHG | WEIGHT: 126.56 LBS | RESPIRATION RATE: 17 BRPM | DIASTOLIC BLOOD PRESSURE: 61 MMHG | HEIGHT: 64 IN | SYSTOLIC BLOOD PRESSURE: 134 MMHG | SYSTOLIC BLOOD PRESSURE: 124 MMHG | OXYGEN SATURATION: 99 % | TEMPERATURE: 96 F | BODY MASS INDEX: 21.61 KG/M2 | DIASTOLIC BLOOD PRESSURE: 58 MMHG | OXYGEN SATURATION: 100 %

## 2018-01-01 VITALS
SYSTOLIC BLOOD PRESSURE: 101 MMHG | DIASTOLIC BLOOD PRESSURE: 69 MMHG | BODY MASS INDEX: 24.69 KG/M2 | HEART RATE: 81 BPM | HEIGHT: 64 IN | WEIGHT: 144.63 LBS

## 2018-01-01 VITALS
WEIGHT: 134.69 LBS | TEMPERATURE: 99 F | HEIGHT: 64 IN | OXYGEN SATURATION: 99 % | HEART RATE: 73 BPM | RESPIRATION RATE: 18 BRPM | DIASTOLIC BLOOD PRESSURE: 70 MMHG | BODY MASS INDEX: 22.99 KG/M2 | SYSTOLIC BLOOD PRESSURE: 151 MMHG

## 2018-01-01 VITALS
HEIGHT: 64 IN | SYSTOLIC BLOOD PRESSURE: 168 MMHG | OXYGEN SATURATION: 97 % | WEIGHT: 148.5 LBS | SYSTOLIC BLOOD PRESSURE: 133 MMHG | HEART RATE: 73 BPM | RESPIRATION RATE: 16 BRPM | TEMPERATURE: 98 F | BODY MASS INDEX: 25.35 KG/M2 | HEART RATE: 75 BPM | OXYGEN SATURATION: 99 % | DIASTOLIC BLOOD PRESSURE: 76 MMHG | TEMPERATURE: 99 F | DIASTOLIC BLOOD PRESSURE: 64 MMHG | RESPIRATION RATE: 16 BRPM

## 2018-01-01 DIAGNOSIS — Z85.01 HISTORY OF ESOPHAGEAL CANCER: ICD-10-CM

## 2018-01-01 DIAGNOSIS — C79.9 MULTIPLE LESIONS OF METASTATIC MALIGNANCY: ICD-10-CM

## 2018-01-01 DIAGNOSIS — C34.92 ADENOCARCINOMA OF LUNG, STAGE 4, LEFT: Primary | ICD-10-CM

## 2018-01-01 DIAGNOSIS — R53.1 WEAKNESS: Primary | ICD-10-CM

## 2018-01-01 DIAGNOSIS — R91.8 PULMONARY NODULES: ICD-10-CM

## 2018-01-01 DIAGNOSIS — N18.30 CKD (CHRONIC KIDNEY DISEASE) STAGE 3, GFR 30-59 ML/MIN: ICD-10-CM

## 2018-01-01 DIAGNOSIS — E44.0 MALNUTRITION OF MODERATE DEGREE: ICD-10-CM

## 2018-01-01 DIAGNOSIS — K52.9 COLITIS: ICD-10-CM

## 2018-01-01 DIAGNOSIS — I10 BENIGN ESSENTIAL HTN: ICD-10-CM

## 2018-01-01 DIAGNOSIS — E64.0 SEQUELAE OF PROTEIN-CALORIE MALNUTRITION: ICD-10-CM

## 2018-01-01 DIAGNOSIS — K52.9 COLITIS, ACUTE: ICD-10-CM

## 2018-01-01 DIAGNOSIS — C78.01 MALIGNANT NEOPLASM METASTATIC TO RIGHT LUNG: ICD-10-CM

## 2018-01-01 DIAGNOSIS — I10 BENIGN ESSENTIAL HTN: Chronic | ICD-10-CM

## 2018-01-01 DIAGNOSIS — R10.84 ABDOMINAL PAIN, GENERALIZED: ICD-10-CM

## 2018-01-01 DIAGNOSIS — D47.3 ESSENTIAL THROMBOCYTHEMIA: ICD-10-CM

## 2018-01-01 DIAGNOSIS — C78.01 ADENOCARCINOMA METASTATIC TO BOTH LUNGS: ICD-10-CM

## 2018-01-01 DIAGNOSIS — K21.9 GASTROESOPHAGEAL REFLUX DISEASE, ESOPHAGITIS PRESENCE NOT SPECIFIED: ICD-10-CM

## 2018-01-01 DIAGNOSIS — R19.7 DIARRHEA, UNSPECIFIED TYPE: ICD-10-CM

## 2018-01-01 DIAGNOSIS — I70.0 AORTIC ATHEROSCLEROSIS: ICD-10-CM

## 2018-01-01 DIAGNOSIS — N20.0 RENAL CALCULUS, RIGHT: ICD-10-CM

## 2018-01-01 DIAGNOSIS — Z51.12 ENCOUNTER FOR ANTINEOPLASTIC IMMUNOTHERAPY: ICD-10-CM

## 2018-01-01 DIAGNOSIS — F41.9 ANXIETY: ICD-10-CM

## 2018-01-01 DIAGNOSIS — D50.9 IRON DEFICIENCY ANEMIA, UNSPECIFIED IRON DEFICIENCY ANEMIA TYPE: Primary | ICD-10-CM

## 2018-01-01 DIAGNOSIS — R63.0 ANOREXIA: Primary | ICD-10-CM

## 2018-01-01 DIAGNOSIS — K52.9 GASTROENTERITIS: Primary | ICD-10-CM

## 2018-01-01 DIAGNOSIS — R10.13 DYSPEPSIA: ICD-10-CM

## 2018-01-01 DIAGNOSIS — R63.0 ANOREXIA: ICD-10-CM

## 2018-01-01 DIAGNOSIS — N18.4 ANEMIA OF CHRONIC RENAL FAILURE, STAGE 4 (SEVERE): ICD-10-CM

## 2018-01-01 DIAGNOSIS — R53.83 FATIGUE, UNSPECIFIED TYPE: ICD-10-CM

## 2018-01-01 DIAGNOSIS — G89.3 CANCER RELATED PAIN: ICD-10-CM

## 2018-01-01 DIAGNOSIS — C79.51 METASTASIS TO BONE: ICD-10-CM

## 2018-01-01 DIAGNOSIS — R09.81 SINUS CONGESTION: ICD-10-CM

## 2018-01-01 DIAGNOSIS — I25.10 CORONARY ARTERY DISEASE INVOLVING NATIVE CORONARY ARTERY OF NATIVE HEART WITHOUT ANGINA PECTORIS: ICD-10-CM

## 2018-01-01 DIAGNOSIS — N18.30 CKD (CHRONIC KIDNEY DISEASE) STAGE 3, GFR 30-59 ML/MIN: Chronic | ICD-10-CM

## 2018-01-01 DIAGNOSIS — E83.39 HYPOPHOSPHATEMIA: ICD-10-CM

## 2018-01-01 DIAGNOSIS — C34.92 ADENOCARCINOMA OF LUNG, STAGE 4, LEFT: ICD-10-CM

## 2018-01-01 DIAGNOSIS — E86.0 DEHYDRATION: Primary | ICD-10-CM

## 2018-01-01 DIAGNOSIS — I49.9 IRREGULAR HEART RATE: ICD-10-CM

## 2018-01-01 DIAGNOSIS — M89.9 LYTIC BONE LESIONS ON XRAY: ICD-10-CM

## 2018-01-01 DIAGNOSIS — E87.5 HYPERKALEMIA: ICD-10-CM

## 2018-01-01 DIAGNOSIS — I10 ESSENTIAL HYPERTENSION: Primary | ICD-10-CM

## 2018-01-01 DIAGNOSIS — G47.00 INSOMNIA, UNSPECIFIED TYPE: ICD-10-CM

## 2018-01-01 DIAGNOSIS — Z87.19 HISTORY OF COLITIS: ICD-10-CM

## 2018-01-01 DIAGNOSIS — I10 BENIGN ESSENTIAL HTN: Primary | ICD-10-CM

## 2018-01-01 DIAGNOSIS — N18.4 CKD (CHRONIC KIDNEY DISEASE), STAGE IV: ICD-10-CM

## 2018-01-01 DIAGNOSIS — D47.1 MYELOPROLIFERATIVE NEOPLASM: ICD-10-CM

## 2018-01-01 DIAGNOSIS — E86.0 DEHYDRATION: ICD-10-CM

## 2018-01-01 DIAGNOSIS — N40.0 BENIGN PROSTATIC HYPERPLASIA: ICD-10-CM

## 2018-01-01 DIAGNOSIS — I10 ESSENTIAL HYPERTENSION: ICD-10-CM

## 2018-01-01 DIAGNOSIS — I77.819 ECTATIC AORTA: ICD-10-CM

## 2018-01-01 DIAGNOSIS — Z85.819 HISTORY OF THROAT CANCER: ICD-10-CM

## 2018-01-01 DIAGNOSIS — D50.9 IRON DEFICIENCY ANEMIA, UNSPECIFIED IRON DEFICIENCY ANEMIA TYPE: ICD-10-CM

## 2018-01-01 DIAGNOSIS — M54.2 NECK PAIN: Primary | ICD-10-CM

## 2018-01-01 DIAGNOSIS — I71.9 PSEUDOANEURYSM OF AORTA: ICD-10-CM

## 2018-01-01 DIAGNOSIS — E83.42 HYPOMAGNESEMIA: ICD-10-CM

## 2018-01-01 DIAGNOSIS — R94.6 ABNORMAL RESULTS OF THYROID FUNCTION STUDIES: ICD-10-CM

## 2018-01-01 DIAGNOSIS — Z85.819 HISTORY OF THROAT CANCER: Primary | ICD-10-CM

## 2018-01-01 DIAGNOSIS — C34.12 MALIGNANT NEOPLASM OF UPPER LOBE OF LEFT LUNG: ICD-10-CM

## 2018-01-01 DIAGNOSIS — C34.92 ADENOCARCINOMA OF LEFT LUNG, STAGE 4: ICD-10-CM

## 2018-01-01 DIAGNOSIS — C14.0 THROAT CANCER: ICD-10-CM

## 2018-01-01 DIAGNOSIS — C78.01 MALIGNANT NEOPLASM METASTATIC TO RIGHT LUNG: Primary | ICD-10-CM

## 2018-01-01 DIAGNOSIS — R91.8 LUNG MASS: ICD-10-CM

## 2018-01-01 DIAGNOSIS — C78.00 MALIGNANT NEOPLASM METASTATIC TO LUNG, UNSPECIFIED LATERALITY: ICD-10-CM

## 2018-01-01 DIAGNOSIS — C34.12 MALIGNANT NEOPLASM OF UPPER LOBE OF LEFT LUNG: Primary | ICD-10-CM

## 2018-01-01 DIAGNOSIS — Z71.3 NUTRITIONAL COUNSELING: Primary | ICD-10-CM

## 2018-01-01 DIAGNOSIS — I25.10 CORONARY ARTERY DISEASE INVOLVING NATIVE CORONARY ARTERY OF NATIVE HEART WITHOUT ANGINA PECTORIS: Chronic | ICD-10-CM

## 2018-01-01 DIAGNOSIS — N28.9 RENAL INSUFFICIENCY: ICD-10-CM

## 2018-01-01 DIAGNOSIS — Z86.010 HISTORY OF COLON POLYPS: ICD-10-CM

## 2018-01-01 DIAGNOSIS — I49.9 IRREGULAR HEART RATE: Primary | ICD-10-CM

## 2018-01-01 DIAGNOSIS — I10 BENIGN ESSENTIAL HTN: Primary | Chronic | ICD-10-CM

## 2018-01-01 DIAGNOSIS — D50.9 IRON DEFICIENCY ANEMIA: ICD-10-CM

## 2018-01-01 DIAGNOSIS — R63.4 WEIGHT LOSS: ICD-10-CM

## 2018-01-01 DIAGNOSIS — I71.40 ABDOMINAL AORTIC ANEURYSM (AAA) WITHOUT RUPTURE: ICD-10-CM

## 2018-01-01 DIAGNOSIS — D63.1 ANEMIA OF CHRONIC RENAL FAILURE, STAGE 4 (SEVERE): ICD-10-CM

## 2018-01-01 DIAGNOSIS — D64.9 LOW HEMOGLOBIN: Primary | ICD-10-CM

## 2018-01-01 DIAGNOSIS — C78.02 ADENOCARCINOMA METASTATIC TO BOTH LUNGS: ICD-10-CM

## 2018-01-01 DIAGNOSIS — M62.838 MUSCLE SPASM: ICD-10-CM

## 2018-01-01 DIAGNOSIS — R91.8 PULMONARY NODULES: Primary | ICD-10-CM

## 2018-01-01 DIAGNOSIS — I70.0 ATHEROSCLEROSIS OF AORTA: ICD-10-CM

## 2018-01-01 DIAGNOSIS — N40.0 BENIGN PROSTATIC HYPERPLASIA, UNSPECIFIED WHETHER LOWER URINARY TRACT SYMPTOMS PRESENT: ICD-10-CM

## 2018-01-01 DIAGNOSIS — N18.4 CKD (CHRONIC KIDNEY DISEASE), STAGE IV: Primary | ICD-10-CM

## 2018-01-01 DIAGNOSIS — R03.0 ELEVATED BLOOD PRESSURE, SITUATIONAL: ICD-10-CM

## 2018-01-01 LAB
ALBUMIN SERPL BCP-MCNC: 3.1 G/DL
ALBUMIN SERPL BCP-MCNC: 3.1 G/DL
ALBUMIN SERPL BCP-MCNC: 3.2 G/DL
ALBUMIN SERPL BCP-MCNC: 3.3 G/DL
ALBUMIN SERPL BCP-MCNC: 3.5 G/DL
ALBUMIN SERPL BCP-MCNC: 3.5 G/DL
ALBUMIN SERPL BCP-MCNC: 3.9 G/DL
ALP SERPL-CCNC: 122 U/L
ALP SERPL-CCNC: 123 U/L
ALP SERPL-CCNC: 129 U/L
ALP SERPL-CCNC: 131 U/L
ALP SERPL-CCNC: 159 U/L
ALP SERPL-CCNC: 163 U/L
ALP SERPL-CCNC: 170 U/L
ALT SERPL W/O P-5'-P-CCNC: 10 U/L
ALT SERPL W/O P-5'-P-CCNC: 14 U/L
ALT SERPL W/O P-5'-P-CCNC: 6 U/L
ALT SERPL W/O P-5'-P-CCNC: 8 U/L
ALT SERPL W/O P-5'-P-CCNC: 8 U/L
ALT SERPL W/O P-5'-P-CCNC: 9 U/L
ALT SERPL W/O P-5'-P-CCNC: 9 U/L
ANION GAP SERPL CALC-SCNC: 10 MMOL/L
ANION GAP SERPL CALC-SCNC: 10 MMOL/L
ANION GAP SERPL CALC-SCNC: 11 MMOL/L
ANION GAP SERPL CALC-SCNC: 11 MMOL/L
ANION GAP SERPL CALC-SCNC: 6 MMOL/L
ANION GAP SERPL CALC-SCNC: 8 MMOL/L
ANION GAP SERPL CALC-SCNC: 9 MMOL/L
ANISOCYTOSIS BLD QL SMEAR: SLIGHT
AORTIC VALVE REGURGITATION: ABNORMAL
AST SERPL-CCNC: 13 U/L
AST SERPL-CCNC: 14 U/L
AST SERPL-CCNC: 15 U/L
AST SERPL-CCNC: 16 U/L
AST SERPL-CCNC: 17 U/L
AST SERPL-CCNC: 17 U/L
AST SERPL-CCNC: 18 U/L
BACTERIA BLD CULT: NORMAL
BACTERIA BLD CULT: NORMAL
BACTERIA UR CULT: NORMAL
BACTERIA UR CULT: NORMAL
BASOPHILS # BLD AUTO: 0.03 K/UL
BASOPHILS # BLD AUTO: 0.05 K/UL
BASOPHILS # BLD AUTO: 0.07 K/UL
BASOPHILS # BLD AUTO: 0.07 K/UL
BASOPHILS # BLD AUTO: 0.08 K/UL
BASOPHILS NFR BLD: 0.1 %
BASOPHILS NFR BLD: 0.2 %
BASOPHILS NFR BLD: 0.3 %
BASOPHILS NFR BLD: 0.3 %
BASOPHILS NFR BLD: 0.4 %
BASOPHILS NFR BLD: 0.5 %
BASOPHILS NFR BLD: 0.6 %
BASOPHILS NFR BLD: 0.6 %
BILIRUB SERPL-MCNC: 0.6 MG/DL
BILIRUB SERPL-MCNC: 0.6 MG/DL
BILIRUB SERPL-MCNC: 0.7 MG/DL
BILIRUB SERPL-MCNC: 0.7 MG/DL
BILIRUB SERPL-MCNC: 0.8 MG/DL
BILIRUB SERPL-MCNC: 0.8 MG/DL
BILIRUB SERPL-MCNC: 0.9 MG/DL
BILIRUB UR QL STRIP: NEGATIVE
BUN SERPL-MCNC: 14 MG/DL
BUN SERPL-MCNC: 14 MG/DL
BUN SERPL-MCNC: 16 MG/DL
BUN SERPL-MCNC: 16 MG/DL
BUN SERPL-MCNC: 17 MG/DL
BUN SERPL-MCNC: 18 MG/DL
BUN SERPL-MCNC: 22 MG/DL
BUN SERPL-MCNC: 24 MG/DL
CALCIUM SERPL-MCNC: 7.9 MG/DL
CALCIUM SERPL-MCNC: 8 MG/DL
CALCIUM SERPL-MCNC: 8.3 MG/DL
CALCIUM SERPL-MCNC: 8.3 MG/DL
CALCIUM SERPL-MCNC: 8.8 MG/DL
CALCIUM SERPL-MCNC: 8.9 MG/DL
CALCIUM SERPL-MCNC: 9 MG/DL
CALCIUM SERPL-MCNC: 9 MG/DL
CALCIUM SERPL-MCNC: 9.3 MG/DL
CHLORIDE SERPL-SCNC: 106 MMOL/L
CHLORIDE SERPL-SCNC: 108 MMOL/L
CHLORIDE SERPL-SCNC: 109 MMOL/L
CHLORIDE SERPL-SCNC: 110 MMOL/L
CHLORIDE SERPL-SCNC: 115 MMOL/L
CHLORIDE SERPL-SCNC: 116 MMOL/L
CHLORIDE SERPL-SCNC: 117 MMOL/L
CLARITY UR REFRACT.AUTO: CLEAR
CLARITY UR: CLEAR
CO2 SERPL-SCNC: 16 MMOL/L
CO2 SERPL-SCNC: 18 MMOL/L
CO2 SERPL-SCNC: 18 MMOL/L
CO2 SERPL-SCNC: 19 MMOL/L
CO2 SERPL-SCNC: 19 MMOL/L
CO2 SERPL-SCNC: 20 MMOL/L
CO2 SERPL-SCNC: 20 MMOL/L
CO2 SERPL-SCNC: 21 MMOL/L
CO2 SERPL-SCNC: 22 MMOL/L
CO2 SERPL-SCNC: 23 MMOL/L
CO2 SERPL-SCNC: 27 MMOL/L
COLOR UR AUTO: YELLOW
COLOR UR: YELLOW
CREAT SERPL-MCNC: 1.4 MG/DL
CREAT SERPL-MCNC: 1.5 MG/DL
CREAT SERPL-MCNC: 1.6 MG/DL
CREAT SERPL-MCNC: 1.6 MG/DL
CREAT SERPL-MCNC: 1.7 MG/DL
CREAT SERPL-MCNC: 1.8 MG/DL
CREAT SERPL-MCNC: 1.9 MG/DL
CREAT SERPL-MCNC: 2 MG/DL
CREAT UR-MCNC: 110 MG/DL
CREAT UR-MCNC: 213 MG/DL
DIFFERENTIAL METHOD: ABNORMAL
EOSINOPHIL # BLD AUTO: 0 K/UL
EOSINOPHIL # BLD AUTO: 0.2 K/UL
EOSINOPHIL # BLD AUTO: 0.4 K/UL
EOSINOPHIL # BLD AUTO: 0.4 K/UL
EOSINOPHIL # BLD AUTO: 0.5 K/UL
EOSINOPHIL # BLD AUTO: 0.5 K/UL
EOSINOPHIL NFR BLD: 0.1 %
EOSINOPHIL NFR BLD: 1.2 %
EOSINOPHIL NFR BLD: 1.3 %
EOSINOPHIL NFR BLD: 2 %
EOSINOPHIL NFR BLD: 3.1 %
EOSINOPHIL NFR BLD: 3.8 %
EOSINOPHIL NFR BLD: 4.6 %
EOSINOPHIL NFR BLD: 5.6 %
ERYTHROCYTE [DISTWIDTH] IN BLOOD BY AUTOMATED COUNT: 15.7 %
ERYTHROCYTE [DISTWIDTH] IN BLOOD BY AUTOMATED COUNT: 15.9 %
ERYTHROCYTE [DISTWIDTH] IN BLOOD BY AUTOMATED COUNT: 16 %
ERYTHROCYTE [DISTWIDTH] IN BLOOD BY AUTOMATED COUNT: 16 %
ERYTHROCYTE [DISTWIDTH] IN BLOOD BY AUTOMATED COUNT: 16.2 %
ERYTHROCYTE [DISTWIDTH] IN BLOOD BY AUTOMATED COUNT: 16.2 %
ERYTHROCYTE [DISTWIDTH] IN BLOOD BY AUTOMATED COUNT: 17.9 %
ERYTHROCYTE [DISTWIDTH] IN BLOOD BY AUTOMATED COUNT: 18 %
ERYTHROCYTE [DISTWIDTH] IN BLOOD BY AUTOMATED COUNT: 18.1 %
ERYTHROCYTE [DISTWIDTH] IN BLOOD BY AUTOMATED COUNT: 18.1 %
ERYTHROCYTE [DISTWIDTH] IN BLOOD BY AUTOMATED COUNT: 18.2 %
ERYTHROCYTE [DISTWIDTH] IN BLOOD BY AUTOMATED COUNT: 18.2 %
EST. GFR  (AFRICAN AMERICAN): 35 ML/MIN/1.73 M^2
EST. GFR  (AFRICAN AMERICAN): 37 ML/MIN/1.73 M^2
EST. GFR  (AFRICAN AMERICAN): 40 ML/MIN/1.73 M^2
EST. GFR  (AFRICAN AMERICAN): 42 ML/MIN/1.73 M^2
EST. GFR  (AFRICAN AMERICAN): 46 ML/MIN/1.73 M^2
EST. GFR  (AFRICAN AMERICAN): 46 ML/MIN/1.73 M^2
EST. GFR  (AFRICAN AMERICAN): 49 ML/MIN/1.73 M^2
EST. GFR  (AFRICAN AMERICAN): 50 ML/MIN/1.73 M^2
EST. GFR  (AFRICAN AMERICAN): 54 ML/MIN/1.73 M^2
EST. GFR  (NON AFRICAN AMERICAN): 30 ML/MIN/1.73 M^2
EST. GFR  (NON AFRICAN AMERICAN): 32 ML/MIN/1.73 M^2
EST. GFR  (NON AFRICAN AMERICAN): 35 ML/MIN/1.73 M^2
EST. GFR  (NON AFRICAN AMERICAN): 37 ML/MIN/1.73 M^2
EST. GFR  (NON AFRICAN AMERICAN): 40 ML/MIN/1.73 M^2
EST. GFR  (NON AFRICAN AMERICAN): 40 ML/MIN/1.73 M^2
EST. GFR  (NON AFRICAN AMERICAN): 43 ML/MIN/1.73 M^2
EST. GFR  (NON AFRICAN AMERICAN): 46 ML/MIN/1.73 M^2
ESTIMATED AVG GLUCOSE: 108 MG/DL
ESTIMATED PA SYSTOLIC PRESSURE: 25.28
GIANT PLATELETS BLD QL SMEAR: PRESENT
GLOBAL PERICARDIAL EFFUSION: ABNORMAL
GLUCOSE SERPL-MCNC: 103 MG/DL
GLUCOSE SERPL-MCNC: 104 MG/DL
GLUCOSE SERPL-MCNC: 104 MG/DL
GLUCOSE SERPL-MCNC: 106 MG/DL
GLUCOSE SERPL-MCNC: 111 MG/DL
GLUCOSE SERPL-MCNC: 122 MG/DL
GLUCOSE SERPL-MCNC: 127 MG/DL
GLUCOSE SERPL-MCNC: 147 MG/DL
GLUCOSE SERPL-MCNC: 82 MG/DL
GLUCOSE SERPL-MCNC: 92 MG/DL
GLUCOSE SERPL-MCNC: 94 MG/DL
GLUCOSE UR QL STRIP: ABNORMAL
GLUCOSE UR QL STRIP: NEGATIVE
HBA1C MFR BLD HPLC: 5.4 %
HCT VFR BLD AUTO: 35.1 %
HCT VFR BLD AUTO: 35.3 %
HCT VFR BLD AUTO: 35.4 %
HCT VFR BLD AUTO: 36.7 %
HCT VFR BLD AUTO: 36.8 %
HCT VFR BLD AUTO: 37.1 %
HCT VFR BLD AUTO: 37.1 %
HCT VFR BLD AUTO: 37.6 %
HCT VFR BLD AUTO: 37.9 %
HCT VFR BLD AUTO: 39.2 %
HCT VFR BLD AUTO: 40.2 %
HCT VFR BLD AUTO: 47.3 %
HGB BLD-MCNC: 10.6 G/DL
HGB BLD-MCNC: 10.7 G/DL
HGB BLD-MCNC: 11 G/DL
HGB BLD-MCNC: 11.3 G/DL
HGB BLD-MCNC: 11.5 G/DL
HGB BLD-MCNC: 11.6 G/DL
HGB BLD-MCNC: 12 G/DL
HGB BLD-MCNC: 12.2 G/DL
HGB BLD-MCNC: 12.3 G/DL
HGB BLD-MCNC: 14.6 G/DL
HGB UR QL STRIP: NEGATIVE
INR PPP: 1
KETONES UR QL STRIP: NEGATIVE
LACTATE SERPL-SCNC: 1.2 MMOL/L
LEUKOCYTE ESTERASE UR QL STRIP: NEGATIVE
LIPASE SERPL-CCNC: 13 U/L
LIPASE SERPL-CCNC: 17 U/L
LYMPHOCYTES # BLD AUTO: 0.7 K/UL
LYMPHOCYTES # BLD AUTO: 0.9 K/UL
LYMPHOCYTES # BLD AUTO: 0.9 K/UL
LYMPHOCYTES # BLD AUTO: 1 K/UL
LYMPHOCYTES # BLD AUTO: 1 K/UL
LYMPHOCYTES # BLD AUTO: 1.1 K/UL
LYMPHOCYTES # BLD AUTO: 1.2 K/UL
LYMPHOCYTES # BLD AUTO: 1.3 K/UL
LYMPHOCYTES NFR BLD: 11.2 %
LYMPHOCYTES NFR BLD: 3.2 %
LYMPHOCYTES NFR BLD: 4.9 %
LYMPHOCYTES NFR BLD: 7.6 %
LYMPHOCYTES NFR BLD: 8.8 %
LYMPHOCYTES NFR BLD: 9.5 %
LYMPHOCYTES NFR BLD: 9.6 %
LYMPHOCYTES NFR BLD: 9.8 %
MAGNESIUM SERPL-MCNC: 1.2 MG/DL
MAGNESIUM SERPL-MCNC: 1.3 MG/DL
MAGNESIUM SERPL-MCNC: 1.6 MG/DL
MAGNESIUM SERPL-MCNC: 1.7 MG/DL
MCH RBC QN AUTO: 23.9 PG
MCH RBC QN AUTO: 24.4 PG
MCH RBC QN AUTO: 24.8 PG
MCH RBC QN AUTO: 25 PG
MCH RBC QN AUTO: 25.2 PG
MCH RBC QN AUTO: 25.2 PG
MCH RBC QN AUTO: 25.6 PG
MCH RBC QN AUTO: 25.7 PG
MCH RBC QN AUTO: 26.8 PG
MCHC RBC AUTO-ENTMCNC: 30 G/DL
MCHC RBC AUTO-ENTMCNC: 30.3 G/DL
MCHC RBC AUTO-ENTMCNC: 30.5 G/DL
MCHC RBC AUTO-ENTMCNC: 30.5 G/DL
MCHC RBC AUTO-ENTMCNC: 30.7 G/DL
MCHC RBC AUTO-ENTMCNC: 30.8 G/DL
MCHC RBC AUTO-ENTMCNC: 30.9 G/DL
MCHC RBC AUTO-ENTMCNC: 30.9 G/DL
MCHC RBC AUTO-ENTMCNC: 31 G/DL
MCHC RBC AUTO-ENTMCNC: 31.1 G/DL
MCHC RBC AUTO-ENTMCNC: 31.4 G/DL
MCHC RBC AUTO-ENTMCNC: 31.7 G/DL
MCV RBC AUTO: 80 FL
MCV RBC AUTO: 81 FL
MCV RBC AUTO: 82 FL
MCV RBC AUTO: 82 FL
MCV RBC AUTO: 85 FL
MCV RBC AUTO: 87 FL
MITRAL VALVE REGURGITATION: ABNORMAL
MONOCYTES # BLD AUTO: 0.6 K/UL
MONOCYTES # BLD AUTO: 0.6 K/UL
MONOCYTES # BLD AUTO: 0.7 K/UL
MONOCYTES # BLD AUTO: 0.7 K/UL
MONOCYTES # BLD AUTO: 0.8 K/UL
MONOCYTES # BLD AUTO: 0.9 K/UL
MONOCYTES NFR BLD: 3.3 %
MONOCYTES NFR BLD: 4 %
MONOCYTES NFR BLD: 4.9 %
MONOCYTES NFR BLD: 5.9 %
MONOCYTES NFR BLD: 6.3 %
MONOCYTES NFR BLD: 6.4 %
MONOCYTES NFR BLD: 7.5 %
MONOCYTES NFR BLD: 8.4 %
NEUTROPHILS # BLD AUTO: 10.5 K/UL
NEUTROPHILS # BLD AUTO: 10.7 K/UL
NEUTROPHILS # BLD AUTO: 11.2 K/UL
NEUTROPHILS # BLD AUTO: 11.3 K/UL
NEUTROPHILS # BLD AUTO: 11.4 K/UL
NEUTROPHILS # BLD AUTO: 12.3 K/UL
NEUTROPHILS # BLD AUTO: 15.5 K/UL
NEUTROPHILS # BLD AUTO: 20.3 K/UL
NEUTROPHILS # BLD AUTO: 6.8 K/UL
NEUTROPHILS # BLD AUTO: 7.8 K/UL
NEUTROPHILS # BLD AUTO: 8.6 K/UL
NEUTROPHILS # BLD AUTO: 9.5 K/UL
NEUTROPHILS NFR BLD: 76.9 %
NEUTROPHILS NFR BLD: 77.6 %
NEUTROPHILS NFR BLD: 78.4 %
NEUTROPHILS NFR BLD: 79.6 %
NEUTROPHILS NFR BLD: 83.6 %
NEUTROPHILS NFR BLD: 83.6 %
NEUTROPHILS NFR BLD: 89.1 %
NEUTROPHILS NFR BLD: 93.3 %
NITRITE UR QL STRIP: NEGATIVE
OSMOLALITY UR: 378 MOSM/KG
PH UR STRIP: 5 [PH] (ref 5–8)
PH UR STRIP: 6 [PH] (ref 5–8)
PHOSPHATE SERPL-MCNC: 2.2 MG/DL
PHOSPHATE SERPL-MCNC: 2.3 MG/DL
PHOSPHATE SERPL-MCNC: 2.6 MG/DL
PHOSPHATE SERPL-MCNC: 2.6 MG/DL
PLATELET # BLD AUTO: 324 K/UL
PLATELET # BLD AUTO: 345 K/UL
PLATELET # BLD AUTO: 364 K/UL
PLATELET # BLD AUTO: 372 K/UL
PLATELET # BLD AUTO: 421 K/UL
PLATELET # BLD AUTO: 473 K/UL
PLATELET # BLD AUTO: 479 K/UL
PLATELET # BLD AUTO: 487 K/UL
PLATELET # BLD AUTO: 518 K/UL
PLATELET # BLD AUTO: 530 K/UL
PLATELET # BLD AUTO: 534 K/UL
PLATELET # BLD AUTO: 626 K/UL
PLATELET BLD QL SMEAR: ABNORMAL
PMV BLD AUTO: 10.5 FL
PMV BLD AUTO: 10.7 FL
PMV BLD AUTO: 11 FL
PMV BLD AUTO: 11 FL
PMV BLD AUTO: 11.1 FL
PMV BLD AUTO: 11.2 FL
PMV BLD AUTO: 11.4 FL
PMV BLD AUTO: 11.4 FL
POCT GLUCOSE: 102 MG/DL (ref 70–110)
POCT GLUCOSE: 114 MG/DL (ref 70–110)
POCT GLUCOSE: 133 MG/DL (ref 70–110)
POCT GLUCOSE: 92 MG/DL (ref 70–110)
POCT GLUCOSE: 96 MG/DL (ref 70–110)
POCT GLUCOSE: 97 MG/DL (ref 70–110)
POTASSIUM SERPL-SCNC: 3.4 MMOL/L
POTASSIUM SERPL-SCNC: 3.5 MMOL/L
POTASSIUM SERPL-SCNC: 3.7 MMOL/L
POTASSIUM SERPL-SCNC: 4 MMOL/L
POTASSIUM SERPL-SCNC: 4 MMOL/L
POTASSIUM SERPL-SCNC: 4.4 MMOL/L
POTASSIUM SERPL-SCNC: 4.9 MMOL/L
POTASSIUM SERPL-SCNC: 5 MMOL/L
POTASSIUM SERPL-SCNC: 5.1 MMOL/L
POTASSIUM SERPL-SCNC: 5.4 MMOL/L
POTASSIUM SERPL-SCNC: 6.5 MMOL/L
POTASSIUM SERPL-SCNC: 7.1 MMOL/L
PROT SERPL-MCNC: 6 G/DL
PROT SERPL-MCNC: 6.2 G/DL
PROT SERPL-MCNC: 6.3 G/DL
PROT SERPL-MCNC: 6.4 G/DL
PROT SERPL-MCNC: 6.4 G/DL
PROT SERPL-MCNC: 6.5 G/DL
PROT SERPL-MCNC: 7.3 G/DL
PROT UR QL STRIP: NEGATIVE
PROT UR-MCNC: 13 MG/DL
PROT UR-MCNC: 9 MG/DL
PROT/CREAT RATIO, UR: 0.06
PROT/CREAT RATIO, UR: 0.08
PROTHROMBIN TIME: 11.6 SEC
RBC # BLD AUTO: 4.33 M/UL
RBC # BLD AUTO: 4.39 M/UL
RBC # BLD AUTO: 4.4 M/UL
RBC # BLD AUTO: 4.43 M/UL
RBC # BLD AUTO: 4.48 M/UL
RBC # BLD AUTO: 4.52 M/UL
RBC # BLD AUTO: 4.55 M/UL
RBC # BLD AUTO: 4.6 M/UL
RBC # BLD AUTO: 4.67 M/UL
RBC # BLD AUTO: 4.76 M/UL
RBC # BLD AUTO: 4.89 M/UL
RBC # BLD AUTO: 5.83 M/UL
RETIRED EF AND QEF - SEE NOTES: 60 (ref 55–65)
SODIUM SERPL-SCNC: 136 MMOL/L
SODIUM SERPL-SCNC: 136 MMOL/L
SODIUM SERPL-SCNC: 137 MMOL/L
SODIUM SERPL-SCNC: 138 MMOL/L
SODIUM SERPL-SCNC: 139 MMOL/L
SODIUM SERPL-SCNC: 140 MMOL/L
SODIUM SERPL-SCNC: 140 MMOL/L
SODIUM SERPL-SCNC: 142 MMOL/L
SODIUM UR-SCNC: 51 MMOL/L
SP GR UR STRIP: 1.01 (ref 1–1.03)
SP GR UR STRIP: 1.02 (ref 1–1.03)
TRICUSPID VALVE REGURGITATION: ABNORMAL
TSH SERPL DL<=0.005 MIU/L-ACNC: 1.61 UIU/ML
TSH SERPL DL<=0.005 MIU/L-ACNC: 1.68 UIU/ML
TSH SERPL DL<=0.005 MIU/L-ACNC: 2.15 UIU/ML
URN SPEC COLLECT METH UR: ABNORMAL
URN SPEC COLLECT METH UR: NORMAL
UROBILINOGEN UR STRIP-ACNC: NEGATIVE EU/DL
WBC # BLD AUTO: 10.13 K/UL
WBC # BLD AUTO: 10.91 K/UL
WBC # BLD AUTO: 11.9 K/UL
WBC # BLD AUTO: 12.76 K/UL
WBC # BLD AUTO: 13.15 K/UL
WBC # BLD AUTO: 13.39 K/UL
WBC # BLD AUTO: 13.59 K/UL
WBC # BLD AUTO: 13.6 K/UL
WBC # BLD AUTO: 15.5 K/UL
WBC # BLD AUTO: 17.33 K/UL
WBC # BLD AUTO: 21.77 K/UL
WBC # BLD AUTO: 8.79 K/UL

## 2018-01-01 PROCEDURE — 99499 UNLISTED E&M SERVICE: CPT | Mod: S$GLB,,, | Performed by: INTERNAL MEDICINE

## 2018-01-01 PROCEDURE — 63600175 PHARM REV CODE 636 W HCPCS: Performed by: PHYSICIAN ASSISTANT

## 2018-01-01 PROCEDURE — 84100 ASSAY OF PHOSPHORUS: CPT

## 2018-01-01 PROCEDURE — 85027 COMPLETE CBC AUTOMATED: CPT

## 2018-01-01 PROCEDURE — 3074F SYST BP LT 130 MM HG: CPT | Mod: CPTII,S$GLB,, | Performed by: INTERNAL MEDICINE

## 2018-01-01 PROCEDURE — 88333 PATH CONSLTJ SURG CYTO XM 1: CPT | Mod: 26,,, | Performed by: PATHOLOGY

## 2018-01-01 PROCEDURE — 37000008 HC ANESTHESIA 1ST 15 MINUTES: Performed by: INTERNAL MEDICINE

## 2018-01-01 PROCEDURE — 88305 TISSUE EXAM BY PATHOLOGIST: CPT | Performed by: PATHOLOGY

## 2018-01-01 PROCEDURE — 11000001 HC ACUTE MED/SURG PRIVATE ROOM

## 2018-01-01 PROCEDURE — D9220A PRA ANESTHESIA: Mod: CRNA,,, | Performed by: NURSE ANESTHETIST, CERTIFIED REGISTERED

## 2018-01-01 PROCEDURE — 96413 CHEMO IV INFUSION 1 HR: CPT

## 2018-01-01 PROCEDURE — 25000003 PHARM REV CODE 250: Performed by: INTERNAL MEDICINE

## 2018-01-01 PROCEDURE — 96375 TX/PRO/DX INJ NEW DRUG ADDON: CPT

## 2018-01-01 PROCEDURE — 3078F DIAST BP <80 MM HG: CPT | Mod: CPTII,S$GLB,, | Performed by: INTERNAL MEDICINE

## 2018-01-01 PROCEDURE — 63600175 PHARM REV CODE 636 W HCPCS: Performed by: NURSE ANESTHETIST, CERTIFIED REGISTERED

## 2018-01-01 PROCEDURE — 99204 OFFICE O/P NEW MOD 45 MIN: CPT | Mod: S$GLB,,, | Performed by: INTERNAL MEDICINE

## 2018-01-01 PROCEDURE — 80053 COMPREHEN METABOLIC PANEL: CPT

## 2018-01-01 PROCEDURE — 25000003 PHARM REV CODE 250: Performed by: PHYSICIAN ASSISTANT

## 2018-01-01 PROCEDURE — 63600175 PHARM REV CODE 636 W HCPCS: Performed by: EMERGENCY MEDICINE

## 2018-01-01 PROCEDURE — 3008F BODY MASS INDEX DOCD: CPT | Mod: S$GLB,,, | Performed by: NURSE PRACTITIONER

## 2018-01-01 PROCEDURE — 99999 PR PBB SHADOW E&M-EST. PATIENT-LVL III: CPT | Mod: PBBFAC,,, | Performed by: INTERNAL MEDICINE

## 2018-01-01 PROCEDURE — 78815 PET IMAGE W/CT SKULL-THIGH: CPT | Mod: TC,HCNC

## 2018-01-01 PROCEDURE — 83735 ASSAY OF MAGNESIUM: CPT

## 2018-01-01 PROCEDURE — 99215 OFFICE O/P EST HI 40 MIN: CPT | Mod: S$PBB,,, | Performed by: INTERNAL MEDICINE

## 2018-01-01 PROCEDURE — 88305 TISSUE EXAM BY PATHOLOGIST: CPT | Mod: 26,,, | Performed by: PATHOLOGY

## 2018-01-01 PROCEDURE — 99239 HOSP IP/OBS DSCHRG MGMT >30: CPT | Mod: ,,, | Performed by: HOSPITALIST

## 2018-01-01 PROCEDURE — A9552 F18 FDG: HCPCS

## 2018-01-01 PROCEDURE — 96360 HYDRATION IV INFUSION INIT: CPT

## 2018-01-01 PROCEDURE — 1101F PT FALLS ASSESS-DOCD LE1/YR: CPT | Mod: CPTII,HCNC,S$GLB, | Performed by: INTERNAL MEDICINE

## 2018-01-01 PROCEDURE — 96361 HYDRATE IV INFUSION ADD-ON: CPT

## 2018-01-01 PROCEDURE — 43239 EGD BIOPSY SINGLE/MULTIPLE: CPT | Performed by: INTERNAL MEDICINE

## 2018-01-01 PROCEDURE — 85610 PROTHROMBIN TIME: CPT

## 2018-01-01 PROCEDURE — 25000003 PHARM REV CODE 250: Performed by: NURSE ANESTHETIST, CERTIFIED REGISTERED

## 2018-01-01 PROCEDURE — 99213 OFFICE O/P EST LOW 20 MIN: CPT | Mod: PBBFAC,25 | Performed by: INTERNAL MEDICINE

## 2018-01-01 PROCEDURE — 96374 THER/PROPH/DIAG INJ IV PUSH: CPT

## 2018-01-01 PROCEDURE — 82962 GLUCOSE BLOOD TEST: CPT

## 2018-01-01 PROCEDURE — 93306 TTE W/DOPPLER COMPLETE: CPT

## 2018-01-01 PROCEDURE — 87040 BLOOD CULTURE FOR BACTERIA: CPT | Mod: 59

## 2018-01-01 PROCEDURE — 27201012 HC FORCEPS, HOT/COLD, DISP: Performed by: INTERNAL MEDICINE

## 2018-01-01 PROCEDURE — 99215 OFFICE O/P EST HI 40 MIN: CPT | Mod: S$GLB,,, | Performed by: INTERNAL MEDICINE

## 2018-01-01 PROCEDURE — 99214 OFFICE O/P EST MOD 30 MIN: CPT | Mod: HCNC,S$GLB,, | Performed by: INTERNAL MEDICINE

## 2018-01-01 PROCEDURE — 84443 ASSAY THYROID STIM HORMONE: CPT

## 2018-01-01 PROCEDURE — 25000003 PHARM REV CODE 250: Performed by: EMERGENCY MEDICINE

## 2018-01-01 PROCEDURE — 45378 DIAGNOSTIC COLONOSCOPY: CPT | Performed by: INTERNAL MEDICINE

## 2018-01-01 PROCEDURE — 63600175 PHARM REV CODE 636 W HCPCS: Mod: JG | Performed by: INTERNAL MEDICINE

## 2018-01-01 PROCEDURE — 93005 ELECTROCARDIOGRAM TRACING: CPT

## 2018-01-01 PROCEDURE — 25000003 PHARM REV CODE 250: Mod: HCNC | Performed by: INTERNAL MEDICINE

## 2018-01-01 PROCEDURE — 3075F SYST BP GE 130 - 139MM HG: CPT | Mod: CPTII,S$GLB,, | Performed by: INTERNAL MEDICINE

## 2018-01-01 PROCEDURE — 83690 ASSAY OF LIPASE: CPT

## 2018-01-01 PROCEDURE — 93010 ELECTROCARDIOGRAM REPORT: CPT | Mod: HCNC,,, | Performed by: INTERNAL MEDICINE

## 2018-01-01 PROCEDURE — 78815 PET IMAGE W/CT SKULL-THIGH: CPT | Mod: 26,PS,, | Performed by: RADIOLOGY

## 2018-01-01 PROCEDURE — 99999 PR PBB SHADOW E&M-EST. PATIENT-LVL IV: CPT | Mod: PBBFAC,,, | Performed by: INTERNAL MEDICINE

## 2018-01-01 PROCEDURE — 99499 UNLISTED E&M SERVICE: CPT | Mod: S$GLB,,, | Performed by: NURSE PRACTITIONER

## 2018-01-01 PROCEDURE — 3078F DIAST BP <80 MM HG: CPT | Mod: CPTII,,, | Performed by: INTERNAL MEDICINE

## 2018-01-01 PROCEDURE — 83036 HEMOGLOBIN GLYCOSYLATED A1C: CPT

## 2018-01-01 PROCEDURE — 70551 MRI BRAIN STEM W/O DYE: CPT | Mod: 26,,, | Performed by: RADIOLOGY

## 2018-01-01 PROCEDURE — 99999 PR PBB SHADOW E&M-EST. PATIENT-LVL II: CPT | Mod: PBBFAC,,,

## 2018-01-01 PROCEDURE — 99214 OFFICE O/P EST MOD 30 MIN: CPT | Mod: S$GLB,,, | Performed by: INTERNAL MEDICINE

## 2018-01-01 PROCEDURE — 99999 PR PBB SHADOW E&M-EST. PATIENT-LVL IV: CPT | Mod: PBBFAC,HCNC,, | Performed by: INTERNAL MEDICINE

## 2018-01-01 PROCEDURE — 1159F MED LIST DOCD IN RCRD: CPT | Mod: S$GLB,,, | Performed by: INTERNAL MEDICINE

## 2018-01-01 PROCEDURE — 85025 COMPLETE CBC W/AUTO DIFF WBC: CPT

## 2018-01-01 PROCEDURE — 96366 THER/PROPH/DIAG IV INF ADDON: CPT | Mod: HCNC

## 2018-01-01 PROCEDURE — 81003 URINALYSIS AUTO W/O SCOPE: CPT

## 2018-01-01 PROCEDURE — 87077 CULTURE AEROBIC IDENTIFY: CPT

## 2018-01-01 PROCEDURE — 99213 OFFICE O/P EST LOW 20 MIN: CPT | Mod: PBBFAC | Performed by: INTERNAL MEDICINE

## 2018-01-01 PROCEDURE — 96360 HYDRATION IV INFUSION INIT: CPT | Mod: HCNC

## 2018-01-01 PROCEDURE — 25000003 PHARM REV CODE 250: Performed by: RADIOLOGY

## 2018-01-01 PROCEDURE — 0DB68ZX EXCISION OF STOMACH, VIA NATURAL OR ARTIFICIAL OPENING ENDOSCOPIC, DIAGNOSTIC: ICD-10-PCS | Performed by: INTERNAL MEDICINE

## 2018-01-01 PROCEDURE — 94761 N-INVAS EAR/PLS OXIMETRY MLT: CPT

## 2018-01-01 PROCEDURE — 82570 ASSAY OF URINE CREATININE: CPT

## 2018-01-01 PROCEDURE — 63600175 PHARM REV CODE 636 W HCPCS

## 2018-01-01 PROCEDURE — 36415 COLL VENOUS BLD VENIPUNCTURE: CPT

## 2018-01-01 PROCEDURE — 1101F PT FALLS ASSESS-DOCD LE1/YR: CPT | Mod: CPTII,,, | Performed by: INTERNAL MEDICINE

## 2018-01-01 PROCEDURE — 97802 MEDICAL NUTRITION INDIV IN: CPT

## 2018-01-01 PROCEDURE — 99284 EMERGENCY DEPT VISIT MOD MDM: CPT | Mod: 25

## 2018-01-01 PROCEDURE — 88342 IMHCHEM/IMCYTCHM 1ST ANTB: CPT | Mod: 26,,, | Performed by: PATHOLOGY

## 2018-01-01 PROCEDURE — 80048 BASIC METABOLIC PNL TOTAL CA: CPT

## 2018-01-01 PROCEDURE — 99285 EMERGENCY DEPT VISIT HI MDM: CPT | Mod: 25

## 2018-01-01 PROCEDURE — 78815 PET IMAGE W/CT SKULL-THIGH: CPT | Mod: TC,PS

## 2018-01-01 PROCEDURE — 78815 PET IMAGE W/CT SKULL-THIGH: CPT | Mod: TC,PI

## 2018-01-01 PROCEDURE — 70551 MRI BRAIN STEM W/O DYE: CPT | Mod: TC

## 2018-01-01 PROCEDURE — 87186 SC STD MICRODIL/AGAR DIL: CPT

## 2018-01-01 PROCEDURE — 3077F SYST BP >= 140 MM HG: CPT | Mod: CPTII,S$GLB,, | Performed by: INTERNAL MEDICINE

## 2018-01-01 PROCEDURE — 99204 OFFICE O/P NEW MOD 45 MIN: CPT | Mod: S$GLB,,, | Performed by: NURSE PRACTITIONER

## 2018-01-01 PROCEDURE — 87088 URINE BACTERIA CULTURE: CPT

## 2018-01-01 PROCEDURE — 84300 ASSAY OF URINE SODIUM: CPT

## 2018-01-01 PROCEDURE — 83935 ASSAY OF URINE OSMOLALITY: CPT

## 2018-01-01 PROCEDURE — 99223 1ST HOSP IP/OBS HIGH 75: CPT | Mod: ,,, | Performed by: HOSPITALIST

## 2018-01-01 PROCEDURE — 1159F MED LIST DOCD IN RCRD: CPT | Mod: S$GLB,,, | Performed by: NURSE PRACTITIONER

## 2018-01-01 PROCEDURE — 63600175 PHARM REV CODE 636 W HCPCS: Mod: HCNC | Performed by: INTERNAL MEDICINE

## 2018-01-01 PROCEDURE — 3074F SYST BP LT 130 MM HG: CPT | Mod: CPTII,,, | Performed by: INTERNAL MEDICINE

## 2018-01-01 PROCEDURE — 99223 1ST HOSP IP/OBS HIGH 75: CPT | Mod: ,,, | Performed by: INTERNAL MEDICINE

## 2018-01-01 PROCEDURE — S0028 INJECTION, FAMOTIDINE, 20 MG: HCPCS | Performed by: EMERGENCY MEDICINE

## 2018-01-01 PROCEDURE — 99999 PR PBB SHADOW E&M-EST. PATIENT-LVL III: CPT | Mod: PBBFAC,,, | Performed by: NURSE PRACTITIONER

## 2018-01-01 PROCEDURE — 96360 HYDRATION IV INFUSION INIT: CPT | Mod: 59

## 2018-01-01 PROCEDURE — 3079F DIAST BP 80-89 MM HG: CPT | Mod: CPTII,S$GLB,, | Performed by: INTERNAL MEDICINE

## 2018-01-01 PROCEDURE — 78815 PET IMAGE W/CT SKULL-THIGH: CPT | Mod: 26,HCNC,PS, | Performed by: RADIOLOGY

## 2018-01-01 PROCEDURE — 83605 ASSAY OF LACTIC ACID: CPT

## 2018-01-01 PROCEDURE — 1126F AMNT PAIN NOTED NONE PRSNT: CPT | Mod: S$GLB,,, | Performed by: INTERNAL MEDICINE

## 2018-01-01 PROCEDURE — 3075F SYST BP GE 130 - 139MM HG: CPT | Mod: CPTII,HCNC,S$GLB, | Performed by: INTERNAL MEDICINE

## 2018-01-01 PROCEDURE — A9552 F18 FDG: HCPCS | Mod: HCNC

## 2018-01-01 PROCEDURE — 97802 MEDICAL NUTRITION INDIV IN: CPT | Mod: S$GLB,,, | Performed by: DIETITIAN, REGISTERED

## 2018-01-01 PROCEDURE — 96365 THER/PROPH/DIAG IV INF INIT: CPT | Mod: HCNC

## 2018-01-01 PROCEDURE — 84156 ASSAY OF PROTEIN URINE: CPT

## 2018-01-01 PROCEDURE — 27201068 HC S MONOPTY BIOPSY SET: Performed by: RADIOLOGY

## 2018-01-01 PROCEDURE — 37000009 HC ANESTHESIA EA ADD 15 MINS: Performed by: INTERNAL MEDICINE

## 2018-01-01 PROCEDURE — 3079F DIAST BP 80-89 MM HG: CPT | Mod: CPTII,HCNC,S$GLB, | Performed by: INTERNAL MEDICINE

## 2018-01-01 PROCEDURE — 96365 THER/PROPH/DIAG IV INF INIT: CPT

## 2018-01-01 PROCEDURE — 93010 ELECTROCARDIOGRAM REPORT: CPT | Mod: ,,, | Performed by: INTERNAL MEDICINE

## 2018-01-01 PROCEDURE — 99152 MOD SED SAME PHYS/QHP 5/>YRS: CPT | Performed by: RADIOLOGY

## 2018-01-01 PROCEDURE — 99233 SBSQ HOSP IP/OBS HIGH 50: CPT | Mod: ,,, | Performed by: HOSPITALIST

## 2018-01-01 PROCEDURE — 84132 ASSAY OF SERUM POTASSIUM: CPT

## 2018-01-01 PROCEDURE — 45378 DIAGNOSTIC COLONOSCOPY: CPT | Mod: ,,, | Performed by: INTERNAL MEDICINE

## 2018-01-01 PROCEDURE — D9220A PRA ANESTHESIA: Mod: ANES,,, | Performed by: ANESTHESIOLOGY

## 2018-01-01 PROCEDURE — 96361 HYDRATE IV INFUSION ADD-ON: CPT | Mod: HCNC

## 2018-01-01 PROCEDURE — 93005 ELECTROCARDIOGRAM TRACING: CPT | Mod: HCNC

## 2018-01-01 PROCEDURE — 99900035 HC TECH TIME PER 15 MIN (STAT)

## 2018-01-01 PROCEDURE — 3008F BODY MASS INDEX DOCD: CPT | Mod: S$GLB,,, | Performed by: INTERNAL MEDICINE

## 2018-01-01 PROCEDURE — 1126F AMNT PAIN NOTED NONE PRSNT: CPT | Mod: S$GLB,,, | Performed by: NURSE PRACTITIONER

## 2018-01-01 PROCEDURE — 87086 URINE CULTURE/COLONY COUNT: CPT

## 2018-01-01 PROCEDURE — 96367 TX/PROPH/DG ADDL SEQ IV INF: CPT

## 2018-01-01 PROCEDURE — 3077F SYST BP >= 140 MM HG: CPT | Mod: CPTII,,, | Performed by: INTERNAL MEDICINE

## 2018-01-01 RX ORDER — SODIUM CHLORIDE 0.9 % (FLUSH) 0.9 %
10 SYRINGE (ML) INJECTION
Status: DISCONTINUED | OUTPATIENT
Start: 2018-01-01 | End: 2018-01-01 | Stop reason: HOSPADM

## 2018-01-01 RX ORDER — POLYETHYLENE GLYCOL 3350 17 G/17G
17 POWDER, FOR SOLUTION ORAL DAILY
Status: DISCONTINUED | OUTPATIENT
Start: 2018-01-01 | End: 2018-01-01 | Stop reason: HOSPADM

## 2018-01-01 RX ORDER — OMEPRAZOLE 40 MG/1
40 CAPSULE, DELAYED RELEASE ORAL DAILY
Qty: 30 CAPSULE | Refills: 3 | Status: SHIPPED | OUTPATIENT
Start: 2018-01-01 | End: 2018-01-01 | Stop reason: SDUPTHER

## 2018-01-01 RX ORDER — OMEPRAZOLE 20 MG/1
CAPSULE, DELAYED RELEASE ORAL
Qty: 90 CAPSULE | Refills: 3 | Status: SHIPPED | OUTPATIENT
Start: 2018-01-01 | End: 2018-01-01

## 2018-01-01 RX ORDER — LIDOCAINE HCL/PF 100 MG/5ML
SYRINGE (ML) INTRAVENOUS
Status: DISCONTINUED | OUTPATIENT
Start: 2018-01-01 | End: 2018-01-01

## 2018-01-01 RX ORDER — SODIUM CHLORIDE 9 MG/ML
INJECTION, SOLUTION INTRAVENOUS CONTINUOUS
Status: CANCELLED
Start: 2018-01-01

## 2018-01-01 RX ORDER — HYDROCODONE BITARTRATE AND ACETAMINOPHEN 5; 325 MG/1; MG/1
1 TABLET ORAL EVERY 4 HOURS PRN
Status: DISCONTINUED | OUTPATIENT
Start: 2018-01-01 | End: 2018-01-01 | Stop reason: HOSPADM

## 2018-01-01 RX ORDER — TRAMADOL HYDROCHLORIDE 50 MG/1
50 TABLET ORAL EVERY 6 HOURS PRN
Qty: 90 TABLET | Refills: 0 | Status: SHIPPED | OUTPATIENT
Start: 2018-01-01 | End: 2018-01-01 | Stop reason: SDUPTHER

## 2018-01-01 RX ORDER — AMLODIPINE BESYLATE 5 MG/1
5 TABLET ORAL DAILY
Status: DISCONTINUED | OUTPATIENT
Start: 2018-01-01 | End: 2018-01-01 | Stop reason: HOSPADM

## 2018-01-01 RX ORDER — HYDROCODONE BITARTRATE AND ACETAMINOPHEN 5; 325 MG/1; MG/1
1 TABLET ORAL EVERY 6 HOURS PRN
Qty: 90 TABLET | Refills: 0 | Status: SHIPPED | OUTPATIENT
Start: 2018-01-01

## 2018-01-01 RX ORDER — SODIUM CHLORIDE 9 MG/ML
INJECTION, SOLUTION INTRAVENOUS CONTINUOUS
Status: ACTIVE | OUTPATIENT
Start: 2018-01-01 | End: 2018-01-01

## 2018-01-01 RX ORDER — FENTANYL CITRATE 50 UG/ML
INJECTION, SOLUTION INTRAMUSCULAR; INTRAVENOUS
Status: DISCONTINUED | OUTPATIENT
Start: 2018-01-01 | End: 2018-01-01 | Stop reason: HOSPADM

## 2018-01-01 RX ORDER — MAGNESIUM SULFATE HEPTAHYDRATE 40 MG/ML
2 INJECTION, SOLUTION INTRAVENOUS
Status: COMPLETED | OUTPATIENT
Start: 2018-01-01 | End: 2018-01-01

## 2018-01-01 RX ORDER — SODIUM CHLORIDE 9 MG/ML
INJECTION, SOLUTION INTRAVENOUS CONTINUOUS
Status: DISCONTINUED | OUTPATIENT
Start: 2018-01-01 | End: 2018-01-01 | Stop reason: HOSPADM

## 2018-01-01 RX ORDER — HEPARIN 100 UNIT/ML
500 SYRINGE INTRAVENOUS
Status: CANCELLED | OUTPATIENT
Start: 2018-01-01

## 2018-01-01 RX ORDER — OMEPRAZOLE 20 MG/1
CAPSULE, DELAYED RELEASE ORAL
Qty: 90 CAPSULE | Refills: 3 | Status: SHIPPED | OUTPATIENT
Start: 2018-01-01 | End: 2018-01-01 | Stop reason: SDUPTHER

## 2018-01-01 RX ORDER — METRONIDAZOLE 500 MG/1
500 TABLET ORAL EVERY 8 HOURS
Status: DISCONTINUED | OUTPATIENT
Start: 2018-01-01 | End: 2018-01-01 | Stop reason: HOSPADM

## 2018-01-01 RX ORDER — ZOLPIDEM TARTRATE 5 MG/1
5 TABLET ORAL
Status: COMPLETED | OUTPATIENT
Start: 2018-01-01 | End: 2018-01-01

## 2018-01-01 RX ORDER — ZOLPIDEM TARTRATE 5 MG/1
TABLET ORAL
Qty: 90 TABLET | Refills: 0 | OUTPATIENT
Start: 2018-01-01

## 2018-01-01 RX ORDER — TAMSULOSIN HYDROCHLORIDE 0.4 MG/1
CAPSULE ORAL
Qty: 90 CAPSULE | Refills: 3 | Status: SHIPPED | OUTPATIENT
Start: 2018-01-01 | End: 2018-01-01 | Stop reason: SDUPTHER

## 2018-01-01 RX ORDER — SODIUM CHLORIDE 0.9 % (FLUSH) 0.9 %
10 SYRINGE (ML) INJECTION
Status: CANCELLED | OUTPATIENT
Start: 2018-01-01

## 2018-01-01 RX ORDER — OMEPRAZOLE 40 MG/1
CAPSULE, DELAYED RELEASE ORAL
Qty: 30 CAPSULE | Refills: 0 | Status: SHIPPED | OUTPATIENT
Start: 2018-01-01 | End: 2018-01-01 | Stop reason: SDUPTHER

## 2018-01-01 RX ORDER — ACETAMINOPHEN AND CODEINE PHOSPHATE 300; 30 MG/1; MG/1
1 TABLET ORAL EVERY 12 HOURS PRN
Qty: 60 TABLET | Refills: 0 | Status: SHIPPED | OUTPATIENT
Start: 2018-01-01 | End: 2018-01-01 | Stop reason: SDUPTHER

## 2018-01-01 RX ORDER — SODIUM CHLORIDE, SODIUM LACTATE, POTASSIUM CHLORIDE, CALCIUM CHLORIDE 600; 310; 30; 20 MG/100ML; MG/100ML; MG/100ML; MG/100ML
INJECTION, SOLUTION INTRAVENOUS CONTINUOUS PRN
Status: DISCONTINUED | OUTPATIENT
Start: 2018-01-01 | End: 2018-01-01

## 2018-01-01 RX ORDER — HEPARIN 100 UNIT/ML
500 SYRINGE INTRAVENOUS
Status: DISCONTINUED | OUTPATIENT
Start: 2018-01-01 | End: 2018-01-01 | Stop reason: HOSPADM

## 2018-01-01 RX ORDER — SODIUM BICARBONATE 1 MEQ/ML
50 SYRINGE (ML) INTRAVENOUS
Status: COMPLETED | OUTPATIENT
Start: 2018-01-01 | End: 2018-01-01

## 2018-01-01 RX ORDER — FENTANYL CITRATE 50 UG/ML
25 INJECTION, SOLUTION INTRAMUSCULAR; INTRAVENOUS EVERY 5 MIN PRN
Status: DISCONTINUED | OUTPATIENT
Start: 2018-01-01 | End: 2018-01-01 | Stop reason: HOSPADM

## 2018-01-01 RX ORDER — FAMOTIDINE 10 MG/ML
20 INJECTION INTRAVENOUS
Status: COMPLETED | OUTPATIENT
Start: 2018-01-01 | End: 2018-01-01

## 2018-01-01 RX ORDER — ALPRAZOLAM 0.25 MG/1
0.25 TABLET ORAL 2 TIMES DAILY PRN
Qty: 30 TABLET | Refills: 0 | Status: SHIPPED | OUTPATIENT
Start: 2018-01-01 | End: 2019-09-14

## 2018-01-01 RX ORDER — DRONABINOL 2.5 MG/1
5 CAPSULE ORAL
Qty: 120 CAPSULE | Refills: 0 | Status: SHIPPED | OUTPATIENT
Start: 2018-01-01

## 2018-01-01 RX ORDER — TRAMADOL HYDROCHLORIDE 50 MG/1
50 TABLET ORAL EVERY 6 HOURS PRN
Qty: 60 TABLET | Refills: 0 | Status: SHIPPED | OUTPATIENT
Start: 2018-01-01 | End: 2018-01-01

## 2018-01-01 RX ORDER — CIPROFLOXACIN 2 MG/ML
400 INJECTION, SOLUTION INTRAVENOUS
Status: DISCONTINUED | OUTPATIENT
Start: 2018-01-01 | End: 2018-01-01 | Stop reason: HOSPADM

## 2018-01-01 RX ORDER — HYDROCODONE BITARTRATE AND ACETAMINOPHEN 5; 325 MG/1; MG/1
1 TABLET ORAL EVERY 6 HOURS PRN
Status: DISCONTINUED | OUTPATIENT
Start: 2018-01-01 | End: 2018-01-01 | Stop reason: HOSPADM

## 2018-01-01 RX ORDER — FERROUS SULFATE 325(65) MG
325 TABLET ORAL 2 TIMES DAILY
Qty: 60 TABLET | Refills: 3 | Status: SHIPPED | OUTPATIENT
Start: 2018-01-01 | End: 2018-01-01 | Stop reason: SDUPTHER

## 2018-01-01 RX ORDER — HYDRALAZINE HYDROCHLORIDE 20 MG/ML
10 INJECTION INTRAMUSCULAR; INTRAVENOUS EVERY 8 HOURS PRN
Status: DISCONTINUED | OUTPATIENT
Start: 2018-01-01 | End: 2018-01-01 | Stop reason: HOSPADM

## 2018-01-01 RX ORDER — METRONIDAZOLE 500 MG/1
500 TABLET ORAL EVERY 8 HOURS
Qty: 15 TABLET | Refills: 0 | Status: SHIPPED | OUTPATIENT
Start: 2018-01-01 | End: 2018-01-01

## 2018-01-01 RX ORDER — ACETAMINOPHEN AND CODEINE PHOSPHATE 300; 30 MG/1; MG/1
TABLET ORAL
Qty: 60 TABLET | Refills: 0 | OUTPATIENT
Start: 2018-01-01

## 2018-01-01 RX ORDER — CIPROFLOXACIN 500 MG/1
500 TABLET ORAL 2 TIMES DAILY
Qty: 10 TABLET | Refills: 0 | Status: SHIPPED | OUTPATIENT
Start: 2018-01-01 | End: 2018-01-01

## 2018-01-01 RX ORDER — VIT C/E/ZN/COPPR/LUTEIN/ZEAXAN 250MG-90MG
1000 CAPSULE ORAL DAILY
Qty: 90 CAPSULE | Refills: 3 | Status: SHIPPED | OUTPATIENT
Start: 2018-01-01 | End: 2019-02-19

## 2018-01-01 RX ORDER — TAMSULOSIN HYDROCHLORIDE 0.4 MG/1
0.4 CAPSULE ORAL DAILY
Status: DISCONTINUED | OUTPATIENT
Start: 2018-01-01 | End: 2018-01-01 | Stop reason: HOSPADM

## 2018-01-01 RX ORDER — ZOLPIDEM TARTRATE 5 MG/1
5 TABLET ORAL NIGHTLY PRN
Qty: 90 TABLET | Refills: 1 | Status: SHIPPED | OUTPATIENT
Start: 2018-01-01

## 2018-01-01 RX ORDER — PROPOFOL 10 MG/ML
VIAL (ML) INTRAVENOUS
Status: DISCONTINUED | OUTPATIENT
Start: 2018-01-01 | End: 2018-01-01

## 2018-01-01 RX ORDER — TRAMADOL HYDROCHLORIDE 50 MG/1
50 TABLET ORAL EVERY 6 HOURS PRN
COMMUNITY
End: 2018-01-01 | Stop reason: SDUPTHER

## 2018-01-01 RX ORDER — CLOPIDOGREL BISULFATE 75 MG/1
TABLET ORAL
Qty: 90 TABLET | Refills: 3 | Status: SHIPPED | OUTPATIENT
Start: 2018-01-01 | End: 2018-01-01 | Stop reason: SDUPTHER

## 2018-01-01 RX ORDER — CHOLECALCIFEROL (VITAMIN D3) 25 MCG
TABLET ORAL
Qty: 90 TABLET | Refills: 3 | Status: SHIPPED | OUTPATIENT
Start: 2018-01-01

## 2018-01-01 RX ORDER — BENAZEPRIL HYDROCHLORIDE 20 MG/1
20 TABLET ORAL DAILY
Qty: 30 TABLET | Refills: 11 | Status: SHIPPED | OUTPATIENT
Start: 2018-01-01 | End: 2018-01-01

## 2018-01-01 RX ORDER — HYOSCYAMINE SULFATE 0.5 MG/ML
0.5 INJECTION, SOLUTION SUBCUTANEOUS
Status: COMPLETED | OUTPATIENT
Start: 2018-01-01 | End: 2018-01-01

## 2018-01-01 RX ORDER — PRAVASTATIN SODIUM 40 MG/1
TABLET ORAL
Qty: 90 TABLET | Refills: 3 | Status: SHIPPED | OUTPATIENT
Start: 2018-01-01

## 2018-01-01 RX ORDER — PRAVASTATIN SODIUM 20 MG/1
40 TABLET ORAL DAILY
Status: DISCONTINUED | OUTPATIENT
Start: 2018-01-01 | End: 2018-01-01 | Stop reason: HOSPADM

## 2018-01-01 RX ORDER — BENAZEPRIL HYDROCHLORIDE 40 MG/1
40 TABLET ORAL DAILY
Qty: 30 TABLET | Refills: 11 | Status: SHIPPED | OUTPATIENT
Start: 2018-01-01 | End: 2019-09-11

## 2018-01-01 RX ORDER — PROPOFOL 10 MG/ML
VIAL (ML) INTRAVENOUS CONTINUOUS PRN
Status: DISCONTINUED | OUTPATIENT
Start: 2018-01-01 | End: 2018-01-01

## 2018-01-01 RX ORDER — ONDANSETRON 8 MG/1
8 TABLET, ORALLY DISINTEGRATING ORAL EVERY 8 HOURS PRN
Status: DISCONTINUED | OUTPATIENT
Start: 2018-01-01 | End: 2018-01-01 | Stop reason: HOSPADM

## 2018-01-01 RX ORDER — AMLODIPINE BESYLATE 5 MG/1
10 TABLET ORAL ONCE
Status: DISCONTINUED | OUTPATIENT
Start: 2018-01-01 | End: 2018-01-01

## 2018-01-01 RX ORDER — CYCLOBENZAPRINE HCL 5 MG
5 TABLET ORAL 3 TIMES DAILY PRN
Qty: 30 TABLET | Refills: 0 | Status: SHIPPED | OUTPATIENT
Start: 2018-01-01 | End: 2018-01-01

## 2018-01-01 RX ORDER — DRONABINOL 2.5 MG/1
2.5 CAPSULE ORAL
Qty: 60 CAPSULE | Refills: 0 | Status: SHIPPED | OUTPATIENT
Start: 2018-01-01 | End: 2018-01-01 | Stop reason: SDUPTHER

## 2018-01-01 RX ORDER — CLONIDINE HYDROCHLORIDE 0.1 MG/1
0.1 TABLET ORAL
Status: COMPLETED | OUTPATIENT
Start: 2018-01-01 | End: 2018-01-01

## 2018-01-01 RX ORDER — METOPROLOL SUCCINATE 50 MG/1
50 TABLET, EXTENDED RELEASE ORAL DAILY
Status: DISCONTINUED | OUTPATIENT
Start: 2018-01-01 | End: 2018-01-01 | Stop reason: HOSPADM

## 2018-01-01 RX ORDER — ZOLPIDEM TARTRATE 5 MG/1
TABLET ORAL
Qty: 90 TABLET | Refills: 1 | Status: SHIPPED | OUTPATIENT
Start: 2018-01-01 | End: 2018-01-01 | Stop reason: SDUPTHER

## 2018-01-01 RX ORDER — CIPROFLOXACIN 2 MG/ML
400 INJECTION, SOLUTION INTRAVENOUS
Status: DISCONTINUED | OUTPATIENT
Start: 2018-01-01 | End: 2018-01-01

## 2018-01-01 RX ORDER — BENAZEPRIL HYDROCHLORIDE 10 MG/1
20 TABLET ORAL ONCE
Status: COMPLETED | OUTPATIENT
Start: 2018-01-01 | End: 2018-01-01

## 2018-01-01 RX ORDER — ONDANSETRON 4 MG/1
4 TABLET, ORALLY DISINTEGRATING ORAL EVERY 6 HOURS PRN
Qty: 12 TABLET | Refills: 0 | Status: SHIPPED | OUTPATIENT
Start: 2018-01-01

## 2018-01-01 RX ORDER — ACETAMINOPHEN AND CODEINE PHOSPHATE 300; 30 MG/1; MG/1
TABLET ORAL
Qty: 60 TABLET | Refills: 0 | Status: SHIPPED | OUTPATIENT
Start: 2018-01-01

## 2018-01-01 RX ORDER — TAMSULOSIN HYDROCHLORIDE 0.4 MG/1
CAPSULE ORAL
Qty: 90 CAPSULE | Refills: 3 | Status: SHIPPED | OUTPATIENT
Start: 2018-01-01

## 2018-01-01 RX ORDER — PROMETHAZINE HYDROCHLORIDE 25 MG/1
25 TABLET ORAL EVERY 4 HOURS
Qty: 120 TABLET | Refills: 3 | Status: SHIPPED | OUTPATIENT
Start: 2018-01-01

## 2018-01-01 RX ORDER — NAPROXEN SODIUM 220 MG/1
81 TABLET, FILM COATED ORAL DAILY
Status: DISCONTINUED | OUTPATIENT
Start: 2018-01-01 | End: 2018-01-01 | Stop reason: HOSPADM

## 2018-01-01 RX ORDER — SODIUM CHLORIDE 9 MG/ML
1000 INJECTION, SOLUTION INTRAVENOUS
Status: COMPLETED | OUTPATIENT
Start: 2018-01-01 | End: 2018-01-01

## 2018-01-01 RX ORDER — MIDAZOLAM HYDROCHLORIDE 1 MG/ML
INJECTION INTRAMUSCULAR; INTRAVENOUS
Status: DISCONTINUED | OUTPATIENT
Start: 2018-01-01 | End: 2018-01-01 | Stop reason: HOSPADM

## 2018-01-01 RX ORDER — ZOLPIDEM TARTRATE 5 MG/1
5 TABLET ORAL NIGHTLY PRN
Qty: 90 TABLET | Refills: 1 | Status: SHIPPED | OUTPATIENT
Start: 2018-01-01 | End: 2018-01-01 | Stop reason: SDUPTHER

## 2018-01-01 RX ORDER — SODIUM CHLORIDE 0.9 % (FLUSH) 0.9 %
5 SYRINGE (ML) INJECTION
Status: DISCONTINUED | OUTPATIENT
Start: 2018-01-01 | End: 2018-01-01 | Stop reason: HOSPADM

## 2018-01-01 RX ORDER — SODIUM CHLORIDE 0.9 % (FLUSH) 0.9 %
3 SYRINGE (ML) INJECTION
Status: DISCONTINUED | OUTPATIENT
Start: 2018-01-01 | End: 2018-01-01 | Stop reason: HOSPADM

## 2018-01-01 RX ORDER — AMLODIPINE BESYLATE 10 MG/1
10 TABLET ORAL DAILY
Qty: 30 TABLET | Refills: 11 | Status: SHIPPED | OUTPATIENT
Start: 2018-01-01 | End: 2019-09-13

## 2018-01-01 RX ORDER — DRONABINOL 2.5 MG/1
5 CAPSULE ORAL
Qty: 120 CAPSULE | Refills: 0 | Status: SHIPPED | OUTPATIENT
Start: 2018-01-01 | End: 2018-01-01 | Stop reason: SDUPTHER

## 2018-01-01 RX ORDER — ACETAMINOPHEN 325 MG/1
650 TABLET ORAL EVERY 4 HOURS PRN
Status: DISCONTINUED | OUTPATIENT
Start: 2018-01-01 | End: 2018-01-01 | Stop reason: HOSPADM

## 2018-01-01 RX ORDER — POLYETHYLENE GLYCOL 3350, SODIUM SULFATE ANHYDROUS, SODIUM BICARBONATE, SODIUM CHLORIDE, POTASSIUM CHLORIDE 236; 22.74; 6.74; 5.86; 2.97 G/4L; G/4L; G/4L; G/4L; G/4L
4 POWDER, FOR SOLUTION ORAL ONCE
Qty: 4000 ML | Refills: 0 | Status: SHIPPED | OUTPATIENT
Start: 2018-01-01 | End: 2018-01-01

## 2018-01-01 RX ORDER — MEPERIDINE HYDROCHLORIDE 50 MG/ML
12.5 INJECTION INTRAMUSCULAR; INTRAVENOUS; SUBCUTANEOUS ONCE AS NEEDED
Status: ACTIVE | OUTPATIENT
Start: 2018-01-01 | End: 2018-01-01

## 2018-01-01 RX ORDER — FERROUS SULFATE 325(65) MG
TABLET ORAL
Qty: 60 TABLET | Refills: 0 | Status: SHIPPED | OUTPATIENT
Start: 2018-01-01

## 2018-01-01 RX ORDER — ONDANSETRON 2 MG/ML
4 INJECTION INTRAMUSCULAR; INTRAVENOUS DAILY PRN
Status: DISCONTINUED | OUTPATIENT
Start: 2018-01-01 | End: 2018-01-01 | Stop reason: HOSPADM

## 2018-01-01 RX ORDER — RAMELTEON 8 MG/1
8 TABLET ORAL NIGHTLY PRN
Status: DISCONTINUED | OUTPATIENT
Start: 2018-01-01 | End: 2018-01-01 | Stop reason: HOSPADM

## 2018-01-01 RX ORDER — CLOPIDOGREL BISULFATE 75 MG/1
75 TABLET ORAL DAILY
Status: DISCONTINUED | OUTPATIENT
Start: 2018-01-01 | End: 2018-01-01 | Stop reason: HOSPADM

## 2018-01-01 RX ORDER — FERROUS SULFATE 325(65) MG
TABLET ORAL
Qty: 60 TABLET | Refills: 0 | Status: SHIPPED | OUTPATIENT
Start: 2018-01-01 | End: 2018-01-01 | Stop reason: SDUPTHER

## 2018-01-01 RX ORDER — CLOPIDOGREL BISULFATE 75 MG/1
75 TABLET ORAL DAILY
Qty: 90 TABLET | Refills: 3 | Status: SHIPPED | OUTPATIENT
Start: 2018-01-01

## 2018-01-01 RX ORDER — AMLODIPINE BESYLATE 5 MG/1
5 TABLET ORAL DAILY PRN
Qty: 90 TABLET | Refills: 3
Start: 2018-01-01 | End: 2018-01-01

## 2018-01-01 RX ORDER — ONDANSETRON 4 MG/1
4 TABLET, FILM COATED ORAL EVERY 6 HOURS PRN
Qty: 120 TABLET | Refills: 3 | Status: SHIPPED | OUTPATIENT
Start: 2018-01-01

## 2018-01-01 RX ORDER — TRAMADOL HYDROCHLORIDE 50 MG/1
TABLET ORAL
Qty: 90 TABLET | Refills: 0 | Status: SHIPPED | OUTPATIENT
Start: 2018-01-01

## 2018-01-01 RX ORDER — OMEPRAZOLE 40 MG/1
CAPSULE, DELAYED RELEASE ORAL
Qty: 30 CAPSULE | Refills: 0 | Status: SHIPPED | OUTPATIENT
Start: 2018-01-01

## 2018-01-01 RX ORDER — OXYCODONE HYDROCHLORIDE 5 MG/1
5 TABLET ORAL
Status: DISCONTINUED | OUTPATIENT
Start: 2018-01-01 | End: 2018-01-01 | Stop reason: HOSPADM

## 2018-01-01 RX ADMIN — SODIUM CHLORIDE: 0.9 INJECTION, SOLUTION INTRAVENOUS at 05:03

## 2018-01-01 RX ADMIN — PROPOFOL 20 MG: 10 INJECTION, EMULSION INTRAVENOUS at 08:03

## 2018-01-01 RX ADMIN — SODIUM CHLORIDE: 0.9 INJECTION, SOLUTION INTRAVENOUS at 09:10

## 2018-01-01 RX ADMIN — ZOLPIDEM TARTRATE 5 MG: 5 TABLET, FILM COATED ORAL at 02:03

## 2018-01-01 RX ADMIN — SODIUM CHLORIDE 200 MG: 9 INJECTION, SOLUTION INTRAVENOUS at 09:05

## 2018-01-01 RX ADMIN — SODIUM CHLORIDE: 0.9 INJECTION, SOLUTION INTRAVENOUS at 10:10

## 2018-01-01 RX ADMIN — SODIUM CHLORIDE: 0.9 INJECTION, SOLUTION INTRAVENOUS at 03:03

## 2018-01-01 RX ADMIN — POLYETHYLENE GLYCOL 3350 17 G: 17 POWDER, FOR SOLUTION ORAL at 08:03

## 2018-01-01 RX ADMIN — PRAVASTATIN SODIUM 40 MG: 20 TABLET ORAL at 09:03

## 2018-01-01 RX ADMIN — HYDROCODONE BITARTRATE AND ACETAMINOPHEN 1 TABLET: 5; 325 TABLET ORAL at 12:04

## 2018-01-01 RX ADMIN — LIDOCAINE HYDROCHLORIDE 50 MG: 20 INJECTION, SOLUTION INTRAVENOUS at 08:03

## 2018-01-01 RX ADMIN — SODIUM CHLORIDE: 0.9 INJECTION, SOLUTION INTRAVENOUS at 09:07

## 2018-01-01 RX ADMIN — SODIUM CHLORIDE: 0.9 INJECTION, SOLUTION INTRAVENOUS at 11:07

## 2018-01-01 RX ADMIN — SODIUM CHLORIDE: 9 INJECTION, SOLUTION INTRAVENOUS at 09:03

## 2018-01-01 RX ADMIN — DEXTROSE MONOHYDRATE 25 G: 25 INJECTION, SOLUTION INTRAVENOUS at 12:03

## 2018-01-01 RX ADMIN — SODIUM CHLORIDE: 0.9 INJECTION, SOLUTION INTRAVENOUS at 08:10

## 2018-01-01 RX ADMIN — SODIUM CHLORIDE: 0.9 INJECTION, SOLUTION INTRAVENOUS at 08:05

## 2018-01-01 RX ADMIN — PROPOFOL 100 MCG/KG/MIN: 10 INJECTION, EMULSION INTRAVENOUS at 10:03

## 2018-01-01 RX ADMIN — SODIUM CHLORIDE: 0.9 INJECTION, SOLUTION INTRAVENOUS at 11:09

## 2018-01-01 RX ADMIN — SODIUM CHLORIDE 200 MG: 9 INJECTION, SOLUTION INTRAVENOUS at 10:10

## 2018-01-01 RX ADMIN — AMLODIPINE BESYLATE 5 MG: 5 TABLET ORAL at 08:03

## 2018-01-01 RX ADMIN — METRONIDAZOLE 500 MG: 500 TABLET ORAL at 04:03

## 2018-01-01 RX ADMIN — AMLODIPINE BESYLATE 5 MG: 5 TABLET ORAL at 09:03

## 2018-01-01 RX ADMIN — CLOPIDOGREL BISULFATE 75 MG: 75 TABLET, FILM COATED ORAL at 10:03

## 2018-01-01 RX ADMIN — SODIUM CHLORIDE 200 MG: 9 INJECTION, SOLUTION INTRAVENOUS at 09:10

## 2018-01-01 RX ADMIN — CLOPIDOGREL BISULFATE 75 MG: 75 TABLET, FILM COATED ORAL at 08:03

## 2018-01-01 RX ADMIN — CALCIUM GLUCONATE 1 G: 94 INJECTION, SOLUTION INTRAVENOUS at 11:03

## 2018-01-01 RX ADMIN — CIPROFLOXACIN 400 MG: 2 INJECTION, SOLUTION INTRAVENOUS at 05:03

## 2018-01-01 RX ADMIN — TAMSULOSIN HYDROCHLORIDE 0.4 MG: 0.4 CAPSULE ORAL at 10:03

## 2018-01-01 RX ADMIN — SODIUM CHLORIDE: 0.9 INJECTION, SOLUTION INTRAVENOUS at 09:06

## 2018-01-01 RX ADMIN — SODIUM CHLORIDE: 0.9 INJECTION, SOLUTION INTRAVENOUS at 10:08

## 2018-01-01 RX ADMIN — SODIUM CHLORIDE 200 MG: 9 INJECTION, SOLUTION INTRAVENOUS at 10:08

## 2018-01-01 RX ADMIN — METRONIDAZOLE 500 MG: 500 TABLET ORAL at 05:03

## 2018-01-01 RX ADMIN — SODIUM CHLORIDE: 0.9 INJECTION, SOLUTION INTRAVENOUS at 10:11

## 2018-01-01 RX ADMIN — ASPIRIN 81 MG CHEWABLE TABLET 81 MG: 81 TABLET CHEWABLE at 09:03

## 2018-01-01 RX ADMIN — RAMELTEON 8 MG: 8 TABLET, FILM COATED ORAL at 11:03

## 2018-01-01 RX ADMIN — SODIUM CHLORIDE: 0.9 INJECTION, SOLUTION INTRAVENOUS at 08:08

## 2018-01-01 RX ADMIN — SODIUM CHLORIDE: 0.9 INJECTION, SOLUTION INTRAVENOUS at 08:11

## 2018-01-01 RX ADMIN — SODIUM CHLORIDE: 0.9 INJECTION, SOLUTION INTRAVENOUS at 07:03

## 2018-01-01 RX ADMIN — SODIUM CHLORIDE 500 ML: 0.9 INJECTION, SOLUTION INTRAVENOUS at 02:08

## 2018-01-01 RX ADMIN — METRONIDAZOLE 500 MG: 500 TABLET ORAL at 06:03

## 2018-01-01 RX ADMIN — HYDROCODONE BITARTRATE AND ACETAMINOPHEN 1 TABLET: 5; 325 TABLET ORAL at 08:03

## 2018-01-01 RX ADMIN — SODIUM CHLORIDE 200 MG: 9 INJECTION, SOLUTION INTRAVENOUS at 12:07

## 2018-01-01 RX ADMIN — SODIUM CHLORIDE: 0.9 INJECTION, SOLUTION INTRAVENOUS at 10:06

## 2018-01-01 RX ADMIN — CLOPIDOGREL BISULFATE 75 MG: 75 TABLET, FILM COATED ORAL at 09:03

## 2018-01-01 RX ADMIN — METOPROLOL SUCCINATE 50 MG: 50 TABLET, EXTENDED RELEASE ORAL at 08:03

## 2018-01-01 RX ADMIN — METRONIDAZOLE 500 MG: 500 TABLET ORAL at 02:03

## 2018-01-01 RX ADMIN — SODIUM CHLORIDE: 0.9 INJECTION, SOLUTION INTRAVENOUS at 08:07

## 2018-01-01 RX ADMIN — SODIUM CHLORIDE: 0.9 INJECTION, SOLUTION INTRAVENOUS at 09:08

## 2018-01-01 RX ADMIN — METOPROLOL SUCCINATE 50 MG: 50 TABLET, EXTENDED RELEASE ORAL at 09:03

## 2018-01-01 RX ADMIN — HYDROCODONE BITARTRATE AND ACETAMINOPHEN 1 TABLET: 5; 325 TABLET ORAL at 03:03

## 2018-01-01 RX ADMIN — METOPROLOL SUCCINATE 50 MG: 50 TABLET, EXTENDED RELEASE ORAL at 10:03

## 2018-01-01 RX ADMIN — METRONIDAZOLE 500 MG: 500 TABLET ORAL at 08:03

## 2018-01-01 RX ADMIN — SODIUM CHLORIDE: 0.9 INJECTION, SOLUTION INTRAVENOUS at 08:03

## 2018-01-01 RX ADMIN — TAMSULOSIN HYDROCHLORIDE 0.4 MG: 0.4 CAPSULE ORAL at 08:03

## 2018-01-01 RX ADMIN — SODIUM BICARBONATE 50 MEQ: 84 INJECTION, SOLUTION INTRAVENOUS at 12:03

## 2018-01-01 RX ADMIN — TAMSULOSIN HYDROCHLORIDE 0.4 MG: 0.4 CAPSULE ORAL at 09:03

## 2018-01-01 RX ADMIN — HYOSCYAMINE SULFATE 0.5 MG: 0.5 INJECTION, SOLUTION SUBCUTANEOUS at 02:08

## 2018-01-01 RX ADMIN — SODIUM CHLORIDE 200 MG: 9 INJECTION, SOLUTION INTRAVENOUS at 12:09

## 2018-01-01 RX ADMIN — HYDROCODONE BITARTRATE AND ACETAMINOPHEN 1 TABLET: 5; 325 TABLET ORAL at 11:03

## 2018-01-01 RX ADMIN — SODIUM CHLORIDE, SODIUM LACTATE, POTASSIUM CHLORIDE, AND CALCIUM CHLORIDE: 600; 310; 30; 20 INJECTION, SOLUTION INTRAVENOUS at 08:03

## 2018-01-01 RX ADMIN — SODIUM CHLORIDE: 0.9 INJECTION, SOLUTION INTRAVENOUS at 12:09

## 2018-01-01 RX ADMIN — SODIUM CHLORIDE 200 MG: 9 INJECTION, SOLUTION INTRAVENOUS at 10:06

## 2018-01-01 RX ADMIN — PRAVASTATIN SODIUM 40 MG: 20 TABLET ORAL at 08:03

## 2018-01-01 RX ADMIN — SODIUM CHLORIDE 1000 ML: 0.9 INJECTION, SOLUTION INTRAVENOUS at 10:03

## 2018-01-01 RX ADMIN — HYDROCODONE BITARTRATE AND ACETAMINOPHEN 1 TABLET: 5; 325 TABLET ORAL at 05:03

## 2018-01-01 RX ADMIN — INSULIN HUMAN 5 UNITS: 100 INJECTION, SOLUTION PARENTERAL at 12:03

## 2018-01-01 RX ADMIN — HYDROCODONE BITARTRATE AND ACETAMINOPHEN 1 TABLET: 5; 325 TABLET ORAL at 07:03

## 2018-01-01 RX ADMIN — AMLODIPINE BESYLATE 5 MG: 5 TABLET ORAL at 10:03

## 2018-01-01 RX ADMIN — MAGNESIUM SULFATE HEPTAHYDRATE 2 G: 40 INJECTION, SOLUTION INTRAVENOUS at 10:11

## 2018-01-01 RX ADMIN — METRONIDAZOLE 500 MG: 500 TABLET ORAL at 09:03

## 2018-01-01 RX ADMIN — SODIUM CHLORIDE: 0.9 INJECTION, SOLUTION INTRAVENOUS at 08:09

## 2018-01-01 RX ADMIN — FAMOTIDINE 20 MG: 10 INJECTION, SOLUTION INTRAVENOUS at 02:08

## 2018-01-01 RX ADMIN — ACETAMINOPHEN 650 MG: 325 TABLET ORAL at 08:03

## 2018-01-01 RX ADMIN — SODIUM CHLORIDE: 0.9 INJECTION, SOLUTION INTRAVENOUS at 09:05

## 2018-01-01 RX ADMIN — SODIUM CHLORIDE: 0.9 INJECTION, SOLUTION INTRAVENOUS at 02:05

## 2018-01-01 RX ADMIN — SODIUM CHLORIDE 200 MG: 9 INJECTION, SOLUTION INTRAVENOUS at 09:07

## 2018-01-01 RX ADMIN — SODIUM CHLORIDE: 0.9 INJECTION, SOLUTION INTRAVENOUS at 01:06

## 2018-01-01 RX ADMIN — SODIUM CHLORIDE 1000 ML: 0.9 INJECTION, SOLUTION INTRAVENOUS at 12:05

## 2018-01-01 RX ADMIN — PRAVASTATIN SODIUM 40 MG: 20 TABLET ORAL at 10:03

## 2018-01-01 RX ADMIN — CLONIDINE HYDROCHLORIDE 0.1 MG: 0.1 TABLET ORAL at 04:08

## 2018-01-01 RX ADMIN — ACETAMINOPHEN 650 MG: 325 TABLET ORAL at 11:03

## 2018-01-01 RX ADMIN — CIPROFLOXACIN 400 MG: 2 INJECTION, SOLUTION INTRAVENOUS at 06:03

## 2018-01-01 RX ADMIN — ASPIRIN 81 MG CHEWABLE TABLET 81 MG: 81 TABLET CHEWABLE at 10:03

## 2018-01-01 RX ADMIN — PROPOFOL 50 MG: 10 INJECTION, EMULSION INTRAVENOUS at 10:03

## 2018-01-01 RX ADMIN — BENAZEPRIL HYDROCHLORIDE 20 MG: 10 TABLET, FILM COATED ORAL at 11:09

## 2018-01-01 RX ADMIN — ASPIRIN 81 MG CHEWABLE TABLET 81 MG: 81 TABLET CHEWABLE at 08:03

## 2018-01-03 ENCOUNTER — TELEPHONE (OUTPATIENT)
Dept: NEPHROLOGY | Facility: CLINIC | Age: 83
End: 2018-01-03

## 2018-01-03 ENCOUNTER — OFFICE VISIT (OUTPATIENT)
Dept: CARDIOLOGY | Facility: CLINIC | Age: 83
End: 2018-01-03
Payer: MEDICARE

## 2018-01-03 VITALS
HEART RATE: 70 BPM | DIASTOLIC BLOOD PRESSURE: 64 MMHG | SYSTOLIC BLOOD PRESSURE: 98 MMHG | WEIGHT: 150.81 LBS | BODY MASS INDEX: 25.75 KG/M2 | HEIGHT: 64 IN

## 2018-01-03 DIAGNOSIS — I10 BENIGN ESSENTIAL HTN: Chronic | ICD-10-CM

## 2018-01-03 DIAGNOSIS — N18.30 CKD (CHRONIC KIDNEY DISEASE) STAGE 3, GFR 30-59 ML/MIN: Chronic | ICD-10-CM

## 2018-01-03 DIAGNOSIS — I25.10 CORONARY ARTERY DISEASE INVOLVING NATIVE CORONARY ARTERY OF NATIVE HEART WITHOUT ANGINA PECTORIS: Primary | Chronic | ICD-10-CM

## 2018-01-03 PROCEDURE — 99204 OFFICE O/P NEW MOD 45 MIN: CPT | Mod: GC,S$GLB,, | Performed by: INTERNAL MEDICINE

## 2018-01-03 PROCEDURE — 99999 PR PBB SHADOW E&M-EST. PATIENT-LVL IV: CPT | Mod: PBBFAC,GC,, | Performed by: INTERNAL MEDICINE

## 2018-01-03 NOTE — TELEPHONE ENCOUNTER
----- Message from Lisa Srinivasan sent at 1/3/2018  9:28 AM CST -----  Contact: Patient's daughter, Clemencia  Patient's daughter is calling to cancel patient's appointment for today.  She is going to try to get the patient an appointment at [facility].  Call Back#[phone]  Thanks

## 2018-01-03 NOTE — PROGRESS NOTES
"     Cardiology Clinic Note  Reason for Visit: Establish Care    HPI:   Mr. Cruz is an 83 yo M with a history of CAD s/p PCI, HTN, CKD, and former tobacco use who presents to establish care.    Was previously seen at [facility] and his last PCI was in Sept 2016.  States he was told he had previous MI and had chest pain prior to his stent in 2010.      Has had 3 prior stents LCx in 1999, mRCA 2010 and mLAD in Sept 2016.  Has been taken off Plavix before, but states he "always needs another stent" when he stops it so plan was to continue plavix.    Doesn't think he's ever been told he has a weak heart and denies any symptoms of heart failure in the past.    Denies any chest pain, dyspnea, PND, orthopnea, syncope or palpitations.    ROS:    Constitution: Negative for fever or chills. Negative for weight loss or gain.   HENT: Negative for sore throat or headaches. Negative for rhinorrhea.  Eyes: Negative for blurred or double vision.   Cardiovascular: See above  Pulmonary: Negative for SOB. Negative for cough.   Gastrointestinal: Negative for abdominal pain. Negative for nausea/ vomiting. Negative for diarrhea.   : Negative for dysuria.   Neurological: Negative for focal weakness or sensory changes.  PMH:     Past Medical History:   Diagnosis Date    Age-related osteoporosis without current pathological fracture 6/6/2017    Age-related osteoporosis without current pathological fracture 6/6/2017    Benign prostatic hyperplasia 05/10/2017    Cancer     throat    Cataract     CKD (chronic kidney disease) stage 3, GFR 30-59 ml/min 5/12/2017    Constipation 5/10/2017    Coronary artery disease involving native coronary artery of native heart without angina pectoris 05/10/2017    cards/Dr. Melva Reilly    H/O heart artery stent     LAD 9/22/16 Zotarolimus eluting coronary stent system- [employer]    History of throat cancer 1997    s/p surgery, XRT    Hypertension     Insomnia     Primary osteoarthritis of right " knee 05/10/2017    sees ortho, knee replacement recommended     Past Surgical History:   Procedure Laterality Date    CATARACT EXTRACTION Right     july 2017    CORONARY ANGIOPLASTY WITH STENT PLACEMENT      LCx 1999, mid RCA 2010, LAD 9/2016; cards/Dr. Melva Reilly    THROAT SURGERY      2/2 throat cancer     Allergies:   Review of patient's allergies indicates:  No Known Allergies  Medications:     Current Outpatient Prescriptions on File Prior to Visit   Medication Sig Dispense Refill    acetaminophen-codeine 300-30mg (TYLENOL #3) 300-30 mg Tab TAKE 1 TABLET BY MOUTH THREE TIMES DAILY AS NEEDED 60 tablet 0    amlodipine (NORVASC) 10 MG tablet Take 1 tablet (10 mg total) by mouth once daily. 90 tablet 3    aspirin 81 MG Chew Take 81 mg by mouth once daily.      benazepril (LOTENSIN) 20 MG tablet Take 1 tablet (20 mg total) by mouth once daily. 90 tablet 3    cholecalciferol, vitamin D3, 1,000 unit capsule Take 1 capsule (1,000 Units total) by mouth once daily. 30 capsule 1    clopidogrel (PLAVIX) 75 mg tablet Take 1 tablet (75 mg total) by mouth once daily. 90 tablet 3    metoprolol succinate (TOPROL-XL) 50 MG 24 hr tablet Take 1 tablet (50 mg total) by mouth once daily. 90 tablet 3    omeprazole (PRILOSEC) 20 MG capsule TAKE 1 CAPSULE BY MOUTH ONCE DAILY AT 6 AM. 90 capsule 2    polyethylene glycol (GLYCOLAX) 17 gram/dose powder Take 17 g by mouth once daily. 1 Bottle 6    pravastatin (PRAVACHOL) 40 MG tablet Take 1 tablet (40 mg total) by mouth once daily. 90 tablet 3    tamsulosin (FLOMAX) 0.4 mg Cp24 Take 1 capsule (0.4 mg total) by mouth once daily. 90 capsule 3    zolpidem (AMBIEN CR) 6.25 MG CR tablet TAKE 1 TABLET(6.25 MG) BY MOUTH EVERY NIGHT AS NEEDED FOR INSOMNIA 30 tablet 5     No current facility-administered medications on file prior to visit.      Social History:     Social History   Substance Use Topics    Smoking status: Former Smoker     Years: 50.00     Types: Cigarettes     "Smokeless tobacco: Never Used    Alcohol use No     Family History:     Family History   Problem Relation Age of Onset    Cancer Mother      stomach    Hypertension Mother     Hypertension Daughter     Hypertension Son     Hypertension Daughter     Blindness Neg Hx     Amblyopia Neg Hx     Cataracts Neg Hx     Glaucoma Neg Hx     Macular degeneration Neg Hx     Retinal detachment Neg Hx     Strabismus Neg Hx     Diabetes Neg Hx     Stroke Neg Hx     Thyroid disease Neg Hx      Physical Exam:   BP 98/64 (BP Location: Left arm, Patient Position: Sitting, BP Method: Medium (Automatic))   Pulse 70   Ht 5' 4" (1.626 m)   Wt 68.4 kg (150 lb 12.7 oz)   BMI 25.88 kg/m²      Constitutional: No acute distress, conversant  HEENT: Sclera anicteric, Pupils equal, round and reactive to light, extraocular motions intact, Oropharynx clear  Neck: No JVD, no carotid bruits  Cardiovascular: regular rate and rhythm, no murmur, rubs or gallops, normal S1/S2  Pulmonary: Clear to auscultation bilaterally  Abdominal: Abdomen soft, nontender, nondistended, positive bowel sounds  Extremities: No lower extremity edema, warm with palpable pulses  Skin: No ecchymosis, erythema, or ulcers  Psych: Alert and oriented x 3, appropriate affect  Neuro: CNII-XII intact, no focal deficits    Labs:     Lab Results   Component Value Date     12/21/2017     12/21/2017    K 4.5 12/21/2017    K 4.5 12/21/2017     12/21/2017     12/21/2017    CO2 21 (L) 12/21/2017    CO2 21 (L) 12/21/2017    BUN 16 12/21/2017    BUN 16 12/21/2017    CREATININE 1.8 (H) 12/21/2017    CREATININE 1.8 (H) 12/21/2017    ANIONGAP 8 12/21/2017    ANIONGAP 8 12/21/2017     Lab Results   Component Value Date    AST 20 12/21/2017    ALT 14 12/21/2017    ALKPHOS 86 12/21/2017    BILITOT 0.7 12/21/2017    ALBUMIN 3.7 12/21/2017     Lab Results   Component Value Date    CALCIUM 8.7 12/21/2017    CALCIUM 8.7 12/21/2017    MG 1.9 12/21/2017    PHOS " 2.2 (L) 12/21/2017     No results found for: BNP, BNPTRIAGEBLO Lab Results   Component Value Date    WBC 11.94 12/21/2017    HGB 13.2 (L) 12/21/2017    HCT 41.6 12/21/2017     (H) 12/21/2017    GRAN 9.6 (H) 12/21/2017    GRAN 80.5 (H) 12/21/2017     No results found for: PTT, INR  Lab Results   Component Value Date    CHOL 132 05/10/2017    HDL 37 (L) 05/10/2017    LDLCALC 69.4 05/10/2017    TRIG 128 05/10/2017     Lab Results   Component Value Date    HGBA1C 5.5 12/07/2017     Lab Results   Component Value Date    TSH 1.543 10/03/2017          Imaging:     No results found for: EF    EKG: NSR, LAD, no evidence of ischemia or prior infarct.  Assessment:   In summary, Mr. Cruz is an 81 yo M with a history of CAD s/p PCI, HTN, CKD, and former tobacco use who presents to establish care.    Plan:   #CAD: Stable with no current symptoms.  Will obtain prior cardiology records.  Continue ASA, statin, BB.    #HTN: At goal. Continue current medications.    #HLD: Last lipid panel with LDL of 69.  Continue pravastatin 40 mg daily.    RTC in one year, will obtain records from prior cardiologist.    Signed:  Liberty Morgan MD, MPH  Cardiology Fellow, PGY-6  Pager: [phone]  1/3/2018 8:49 AM    Follow-up:   Future Appointments  Date Time Provider Department Center   1/3/2018 9:20 AM Osmani Barrientos MD [facility] NEPHRO [place]   1/10/2018 9:00 AM Lianna Peters NP [facility] GASTRO [address]   1/12/2018 10:00 AM Liberty Berger MD [facility]

## 2018-01-03 NOTE — PROGRESS NOTES
I have personally interviewed and examined the patient. I have reviewed the notes, assessments and plans performed by Dr Morgan and I CONCUR with her documentation of Neville Cruz.

## 2018-01-18 ENCOUNTER — OFFICE VISIT (OUTPATIENT)
Dept: FAMILY MEDICINE | Facility: CLINIC | Age: 83
End: 2018-01-18
Payer: MEDICARE

## 2018-01-18 ENCOUNTER — LAB VISIT (OUTPATIENT)
Dept: LAB | Facility: HOSPITAL | Age: 83
End: 2018-01-18
Attending: INTERNAL MEDICINE
Payer: MEDICARE

## 2018-01-18 VITALS
HEART RATE: 80 BPM | TEMPERATURE: 98 F | HEIGHT: 66 IN | WEIGHT: 142.19 LBS | DIASTOLIC BLOOD PRESSURE: 54 MMHG | BODY MASS INDEX: 22.85 KG/M2 | SYSTOLIC BLOOD PRESSURE: 98 MMHG

## 2018-01-18 DIAGNOSIS — N18.30 CKD (CHRONIC KIDNEY DISEASE) STAGE 3, GFR 30-59 ML/MIN: Chronic | ICD-10-CM

## 2018-01-18 DIAGNOSIS — E83.39 LOW PHOSPHATE LEVELS: ICD-10-CM

## 2018-01-18 DIAGNOSIS — I10 BENIGN ESSENTIAL HTN: Chronic | ICD-10-CM

## 2018-01-18 DIAGNOSIS — D50.9 MICROCYTIC ANEMIA: ICD-10-CM

## 2018-01-18 DIAGNOSIS — Z09 HOSPITAL DISCHARGE FOLLOW-UP: Primary | ICD-10-CM

## 2018-01-18 DIAGNOSIS — M19.011 ARTHRITIS OF RIGHT SHOULDER REGION: ICD-10-CM

## 2018-01-18 PROBLEM — K52.9 COLITIS: Status: RESOLVED | Noted: 2017-12-06 | Resolved: 2018-01-18

## 2018-01-18 LAB
BASOPHILS # BLD AUTO: 0.11 K/UL
BASOPHILS NFR BLD: 1 %
DIFFERENTIAL METHOD: ABNORMAL
EOSINOPHIL # BLD AUTO: 0.3 K/UL
EOSINOPHIL NFR BLD: 2.5 %
ERYTHROCYTE [DISTWIDTH] IN BLOOD BY AUTOMATED COUNT: 18.4 %
HCT VFR BLD AUTO: 41.2 %
HGB BLD-MCNC: 12.7 G/DL
IMM GRANULOCYTES # BLD AUTO: 0.05 K/UL
IMM GRANULOCYTES NFR BLD AUTO: 0.4 %
LYMPHOCYTES # BLD AUTO: 1.2 K/UL
LYMPHOCYTES NFR BLD: 11.1 %
MCH RBC QN AUTO: 24.3 PG
MCHC RBC AUTO-ENTMCNC: 30.8 G/DL
MCV RBC AUTO: 79 FL
MONOCYTES # BLD AUTO: 0.9 K/UL
MONOCYTES NFR BLD: 7.8 %
NEUTROPHILS # BLD AUTO: 8.6 K/UL
NEUTROPHILS NFR BLD: 77.2 %
NRBC BLD-RTO: 0 /100 WBC
PLATELET # BLD AUTO: 513 K/UL
PMV BLD AUTO: 12.4 FL
RBC # BLD AUTO: 5.23 M/UL
WBC # BLD AUTO: 11.14 K/UL

## 2018-01-18 PROCEDURE — 99499 UNLISTED E&M SERVICE: CPT | Mod: S$GLB,,, | Performed by: INTERNAL MEDICINE

## 2018-01-18 PROCEDURE — 99999 PR PBB SHADOW E&M-EST. PATIENT-LVL III: CPT | Mod: PBBFAC,,, | Performed by: INTERNAL MEDICINE

## 2018-01-18 PROCEDURE — 80069 RENAL FUNCTION PANEL: CPT

## 2018-01-18 PROCEDURE — 36415 COLL VENOUS BLD VENIPUNCTURE: CPT | Mod: PO

## 2018-01-18 PROCEDURE — 85025 COMPLETE CBC W/AUTO DIFF WBC: CPT

## 2018-01-18 PROCEDURE — 99214 OFFICE O/P EST MOD 30 MIN: CPT | Mod: S$GLB,,, | Performed by: INTERNAL MEDICINE

## 2018-01-18 RX ORDER — AMLODIPINE BESYLATE 10 MG/1
5 TABLET ORAL DAILY
Qty: 45 TABLET | Refills: 3
Start: 2018-01-18 | End: 2018-01-22 | Stop reason: SDUPTHER

## 2018-01-18 NOTE — PATIENT INSTRUCTIONS
Check your blood pressure at least once daily in the morning before breakfast.  Keep log of values.

## 2018-01-18 NOTE — PROGRESS NOTES
Subjective:        Patient ID: Neville Cruz is a 82 y.o. male.    Chief Complaint: Hospital Follow Up and Fatigue    HPI   Neville Cruz presents for hospital follow up.  Pt is accompanied by his granddaughter, Fabian.  Pt was hospitalized 1 month ago for colitis.  Since discharge pt has been very fatigued.  He reports no energy and also low blood pressures.  He denies fever, chest pain, SOB, abdominal pain, diarrhea, blood in stool, melena, hematuria, dysuria.  He doesn't think he's drinking as much water as he should.  He has been constipated, needs Glycolax to have a BM.  He has not been eating much because of dental problems; he has follow up with his dentist at [facility] but procedures and treatments are difficult due to his hx of XRT for throat CA.  Pt generally takes his medications as prescribed but he did not take his bp medications today because he woke up with no energy and felt like his bp was low.    Pt saw a colleague for right shoulder pain, was diagnosed with OA and given Tylenol 3 for severe pain.  This does help and he takes it PRN.    Review of Systems  as per HPI      Objective:        Vitals:    01/18/18 1456   BP: (!) 98/54   Pulse: 80   Temp: 97.6 °F (36.4 °C)     Physical Exam   Constitutional: He appears well-developed and well-nourished. No distress.   HENT:   Head: Normocephalic and atraumatic.   Mouth/Throat: Oropharynx is clear and moist.   Cardiovascular: Normal rate, regular rhythm and normal heart sounds.    Pulmonary/Chest: Effort normal and breath sounds normal. No respiratory distress.   Abdominal: Soft. Bowel sounds are normal. He exhibits no distension and no mass. There is no tenderness. There is no guarding.   Musculoskeletal: He exhibits no edema.   Neurological: He is alert.   Skin: Skin is warm and dry.   Psychiatric: He has a normal mood and affect. His behavior is normal. Judgment and thought content normal.   Vitals reviewed.          Assessment:         1. Hospital  discharge follow-up    2. Benign essential HTN    3. CKD (chronic kidney disease) stage 3, GFR 30-59 ml/min    4. Low phosphate levels    5. Microcytic anemia    6. Arthritis of right shoulder region              Plan:         Neville was seen today for hospital follow up and fatigue.    Diagnoses and all orders for this visit:    Hospital discharge follow-up: Colitis resolved.  Recommend drinking more fluids and take Glycolax q2-3 days to have a BM q2-3 days.  - bp management as per below  - labs today to check CBC, renal function    Benign essential HTN: bp low normal today and at pt's recent cardiology appt, may be contributing to pt's fatigue.  Decrease amlodipine from 10 to 5mg qd.  Continue Benazepril 20mg qd and Metoprolol 50mg qd.  Check bp at least once daily; bp log given to pt to keep record.  Phone review bps in 1 week; if still low normal, DC amlodipine.   -     amLODIPine (NORVASC) 10 MG tablet; Take 0.5 tablets (5 mg total) by mouth once daily.    CKD (chronic kidney disease) stage 3, GFR 30-59 ml/min: recheck renal function today  -     Renal function panel; Future  -     CBC auto differential; Future    Low phosphate levels    Microcytic anemia: CBC today; pt reports no gross bleeding.  -     CBC auto differential; Future    Arthritis of right shoulder region: No acute issues; Tylenol 3 PRN severe pain, continue activity as tolerated.        Follow-up in about 1 week (around 1/25/2018) for high blood pressure (phone review).    Patient Instructions   Check your blood pressure at least once daily in the morning before breakfast.  Keep log of values.

## 2018-01-19 ENCOUNTER — TELEPHONE (OUTPATIENT)
Dept: FAMILY MEDICINE | Facility: CLINIC | Age: 83
End: 2018-01-19

## 2018-01-19 LAB
ALBUMIN SERPL BCP-MCNC: 3.5 G/DL
ANION GAP SERPL CALC-SCNC: 7 MMOL/L
BUN SERPL-MCNC: 25 MG/DL
CALCIUM SERPL-MCNC: 8.3 MG/DL
CHLORIDE SERPL-SCNC: 110 MMOL/L
CO2 SERPL-SCNC: 20 MMOL/L
CREAT SERPL-MCNC: 2.1 MG/DL
EST. GFR  (AFRICAN AMERICAN): 33.1 ML/MIN/1.73 M^2
EST. GFR  (NON AFRICAN AMERICAN): 28.7 ML/MIN/1.73 M^2
GLUCOSE SERPL-MCNC: 100 MG/DL
PHOSPHATE SERPL-MCNC: 2.3 MG/DL
POTASSIUM SERPL-SCNC: 5 MMOL/L
SODIUM SERPL-SCNC: 137 MMOL/L

## 2018-01-19 NOTE — TELEPHONE ENCOUNTER
Called and notified pt of lab results.  bp today 120/60s.  Pt still feels fatigued.  Recommend continuing amlodipine 5mg qd, drink plenty of fluids, keep log of bp.  Will call pt in 1 week for phone review of bp.

## 2018-01-22 ENCOUNTER — TELEPHONE (OUTPATIENT)
Dept: FAMILY MEDICINE | Facility: CLINIC | Age: 83
End: 2018-01-22

## 2018-01-22 DIAGNOSIS — I10 BENIGN ESSENTIAL HTN: Primary | Chronic | ICD-10-CM

## 2018-01-22 RX ORDER — AMLODIPINE BESYLATE 5 MG/1
5 TABLET ORAL DAILY
Qty: 90 TABLET | Refills: 3 | Status: SHIPPED | OUTPATIENT
Start: 2018-01-22 | End: 2018-01-01 | Stop reason: SDUPTHER

## 2018-01-22 NOTE — TELEPHONE ENCOUNTER
Spoke with Clemencia, pt's daughter.  Reviewed lab results.  Sent Rx for amlodipine 5mg to [facility], they are having difficulty cutting the 10mg tabs in half.  Will call pt later this week to review bp values.

## 2018-01-22 NOTE — TELEPHONE ENCOUNTER
----- Message from Sania Fay sent at 1/22/2018 12:41 PM CST -----  Contact: Daughter Clemencia [phone]     Patient would like to get test results.  Name of test (lab, mammo, etc.):  Labs  Date of test:  01/18/2018  Ordering provider: Dr. Berger  Where was the test performed: [place]   Comments:  Patient's daughter would like for you to give her a call back to find out about her fathers labs results. / Diamond

## 2018-01-22 NOTE — TELEPHONE ENCOUNTER
Attempted to return patient's call, left message asking for return call with best time to reach her by phone.

## 2018-01-26 ENCOUNTER — TELEPHONE (OUTPATIENT)
Dept: FAMILY MEDICINE | Facility: CLINIC | Age: 83
End: 2018-01-26

## 2018-01-26 NOTE — TELEPHONE ENCOUNTER
Called pt to review bps, he reports the followin/19 113/69   117/67   129/67   124/68   131/67   131/74   136/68    Pt reports his fatigue is a little better.  Advised pt to continue with current medication regimen, will phone review again in 1 week and pt has appt with nephrology in 1 month.  I told pt I would call Clemencia and give her the update.    Left message on Clemencia's cell phone with update and return call #.

## 2018-02-02 NOTE — TELEPHONE ENCOUNTER
Pt's home bp with amlodipine 5mg 120s-130s systolic until yesterday it was low so he did not take amlodipine yesterday.  This morning he took it and his bp was low normal 101/69 at his GI appt.  Advised pt to check his bp in the AM, if systolic >130, take amlodipine.  Phone review bps in 1 week.  Verbal consent obtained to notify his daughter Clemencia of these changes.

## 2018-02-02 NOTE — LETTER
February 5, 2018      Rosibel Chu, FN  [address] [facility] [address]           [facility] - Gastroenterology  [address] [facility] [address]  Phone: [phone]  Fax: [phone]          Patient: Neville Cruz   MR Number: [id number]   YOB: 1935   Date of Visit: 2/2/2018       Dear Rosibel Chu:    Thank you for referring Neville Cruz to me for evaluation. Attached you will find relevant portions of my assessment and plan of care.    If you have questions, please do not hesitate to call me. I look forward to following Neville Cruz along with you.    Sincerely,    Lianna Peters, BENJI    Enclosure  CC:  No Recipients    If you would like to receive this communication electronically, please contact [email or web address] or [phone] to request more information on [facility] Link access.    For providers and/or their staff who would like to refer a patient to [facility], please contact us through our one-stop-shop provider referral line, [facility], at [phone].    If you feel you have received this communication in error or would no longer like to receive these types of communications, please e-mail [email or web address]

## 2018-02-07 NOTE — TELEPHONE ENCOUNTER
Dr. Liberty Berger    Pt currently taking Plavix. Pt needs to be scheduled for EGD/colonoscopy. Per endoscopy protocol, Pt to hold Plavix x 5  days prior to procedure. Please advise and message back for documentation.    Thank you

## 2018-02-07 NOTE — TELEPHONE ENCOUNTER
Dr.Jennifer TITI Morgan or Dr. Georgi Pickens,    Please see below messages and advise.     Thank you

## 2018-02-07 NOTE — TELEPHONE ENCOUNTER
I am not comfortable clearing pt to hold Plavix as he has reported adverse outcomes when he held it in the past.  Pt has seen Dr. Morgan and Dr. Pickens in cardiology.  Please check with cardiology regarding holding Plavix for endoscopy.  Thanks.

## 2018-02-08 NOTE — TELEPHONE ENCOUNTER
Called pt about bp.  Pt states he feels pretty good.    1/25 132/66  1/26 121/66  1/27 127/67 w amlo 5  2/5 114/65 w amlo 5  2/6 110/62 w amlo 5  2/7 118/61 w amlo 5    Pt reports sometimes AM bp in 100s before taking amlodipine.  Will continue to check bp first, if systolic >130, then take amlodipine.  If <130, do not take amlodipine that day.    Left VM with update on daughter's mobile.

## 2018-02-08 NOTE — TELEPHONE ENCOUNTER
----- Message from Lianna Peters NP sent at 2/8/2018  3:18 PM CST -----  Please notify Mr. Cruz Iron levels are low continue with EGD and Colonoscopy. He has not scheduled can we give him number to schedule. Thanks.     Lianna

## 2018-02-19 NOTE — TELEPHONE ENCOUNTER
"----- Message from Romelia Dunn sent at 2/19/2018  9:40 AM CST -----  Contact: [facility]/ [phone]   RX request - refill or new RX.  Is this a refill or new RX:  refill  RX name and strength: acetaminophen-codeine 300-30mg (TYLENOL #3) 300-30 mg Tab  Directions: TAKE 1 TABLET BY MOUTH THREE TIMES DAILY AS NEEDED  Is this a 30 day or 90 day RX:    Pharmacy name and phone # (DON'T enter "on file" or "in chart"): [facility] - [phone] (Phone)  [phone] (Fax)  Comments:      RX request - refill or new RX.  Is this a refill or new RX:  refill  RX name and strength: cholecalciferol, vitamin D3, 1,000 unit capsule  Directions: Take 1 capsule (1,000 Units total) by mouth once daily. - Oral  Is this a 30 day or 90 day RX:    Pharmacy name and phone # (DON'T enter "on file" or "in chart"): [facility] [phone] (Phone)  [phone] (Fax)    Comments:        "

## 2018-02-20 NOTE — TELEPHONE ENCOUNTER
----- Message from Anh Nelson sent at 2/20/2018  3:32 PM CST -----  Contact: Valencia/Clemencia [phone]  The phamacist said the zolpidem (AMBIEN CR) 6.25 MG CR tablet is not covered by insurance therefore, she faxed a request to refill original  ambien script for 5mg.    Thank you

## 2018-02-21 NOTE — LETTER
February 21, 2018      Rosibel Chu, FNP  [address] [address]           [facility] - Nephrology  [address]  [facility] [address]  Phone: [phone]  Fax: [phone]          Patient: Neville Cruz   MR Number: [id number]   YOB: 1935   Date of Visit: 2/21/2018       Dear Rosibel Chu:    Thank you for referring Neville Cruz to me for evaluation. Attached you will find relevant portions of my assessment and plan of care.    If you have questions, please do not hesitate to call me. I look forward to following Neville Cruz along with you.    Sincerely,    Jose Miguel Lopez MD    Enclosure  CC:  No Recipients    If you would like to receive this communication electronically, please contact [email or web address] or [phone] to request more information on [facility] Link access.    For providers and/or their staff who would like to refer a patient to [facility], please contact us through our one-stop-shop provider referral line, [facility], at [phone].    If you feel you have received this communication in error or would no longer like to receive these types of communications, please e-mail [email or web address]

## 2018-02-21 NOTE — TELEPHONE ENCOUNTER
----- Message from Gracie Prado sent at 2/21/2018 10:00 AM CST -----  Contact: gill/Clemencia/[phone]  Patient pressure is low, please call and advise.

## 2018-02-21 NOTE — PROGRESS NOTES
Patient is here today for evaluation of CKD. He has known of problem with kidneys for many years; There is hx of hypertension and BPH. His most recent lab (1/18/18) Cr 2.1 mg/dl; eGFR; 29 mL/min; potassium 5.0 mmol/L Of note he has chronic hypophosphatemia; denies muscle weakness or pain. Current meds include amlodipine; anthony azepril; metoprolol Today he has no new complaints    ROS;   Pleasant gentleman; no acute distress; oriented x 3  Mood and Affect; Appropriate  Otherwise non-contributory   Exam  HEENT; Grossly Intact  CHEST Clearf P&A; no rales or rhonchi  HEART; RR; S1&S2 no murmur rub gallop  ABD; BS(+); non-tender; (-)CVAT  EXT; (-)Edema    Impression;   CKD IV Likely secondary to hypertension; Dr Berger currently adjusting meds; systolic BP<100 mmHg  Lab today; RFP; U/A; Urine P/C  Schedule Renal US    Hypophosphatemia   Increase intake of dairy product; poultry; may require supp      Plan  Return Visit; 1 mo; pending above

## 2018-02-26 NOTE — TELEPHONE ENCOUNTER
Wondering if there is an answer yet as to if pt can stop their Plavix.  Need to get him scheduled for EGD/colonoscopy for anemia.  Please advise.  Thanks

## 2018-03-13 PROBLEM — N17.9 AKI (ACUTE KIDNEY INJURY): Status: ACTIVE | Noted: 2018-01-01

## 2018-03-13 PROBLEM — K52.9 COLITIS, ACUTE: Status: ACTIVE | Noted: 2018-01-01

## 2018-03-13 PROBLEM — E44.0 MALNUTRITION OF MODERATE DEGREE: Status: ACTIVE | Noted: 2018-01-01

## 2018-03-13 PROBLEM — C79.9 MULTIPLE LESIONS OF METASTATIC MALIGNANCY: Status: ACTIVE | Noted: 2018-01-01

## 2018-03-13 PROBLEM — E87.5 HYPERKALEMIA: Status: ACTIVE | Noted: 2018-01-01

## 2018-03-13 PROBLEM — D72.829 LEUKOCYTOSIS: Status: ACTIVE | Noted: 2018-01-01

## 2018-03-13 PROBLEM — R91.8 PULMONARY NODULES: Status: ACTIVE | Noted: 2018-01-01

## 2018-03-13 NOTE — ASSESSMENT & PLAN NOTE
- Esophageal cancer diagnosed and treated with XRT in 1997.  - Has not had recent follow up, saw an ENT in [place] a couple of years ago  - Will consider whether he has something that could be biopsied

## 2018-03-13 NOTE — H&P
[facility] Medicine  History & Physical    Patient Name: Neville Cruz  MRN: [id number]  Admission Date: 3/12/2018  Attending Physician: Ebonie Grimes MD   Primary Care Provider: Liberty Berger MD         Patient information was obtained from patient, past medical records and ER records.     Subjective:     Principal Problem:Hyperkalemia    Chief Complaint:   Chief Complaint   Patient presents with    Abdominal Pain     Pt CO generalized abd pain since 1500 today, the pain is described as cramping, and has associated diarrhea, and nausea, denies vomiting.         HPI: Mr. Neville Cruz is a 82 y.o. male, with PMH of Esophageal cancer, CKD-III, CAD, h/o MI (S/p stent in LAD, on Plavix), HTN, who presented to [facility] ED on 3/12/18 2/2 intermittent generalized abdominal pain that began approximately seven hours PTA. He reports abdominal cramps,45 episodes of soft, non-bloody diarrhea (brown in color), and decreased appetite with 15 lb weight loss in the past 1 month. This weight loss was unintended. He denies fever, chills, nausea, myalgias, vomiting, constipation, and blood in stool.     The patient was evaluated in the ED, and diagnosed with colitis, he was admitted for continued antibiotic treatment. Additionally, tests found multiple lung nodules, and lumbosacral spinal lesions, suspicious for metastatic spread of his previously diagnosed esophageal cancer.     Past Medical History:   Diagnosis Date    Age-related osteoporosis without current pathological fracture 6/6/2017    Age-related osteoporosis without current pathological fracture 6/6/2017    Benign prostatic hyperplasia 05/10/2017    Cancer     throat    Cataract     CKD (chronic kidney disease) stage 3, GFR 30-59 ml/min 5/12/2017    Constipation 5/10/2017    Coronary artery disease involving native coronary artery of native heart without angina pectoris 05/10/2017    cards/Dr. Melva Reilly     H/O heart artery stent     LAD 9/22/16 Zotarolimus eluting coronary stent system- [facility]    History of throat cancer 1997    s/p surgery, XRT    Hypertension     Insomnia     Primary osteoarthritis of right knee 05/10/2017    sees ortho, knee replacement recommended       Past Surgical History:   Procedure Laterality Date    CATARACT EXTRACTION Right     july 2017    CORONARY ANGIOPLASTY WITH STENT PLACEMENT      LCx 1999, mid RCA 2010, LAD 9/2016; cards/Dr. Melva Reilly    THROAT SURGERY      2/2 throat cancer       Review of patient's allergies indicates:  No Known Allergies    No current facility-administered medications on file prior to encounter.      Current Outpatient Prescriptions on File Prior to Encounter   Medication Sig    aspirin 81 MG Chew Take 81 mg by mouth once daily.    benazepril (LOTENSIN) 20 MG tablet Take 1 tablet (20 mg total) by mouth once daily.    cholecalciferol, vitamin D3, 1,000 unit capsule Take 1 capsule (1,000 Units total) by mouth once daily.    clopidogrel (PLAVIX) 75 mg tablet Take 1 tablet (75 mg total) by mouth once daily.    metoprolol succinate (TOPROL-XL) 50 MG 24 hr tablet Take 1 tablet (50 mg total) by mouth once daily.    omeprazole (PRILOSEC) 20 MG capsule TAKE 1 CAPSULE BY MOUTH ONCE DAILY AT 6 AM.    polyethylene glycol (GLYCOLAX) 17 gram/dose powder Take 17 g by mouth once daily.    pravastatin (PRAVACHOL) 40 MG tablet Take 1 tablet (40 mg total) by mouth once daily.    tamsulosin (FLOMAX) 0.4 mg Cp24 Take 1 capsule (0.4 mg total) by mouth once daily.    zolpidem (AMBIEN) 5 MG Tab TAKE 1 TABLET(5 MG) BY MOUTH EVERY NIGHT AS NEEDED FOR INSOMNIA    acetaminophen-codeine 300-30mg (TYLENOL #3) 300-30 mg Tab Take 1 tablet by mouth every 12 (twelve) hours as needed (severe pain).    amLODIPine (NORVASC) 5 MG tablet Take 1 tablet (5 mg total) by mouth daily as needed. Take if systolic blood pressure over 130.     Family History     Problem Relation (Age of  Onset)    Cancer Mother    Hypertension Mother, Daughter, Son, Daughter        Social History Main Topics    Smoking status: Former Smoker     Years: 50.00     Types: Cigarettes    Smokeless tobacco: Never Used    Alcohol use No    Drug use: No    Sexual activity: No     Review of Systems   Constitutional: Positive for appetite change and unexpected weight change. Negative for chills, diaphoresis, fatigue and fever.   Respiratory: Negative for cough, shortness of breath and wheezing.    Cardiovascular: Negative for chest pain, palpitations and leg swelling.   Gastrointestinal: Positive for abdominal pain and diarrhea. Negative for constipation, nausea and vomiting.   Genitourinary: Negative for decreased urine volume, difficulty urinating, dysuria, frequency, hematuria and urgency.   Musculoskeletal: Negative for back pain.   Skin: Negative for rash and wound.   Neurological: Negative for dizziness, syncope, weakness, numbness and headaches.   Psychiatric/Behavioral: Negative for confusion and decreased concentration.     Objective:     Vital Signs (Most Recent):  Temp: 98.1 °F (36.7 °C) (03/13/18 0756)  Pulse: 64 (03/13/18 0739)  Resp: 13 (03/13/18 0739)  BP: (!) 161/77 (03/13/18 0739)  SpO2: 99 % (03/13/18 0739) Vital Signs (24h Range):  Temp:  [97.3 °F (36.3 °C)-99 °F (37.2 °C)] 98.1 °F (36.7 °C)  Pulse:  [64-98] 64  Resp:  [13-20] 13  SpO2:  [97 %-100 %] 99 %  BP: (127-180)/(68-88) 161/77     Weight:  (150)  Body mass index is 25.58 kg/m².    Physical Exam   Constitutional: He is oriented to person, place, and time. Vital signs are normal. He appears well-developed and well-nourished.  Non-toxic appearance. He does not have a sickly appearance. He does not appear ill. No distress.   Thin, frail, cachetic male.    HENT:   Head: Normocephalic and atraumatic.   Right Ear: External ear normal.   Left Ear: External ear normal.   Speaks in muffled voice.   Eyes: Conjunctivae and EOM are normal. Pupils are equal,  round, and reactive to light. No scleral icterus.   Neck: Normal range of motion. Neck supple. No JVD present. No tracheal deviation present.   Cardiovascular: Normal rate, regular rhythm, normal heart sounds and intact distal pulses.  Exam reveals no gallop and no friction rub.    No murmur heard.  Pulmonary/Chest: Effort normal and breath sounds normal. No stridor. No respiratory distress. He has no wheezes. He has no rales.   Abdominal: Soft. Bowel sounds are normal. He exhibits no distension and no mass. There is no tenderness. There is no guarding.   Neurological: He is alert and oriented to person, place, and time.   Skin: Skin is warm and dry. Capillary refill takes less than 2 seconds. He is not diaphoretic.   Psychiatric: He has a normal mood and affect. His behavior is normal. Judgment and thought content normal.   Nursing note and vitals reviewed.        CRANIAL NERVES     CN III, IV, VI   Pupils are equal, round, and reactive to light.  Extraocular motions are normal.        Significant Labs:   BMP:   Recent Labs  Lab 03/13/18  0533         K 5.4*      CO2 19*   BUN 22   CREATININE 1.9*   CALCIUM 8.3*     CBC:   Recent Labs  Lab 03/12/18 2208 03/13/18  0543   WBC 21.77* 13.60*   HGB 14.6 12.3*   HCT 47.3 39.2*   * 421*     CMP:   Recent Labs  Lab 03/12/18 2208 03/12/18  2256 03/13/18  0533     --  136   K 6.5* 7.1* 5.4*     --  109   CO2 21*  --  19*   *  --  106   BUN 24*  --  22   CREATININE 2.0*  --  1.9*   CALCIUM 9.0  --  8.3*   PROT 7.3  --   --    ALBUMIN 3.9  --   --    BILITOT 0.8  --   --    ALKPHOS 129  --   --    AST 17  --   --    ALT 14  --   --    ANIONGAP 8  --  8   EGFRNONAA 30*  --  32*     All pertinent labs within the past 24 hours have been reviewed.    Significant Imaging: I have reviewed all pertinent imaging results/findings within the past 24 hours.   Imaging Results          X-Ray Chest AP Portable (Final result)  Result time  03/13/18 02:18:04    Final result by Dale Ch MD (03/13/18 02:18:04)                 Impression:        Numerous bilateral pulmonary nodules concerning for metastatic disease and interstitial opacities in the left upper lung field. Consider nonemergent chest CT for further workup.      Electronically signed by: Dale Ch  Date:     03/13/18  Time:    02:18              Narrative:    COMPARISON: None available.    FINDINGS: One view of the chest was obtained.  Cardiac silhouette is nonenlarged.  There are numerous small bilateral pulmonary nodules, most conspicuous at the right lung base. There is central vascular congestion. Interstitial opacities and possibly fibrosis noted in the left upper lung field. Prominence of the left eleuterio may suggest lymphadenopathy.                             CT Abdomen Pelvis  Without Contrast (Final result)     Abnormal  Result time 03/13/18 01:04:55    Final result by Jagdish Tinajero MD (03/13/18 01:04:55)                 Impression:       Wall thickening and extending from the splenic flexure to the rectum. The findings are consistent with nonspecific colitis ( infectious , inflammatory, and ischemic colitis).    Innumerable pulmonary nodules and lytic lesions in the lumbosacral spine, consistent with metastatic disease. The source of the metastatic disease is not evident on this CT scan. Endoscopic Correlation is suggested after the patient's acute symptoms resolve. Age-appropriate malignancy screening is also suggested.     Ectasia of infrarenal abdominal aorta with a 10 mm outpouching along the posterior lateral wall of the abdominal aorta. 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 No CT evidence of rupture. Dedicated CT angiogram of the abdomen is suggested when the patient is able to receive IV contrast.    Additional findings as above.    Report has been flagged in the EPIC medical record system.          Electronically signed by: JAGDISH TINAJERO MD  Date:      03/13/18  Time:    01:04              Narrative:    Exam: [id number]  03/13/18  00:19:00 [id number] ([facility]) : CT ABDOMEN PELVIS WITHOUT CONTRAST    Technique:     Using helical CT technique, axial images of the abdomen and pelvis were obtained from the top of the liver through the base of the bladder. Oral contrast was administered. Coronal and Sagittal Reformats were also obtained.    Comparison:    Abdomen x-ray dated 3/12/18    Findings:      There are no pleural effusions. There is no evidence of a pneumothorax. There are innumerable subcentimeter nodules in the lung bases. There is a 2.5 cm complex cystic cavity in the left lung base.    The heart is unremarkable. There is ectasia of the infrarenal abdominal aorta measuring 2.6 x 2.8 cm. There is a 10 mm outpouching involving the posterior lateral wall of the infrarenal abdominal aorta. There are extensive calcifications along the course of the abdominal aorta and its branch vessels. Noncontrast assessment of the mesenteric vessels and the portal vein are grossly unremarkable. There aneurysm dilatation of the of the bilateral common femoral veins. There is no evidence of lymphadenopathy in the abdomen or pelvis.    The esophagus, stomach, and the duodenum are within normal limits. The small bowel loops are unremarkable. The visualized portions of the appendix is within normal limits. There is extensive colonic diverticula. There is wall thickening and extending from the splenic flexure to the rectum. No evidence of pneumatosis is identified.    No discrete hepatic mass is identified on this noncontrast examination. There is cholelithiasis. The biliary tree is within normal limits. The spleen is enlarged. There are calcifications within the spleen. There is fatty atrophy of the pancreas. The adrenal glands are within normal limits.    Both kidneys are atrophic. There is a 3 mm nonobstructing stone in upper pole the left kidney. The ureters and bladder unremarkable.  The prostate gland is within normal limits.    There is no evidence of free fluid in the abdomen or pelvis. There is no evidence of free air.    The psoas margins are unremarkable. The abdominal wall is within normal limits. There are degenerative changes in the osseous structures. There is mixed sclerotic and lytic lesion of the L5 vertebral body. There are innumerable lytic lesions throughout the lumbosacral bones.                             X-Ray Abdomen Flat And Erect (Final result)  Result time 03/12/18 23:10:36    Final result by Dale Ch MD (03/12/18 23:10:36)                 Impression:    Nonobstructive bowel gas pattern.      Electronically signed by: Dale Ch  Date:     03/12/18  Time:    23:10              Narrative:    EXAM: ABDOMEN FLAT AND UPRIGHT    COMPARISON:  12/21/2017.    TECHNIQUE: Flat and upright views of the abdomen were obtained.    FINDINGS:  Cardiac wires overlie the upper abdomen. No large volume pneumoperitoneum is identified on the limited upright view. Bowel gas pattern is nonobstructive. No suspicious calcifications are detected. Degenerative changes are present in the lumbar spine.                               Assessment/Plan:     * Hyperkalemia    - still elevated, but improved s/p hyperkalemia cocktail  - no EKG changes  - monitor on tele   - monitor BMP           BARTOLO (acute kidney injury)    - likely intrarenal   - assess US, urine studies   - continue IV fluids   - no NSAIDs, ACEi, ARB  - monitor        CKD (chronic kidney disease) stage 3, GFR 30-59 ml/min    - baseline Cr appears to be 1.8   - currently 2.0   - avoid nephrtoxins  - renally dose meds   - monitor         Leukocytosis    - etiology likely colitis   - UA negative  - CXR without acute tiology   - treat colitis   - monitor         Benign essential HTN    - BP elevated upon admission  - continue BP meds except ACEi/ARB  - hydralazine PRN  - monitor        Coronary artery disease involving native  coronary artery of native heart without angina pectoris    - continue ASA, statin          VTE Risk Mitigation         Ordered     Medium Risk of VTE  Once      03/13/18 0803     Reason for No Pharmacological VTE Prophylaxis  Once      03/13/18 0803     Place sequential compression device  Until discontinued      03/13/18 [id number]             Aniyah Lara PA-C  Department of Hospital Medicine   [facility]

## 2018-03-13 NOTE — SUBJECTIVE & OBJECTIVE
Past Medical History:   Diagnosis Date    Age-related osteoporosis without current pathological fracture 6/6/2017    Age-related osteoporosis without current pathological fracture 6/6/2017    Benign prostatic hyperplasia 05/10/2017    Cancer     throat    Cataract     CKD (chronic kidney disease) stage 3, GFR 30-59 ml/min 5/12/2017    Constipation 5/10/2017    Coronary artery disease involving native coronary artery of native heart without angina pectoris 05/10/2017    cards/Dr. Melva Reilly    H/O heart artery stent     LAD 9/22/16 Zotarolimus eluting coronary stent system- [facility]    History of throat cancer 1997    s/p surgery, XRT    Hypertension     Insomnia     Primary osteoarthritis of right knee 05/10/2017    sees ortho, knee replacement recommended       Past Surgical History:   Procedure Laterality Date    CATARACT EXTRACTION Right     july 2017    CORONARY ANGIOPLASTY WITH STENT PLACEMENT      LCx 1999, mid RCA 2010, LAD 9/2016; cards/Dr. Melva Reilly    THROAT SURGERY      2/2 throat cancer       Review of patient's allergies indicates:  No Known Allergies    No current facility-administered medications on file prior to encounter.      Current Outpatient Prescriptions on File Prior to Encounter   Medication Sig    aspirin 81 MG Chew Take 81 mg by mouth once daily.    benazepril (LOTENSIN) 20 MG tablet Take 1 tablet (20 mg total) by mouth once daily.    cholecalciferol, vitamin D3, 1,000 unit capsule Take 1 capsule (1,000 Units total) by mouth once daily.    clopidogrel (PLAVIX) 75 mg tablet Take 1 tablet (75 mg total) by mouth once daily.    metoprolol succinate (TOPROL-XL) 50 MG 24 hr tablet Take 1 tablet (50 mg total) by mouth once daily.    omeprazole (PRILOSEC) 20 MG capsule TAKE 1 CAPSULE BY MOUTH ONCE DAILY AT 6 AM.    polyethylene glycol (GLYCOLAX) 17 gram/dose powder Take 17 g by mouth once daily.    pravastatin (PRAVACHOL) 40 MG tablet Take 1 tablet (40 mg total) by  mouth once daily.    tamsulosin (FLOMAX) 0.4 mg Cp24 Take 1 capsule (0.4 mg total) by mouth once daily.    zolpidem (AMBIEN) 5 MG Tab TAKE 1 TABLET(5 MG) BY MOUTH EVERY NIGHT AS NEEDED FOR INSOMNIA    acetaminophen-codeine 300-30mg (TYLENOL #3) 300-30 mg Tab Take 1 tablet by mouth every 12 (twelve) hours as needed (severe pain).    amLODIPine (NORVASC) 5 MG tablet Take 1 tablet (5 mg total) by mouth daily as needed. Take if systolic blood pressure over 130.     Family History     Problem Relation (Age of Onset)    Cancer Mother    Hypertension Mother, Daughter, Son, Daughter        Social History Main Topics    Smoking status: Former Smoker     Years: 50.00     Types: Cigarettes    Smokeless tobacco: Never Used    Alcohol use No    Drug use: No    Sexual activity: No     Review of Systems   Constitutional: Positive for appetite change and unexpected weight change. Negative for chills, diaphoresis, fatigue and fever.   Respiratory: Negative for cough, shortness of breath and wheezing.    Cardiovascular: Negative for chest pain, palpitations and leg swelling.   Gastrointestinal: Positive for abdominal pain and diarrhea. Negative for constipation, nausea and vomiting.   Genitourinary: Negative for decreased urine volume, difficulty urinating, dysuria, frequency, hematuria and urgency.   Musculoskeletal: Negative for back pain.   Skin: Negative for rash and wound.   Neurological: Negative for dizziness, syncope, weakness, numbness and headaches.   Psychiatric/Behavioral: Negative for confusion and decreased concentration.     Objective:     Vital Signs (Most Recent):  Temp: 98.1 °F (36.7 °C) (03/13/18 0756)  Pulse: 64 (03/13/18 0739)  Resp: 13 (03/13/18 0739)  BP: (!) 161/77 (03/13/18 0739)  SpO2: 99 % (03/13/18 0739) Vital Signs (24h Range):  Temp:  [97.3 °F (36.3 °C)-99 °F (37.2 °C)] 98.1 °F (36.7 °C)  Pulse:  [64-98] 64  Resp:  [13-20] 13  SpO2:  [97 %-100 %] 99 %  BP: (127-180)/(68-88) 161/77     Weight:   (150)  Body mass index is 25.58 kg/m².    Physical Exam   Constitutional: He is oriented to person, place, and time. Vital signs are normal. He appears well-developed and well-nourished.  Non-toxic appearance. He does not have a sickly appearance. He does not appear ill. No distress.   Thin, frail, cachetic male.    HENT:   Head: Normocephalic and atraumatic.   Right Ear: External ear normal.   Left Ear: External ear normal.   Speaks in muffled voice.   Eyes: Conjunctivae and EOM are normal. Pupils are equal, round, and reactive to light. No scleral icterus.   Neck: Normal range of motion. Neck supple. No JVD present. No tracheal deviation present.   Cardiovascular: Normal rate, regular rhythm, normal heart sounds and intact distal pulses.  Exam reveals no gallop and no friction rub.    No murmur heard.  Pulmonary/Chest: Effort normal and breath sounds normal. No stridor. No respiratory distress. He has no wheezes. He has no rales.   Abdominal: Soft. Bowel sounds are normal. He exhibits no distension and no mass. There is no tenderness. There is no guarding.   Neurological: He is alert and oriented to person, place, and time.   Skin: Skin is warm and dry. Capillary refill takes less than 2 seconds. He is not diaphoretic.   Psychiatric: He has a normal mood and affect. His behavior is normal. Judgment and thought content normal.   Nursing note and vitals reviewed.        CRANIAL NERVES     CN III, IV, VI   Pupils are equal, round, and reactive to light.  Extraocular motions are normal.        Significant Labs:   BMP:   Recent Labs  Lab 03/13/18  0533         K 5.4*      CO2 19*   BUN 22   CREATININE 1.9*   CALCIUM 8.3*     CBC:   Recent Labs  Lab 03/12/18 2208 03/13/18  0543   WBC 21.77* 13.60*   HGB 14.6 12.3*   HCT 47.3 39.2*   * 421*     CMP:   Recent Labs  Lab 03/12/18 2208 03/12/18  2256 03/13/18  0533     --  136   K 6.5* 7.1* 5.4*     --  109   CO2 21*  --  19*   GLU  122*  --  106   BUN 24*  --  22   CREATININE 2.0*  --  1.9*   CALCIUM 9.0  --  8.3*   PROT 7.3  --   --    ALBUMIN 3.9  --   --    BILITOT 0.8  --   --    ALKPHOS 129  --   --    AST 17  --   --    ALT 14  --   --    ANIONGAP 8  --  8   EGFRNONAA 30*  --  32*     All pertinent labs within the past 24 hours have been reviewed.    Significant Imaging: I have reviewed all pertinent imaging results/findings within the past 24 hours.   Imaging Results          X-Ray Chest AP Portable (Final result)  Result time 03/13/18 02:18:04    Final result by Dale Ch MD (03/13/18 02:18:04)                 Impression:        Numerous bilateral pulmonary nodules concerning for metastatic disease and interstitial opacities in the left upper lung field. Consider nonemergent chest CT for further workup.      Electronically signed by: Dale Ch  Date:     03/13/18  Time:    02:18              Narrative:    COMPARISON: None available.    FINDINGS: One view of the chest was obtained.  Cardiac silhouette is nonenlarged.  There are numerous small bilateral pulmonary nodules, most conspicuous at the right lung base. There is central vascular congestion. Interstitial opacities and possibly fibrosis noted in the left upper lung field. Prominence of the left eleuterio may suggest lymphadenopathy.                             CT Abdomen Pelvis  Without Contrast (Final result)     Abnormal  Result time 03/13/18 01:04:55    Final result by Jagdish Tinajero MD (03/13/18 01:04:55)                 Impression:       Wall thickening and extending from the splenic flexure to the rectum. The findings are consistent with nonspecific colitis ( infectious , inflammatory, and ischemic colitis).    Innumerable pulmonary nodules and lytic lesions in the lumbosacral spine, consistent with metastatic disease. The source of the metastatic disease is not evident on this CT scan. Endoscopic Correlation is suggested after the patient's acute symptoms resolve.  Age-appropriate malignancy screening is also suggested.     Ectasia of infrarenal abdominal aorta with a 10 mm outpouching along the posterior lateral wall of the abdominal aorta. 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 No CT evidence of rupture. Dedicated CT angiogram of the abdomen is suggested when the patient is able to receive IV contrast.    Additional findings as above.    Report has been flagged in the EPIC medical record system.          Electronically signed by: KARLA ST MD  Date:     03/13/18  Time:    01:04              Narrative:    Exam: [id number]  03/13/18  00:19:00 [id number] ([facility]) : CT ABDOMEN PELVIS WITHOUT CONTRAST    Technique:     Using helical CT technique, axial images of the abdomen and pelvis were obtained from the top of the liver through the base of the bladder. Oral contrast was administered. Coronal and Sagittal Reformats were also obtained.    Comparison:    Abdomen x-ray dated 3/12/18    Findings:      There are no pleural effusions. There is no evidence of a pneumothorax. There are innumerable subcentimeter nodules in the lung bases. There is a 2.5 cm complex cystic cavity in the left lung base.    The heart is unremarkable. There is ectasia of the infrarenal abdominal aorta measuring 2.6 x 2.8 cm. There is a 10 mm outpouching involving the posterior lateral wall of the infrarenal abdominal aorta. There are extensive calcifications along the course of the abdominal aorta and its branch vessels. Noncontrast assessment of the mesenteric vessels and the portal vein are grossly unremarkable. There aneurysm dilatation of the of the bilateral common femoral veins. There is no evidence of lymphadenopathy in the abdomen or pelvis.    The esophagus, stomach, and the duodenum are within normal limits. The small bowel loops are unremarkable. The visualized portions of the appendix is within normal limits. There is extensive colonic diverticula. There is wall thickening and extending from the  splenic flexure to the rectum. No evidence of pneumatosis is identified.    No discrete hepatic mass is identified on this noncontrast examination. There is cholelithiasis. The biliary tree is within normal limits. The spleen is enlarged. There are calcifications within the spleen. There is fatty atrophy of the pancreas. The adrenal glands are within normal limits.    Both kidneys are atrophic. There is a 3 mm nonobstructing stone in upper pole the left kidney. The ureters and bladder unremarkable. The prostate gland is within normal limits.    There is no evidence of free fluid in the abdomen or pelvis. There is no evidence of free air.    The psoas margins are unremarkable. The abdominal wall is within normal limits. There are degenerative changes in the osseous structures. There is mixed sclerotic and lytic lesion of the L5 vertebral body. There are innumerable lytic lesions throughout the lumbosacral bones.                             X-Ray Abdomen Flat And Erect (Final result)  Result time 03/12/18 23:10:36    Final result by Dale Ch MD (03/12/18 23:10:36)                 Impression:    Nonobstructive bowel gas pattern.      Electronically signed by: Dale Ch  Date:     03/12/18  Time:    23:10              Narrative:    EXAM: ABDOMEN FLAT AND UPRIGHT    COMPARISON:  12/21/2017.    TECHNIQUE: Flat and upright views of the abdomen were obtained.    FINDINGS:  Cardiac wires overlie the upper abdomen. No large volume pneumoperitoneum is identified on the limited upright view. Bowel gas pattern is nonobstructive. No suspicious calcifications are detected. Degenerative changes are present in the lumbar spine.

## 2018-03-13 NOTE — ED NOTES
Pt rounding complete. Pt updated on POC. Pt verbalized understanding.  Pt denies any pain or needs at the moment. Pt does not appear to be in acute distress. Respirations are even and unlabored. VSS. Bed is locked and in lowest position with side rails up x2. Call light is within reach. Will continue to monitor. Family is at the bedside.

## 2018-03-13 NOTE — ED NOTES
Pt rounding complete. Pt educated on medications and why are we giving them. Pt verbalized understanding and had no further questions.  Pt denies any pain or needs at the moment. Pt does not appear to be in acute distress. Respirations are even and unlabored. VSS. Bed is locked and in lowest position with side rails up x2. Call light is within reach. Will continue to monitor. Family remains at the bedside.

## 2018-03-13 NOTE — PROGRESS NOTES
[facility] Hospital Medicine  Progress Note    Patient Name: Neville Cruz  MRN: [id number]  Patient Class: OP- Observation   Admission Date: 3/12/2018  Length of Stay: 0 days  Attending Physician: Ebonie Grimes MD;Ji*  Primary Care Provider: Liberty Berger MD        Subjective:     Principal Problem:Hyperkalemia    HPI:  Mr. Cruz is an 82 year old man who presented to the ED 3/12/18 with abdominal cramping and non-bloody diarrhea for about 7 hours.  In addition he has had anorexia and a 15 pound weight loss over the last month.  He has not had fever, vomiting, constipation or blood in the stool.  Significant labs included WBC 21.8K, potassium 6.5, BUN/creatinine 24/2 (at baseline).  CT was done and showed colitis from the splenic flexure to the rectum.  Additional findings included innumerable pulmonary nodules and lytic lesions in the lumbosacral spine, consistent with metastatic disease.  He was treated for hyperkalemia with insulin & glucose, bicarbonate and calcium gluconate, and was admitted for treatment of colitis.    Patient's history includes esophageal cancer diagnosed in 1997 and treated with radiation.  He has CKD IV and CAD.  He has undergone coronary artery stenting in 1999, 2010 and 2016.  He is followed by Dr. Gonzalez and is on Plavix.        Hospital Course:  Patient was started on IV antibiotics for colitis and GI was consulted to follow.  Patient had a similar episode to this late last year and a colonoscopy has already been scheduled for later this month (3/23).    Interval History:  Continues to have loose stools and abdominal cramping.  No other complaints.    Review of Systems   Constitutional: Negative for chills and fever.   Respiratory: Negative for cough and shortness of breath.    Cardiovascular: Negative for chest pain and palpitations.   Gastrointestinal: Positive for diarrhea.     Objective:     Vital Signs (Most Recent):  Temp: 97.6 °F (36.4 °C)  (03/13/18 1528)  Pulse: 76 (03/13/18 1528)  Resp: 16 (03/13/18 1528)  BP: (!) 165/82 (03/13/18 1528)  SpO2: 99 % (03/13/18 1528) Vital Signs (24h Range):  Temp:  [97.3 °F (36.3 °C)-99 °F (37.2 °C)] 97.6 °F (36.4 °C)  Pulse:  [64-98] 76  Resp:  [13-20] 16  SpO2:  [97 %-100 %] 99 %  BP: (127-180)/(66-88) 165/82     Weight:  (150)  Body mass index is 25.58 kg/m².    Intake/Output Summary (Last 24 hours) at 03/13/18 1715  Last data filed at 03/13/18 0800   Gross per 24 hour   Intake                0 ml   Output              300 ml   Net             -300 ml      Physical Exam   Constitutional: He is oriented to person, place, and time. He appears well-developed.   Thin, frail, cachectic.  Voice hoarse.   HENT:   Head: Normocephalic.   Eyes: Conjunctivae are normal. Pupils are equal, round, and reactive to light.   Neck: Neck supple. No thyromegaly present.   Cardiovascular: Normal rate, regular rhythm, normal heart sounds and intact distal pulses.  Exam reveals no gallop and no friction rub.    No murmur heard.  Pulmonary/Chest: Effort normal and breath sounds normal.   Abdominal: Soft. He exhibits no distension. There is no tenderness.   Hyperactive bowel sounds.   Musculoskeletal: Normal range of motion. He exhibits no edema.   Lymphadenopathy:     He has no cervical adenopathy.   Neurological: He is alert and oriented to person, place, and time.   Strength equal and symmetric   Skin: Skin is warm and dry. No rash noted.   Psychiatric: He has a normal mood and affect. His behavior is normal. Thought content normal.       Significant Labs:   BMP:   Recent Labs  Lab 03/13/18  0533         K 5.4*      CO2 19*   BUN 22   CREATININE 1.9*   CALCIUM 8.3*   MG 1.6       Significant Imaging: I have reviewed all pertinent imaging results/findings within the past 24 hours.    Assessment/Plan:      * Hyperkalemia    - Improved with treatment and holding Ace inhibitor  - no EKG changes  - monitor on tele   -  monitor BMP           Malnutrition of moderate degree    - Patient with anorexia and weight loss   - Possibly associated with malignancy - workup in progress          Pulmonary nodules    - As above - will discuss with IR to see if any would be amenable to biopsy.          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  - Has not had recent follow up, saw an ENT in [place] a couple of years ago  - Will consider whether he has something that could be biopsied            VTE Risk Mitigation         Ordered     Medium Risk of VTE  Once      03/13/18 0803     Reason for No Pharmacological VTE Prophylaxis  Once      03/13/18 0803     Place sequential compression device  Until discontinued      03/13/18 5723              Ebonie Contreras MD  Department of Hospital Medicine   [facility]

## 2018-03-13 NOTE — ED NOTES
Rec'd report from ZHANE Abreu RN. Pt in the L lateral recumbent position. Pt is A & O x 3, denies pain, SOB, fever, chills and N/V/D. Respirations are even and unlabored. Skin is warm, dry and pink. VSS. Pt is connected to the pulse ox, B/P cuff and EKG monitor. Bed is locked and in the low position w/ the side rails up and locked for pt safety. Daughter @ BS. Will continue to monitor closely.

## 2018-03-13 NOTE — ED NOTES
"Rounding completed on pt. Daughter, tiffany at bedside. Pt requesting to be updated on plan of care. Pt denies abd pain but does admit some discomfort and "gas", denies diarrhea.   "

## 2018-03-13 NOTE — ASSESSMENT & PLAN NOTE
- etiology likely colitis   - UA negative  - CXR without acute tiology   - treat colitis   - monitor

## 2018-03-13 NOTE — ED NOTES
Pt resting w/ eyes closed and in no distress. Respirations are even and unlabored. Will not disturb. Will continue to monitor closely.

## 2018-03-13 NOTE — ASSESSMENT & PLAN NOTE
- baseline Cr appears to be 1.8   - currently 2.0   - avoid nephrtoxins  - renally dose meds   - monitor

## 2018-03-13 NOTE — ASSESSMENT & PLAN NOTE
- Improved with treatment and holding Ace inhibitor  - no EKG changes  - monitor on tele   - monitor BMP

## 2018-03-13 NOTE — ED NOTES
Assumed care of pt from CONSTANTINO Ryan. MARY Lara at bedside discussing POC. Pt resting in stretcher in supine position, HOB at 30 degrees. Pt's voice appears hoarse, WNL per patient. Pt requesting something to eat. Denies abd pain, last episode of diarrhea last night approx 2000. Bed in loest, locked position, side rails up x 2. Call light within reach. Skin is warm, dry and intact.

## 2018-03-13 NOTE — ED NOTES
In room to update pt and family about POC. Pt will go to US and then to floor. Pt in bathroom - ambulated independently. Family member at bedside verbalized understanding and was given room number on the floor where she can wait. When patient returned, pt updated with plan. No questions or concerns at this time.

## 2018-03-13 NOTE — ED NOTES
Completed hourly rounding on pt. Pt reclining in bed, AAOx4, calm, cooperative, respirations unlabored with equal bilateral chest expansion, NAD noted. Pain, position and bathroom needs addressed. Pt connected to continuous pulse ox, BP cycling. Bed locked and in low position, side rails x 2. WCTM.

## 2018-03-13 NOTE — SUBJECTIVE & OBJECTIVE
Interval History:  Continues to have loose stools and abdominal cramping.  No other complaints.    Review of Systems   Constitutional: Negative for chills and fever.   Respiratory: Negative for cough and shortness of breath.    Cardiovascular: Negative for chest pain and palpitations.   Gastrointestinal: Positive for diarrhea.     Objective:     Vital Signs (Most Recent):  Temp: 97.6 °F (36.4 °C) (03/13/18 1528)  Pulse: 76 (03/13/18 1528)  Resp: 16 (03/13/18 1528)  BP: (!) 165/82 (03/13/18 1528)  SpO2: 99 % (03/13/18 1528) Vital Signs (24h Range):  Temp:  [97.3 °F (36.3 °C)-99 °F (37.2 °C)] 97.6 °F (36.4 °C)  Pulse:  [64-98] 76  Resp:  [13-20] 16  SpO2:  [97 %-100 %] 99 %  BP: (127-180)/(66-88) 165/82     Weight:  (150)  Body mass index is 25.58 kg/m².    Intake/Output Summary (Last 24 hours) at 03/13/18 1715  Last data filed at 03/13/18 0800   Gross per 24 hour   Intake                0 ml   Output              300 ml   Net             -300 ml      Physical Exam   Constitutional: He is oriented to person, place, and time. He appears well-developed.   Thin, frail, cachectic.  Voice hoarse.   HENT:   Head: Normocephalic.   Eyes: Conjunctivae are normal. Pupils are equal, round, and reactive to light.   Neck: Neck supple. No thyromegaly present.   Cardiovascular: Normal rate, regular rhythm, normal heart sounds and intact distal pulses.  Exam reveals no gallop and no friction rub.    No murmur heard.  Pulmonary/Chest: Effort normal and breath sounds normal.   Abdominal: Soft. He exhibits no distension. There is no tenderness.   Hyperactive bowel sounds.   Musculoskeletal: Normal range of motion. He exhibits no edema.   Lymphadenopathy:     He has no cervical adenopathy.   Neurological: He is alert and oriented to person, place, and time.   Strength equal and symmetric   Skin: Skin is warm and dry. No rash noted.   Psychiatric: He has a normal mood and affect. His behavior is normal. Thought content normal.        Significant Labs:   BMP:   Recent Labs  Lab 03/13/18  0533         K 5.4*      CO2 19*   BUN 22   CREATININE 1.9*   CALCIUM 8.3*   MG 1.6       Significant Imaging: I have reviewed all pertinent imaging results/findings within the past 24 hours.

## 2018-03-13 NOTE — ED NOTES
"Pt ambulated to restroom with slow and steady gait. Denies any diarrhea, states "passed some gas" denies abd pain. Placed soft care stretcher overlay on stretcher for pt comfort.   "

## 2018-03-13 NOTE — ASSESSMENT & PLAN NOTE
- likely intrarenal   - assess US, urine studies   - continue IV fluids   - no NSAIDs, ACEi, ARB  - monitor

## 2018-03-13 NOTE — ED NOTES
Pt resting w/ eyes closed and in no obvious distress. Will not disturb. Respirations are even and unlabored. No facial grimace, no moaning, no tachypnea, no tachycardia and no diaphoresis to indicate pt is in pain. Will continue to monitor closely.

## 2018-03-13 NOTE — ASSESSMENT & PLAN NOTE
- Patient with anorexia and weight loss   - Possibly associated with malignancy - workup in progress

## 2018-03-13 NOTE — ASSESSMENT & PLAN NOTE
- still elevated, but improved s/p hyperkalemia cocktail  - no EKG changes  - monitor on tele   - monitor BMP

## 2018-03-13 NOTE — ED NOTES
"Pt presents to ed with c/o abdominal cramping and diarrhea since around 1500 today. Pt reports nausea but denies any emesis. Pt states there was red in his stool but he is not sure if its " the strawberries I ate earlier".  Pt denies any dizziness or sob. Pt is placed on cont cardiac, bp and pulse ox monitoring. Bed is locked and in lowest position with side rails up x2. Call light is within reach. Family is at the bedside.   "

## 2018-03-13 NOTE — PLAN OF CARE
DAUGHTER # [id number]  [id number]  --------------------------------  ATTN: TEAM DC PLANNING     PT LIVES ALONE- OWNS DME : CANE      HX: HOME HEALTH [facility] -   RECOMMENDATIONS RESUM EIF MD TEAM AGREES IF NOT PT TO F/U WITH PCP      PCP : [facility]     TRANSPORT : [facility]  BUS AND DAUGHTER      NEEDS: INFO PER [facility] PN TRANSPORTATION PRIOR TO DC - PLEASE F/U PT STATED TERRANCE CALLED HIM  ABOUT IT - HE NEEDS MORE INFO      LAINEZ SIGNED - FILED IN ER MR [facility]      IF ANY ORTHER NEEDS ARISE PLEASE CONTACT RNCM/ SSW TEAM   THANK YOU ,  Marsha Salazar RN  Case management 3/13/844103:49 AM  # [phone] (FAX) [phone]        03/13/18 1427   Discharge Assessment   Assessment Type Discharge Planning Assessment   Confirmed/corrected address and phone number on facesheet? Yes   Assessment information obtained from? Patient   Communicated expected length of stay with patient/caregiver yes   Prior to hospitilization cognitive status: Alert/Oriented   Prior to hospitalization functional status: Independent   Current cognitive status: Alert/Oriented   Current Functional Status: Independent   Lives With alone   Able to Return to Prior Arrangements yes   Is patient able to care for self after discharge? Yes   Patient currently being followed by outpatient case management? No   Patient currently receives any other outside agency services? No   Do you have any problems affording any of your prescribed medications? TBD   Is the patient taking medications as prescribed? yes   Does the patient have transportation home? Yes   Does the patient receive services at the [facility]? No   Discharge Plan A Home Health   Discharge Plan B Home Health   Patient/Family In Agreement With Plan yes

## 2018-03-13 NOTE — HOSPITAL COURSE
Patient was started on IV antibiotics for colitis and GI was consulted to follow.  Patient had a similar episode to this late last year and a colonoscopy has already been scheduled for later this month (3/23).  EGD was done 3/15 given patient's apparent metastases and history of esophageal cancer.  This was negative for evidence of recurrent esophageal cancer.      Patient's benazepril was held for the hyperkalemia.  After 3 days the potassium level had decreased only to 4.9.  At this time he should not resume the benazepril given ongoing hyperkalemia.     CT of the chest was done to evaluate the best location for biopsy.  This had findings of a large pulmonary mass in the left upper lobe measuring nearly 9 cm.  There was also peribronchial and paraseptal thickening in the left upper lobe suspicious for lymphangitic carcinomatosis, and multiple scattered lucent and sclerotic lesions involving the spine and ribs with probable pathologic fracture of the left lateral 6th rib, all suspicious for metastatic disease.  I discussed these findings with the patient and with his daughter.  Patient was seen by Dr. Manzo with Oncology, and a plan was made for outpatient biopsy of the mass at the same time he is off his Plavix, in order to minimize the time he is off this.  He will follow up with her after the biopsy for the results and to plan for future treatment.    At discharge the patient's symptoms had resolved and he was tolerating a diet.  Stool studies were not done as by the time an attempt was made to collect stool he had stopped having diarrhea.  He will continue oral antibiotics for another 5 days.  Follow up as scheduled for colonoscopy on 3/23.  IR will be contacting him to arrange for the biopsy, likely on Monday 3/26.

## 2018-03-13 NOTE — HPI
Mr. Cruz is an 82 year old man who presented to the ED 3/12/18 with abdominal cramping and non-bloody diarrhea for about 7 hours.  In addition he has had anorexia and a 15 pound weight loss over the last month.  He has not had fever, vomiting, constipation or blood in the stool.  Significant labs included WBC 21.8K, potassium 6.5, BUN/creatinine 24/2 (at baseline).  CT was done and showed colitis from the splenic flexure to the rectum.  Additional findings included innumerable pulmonary nodules and lytic lesions in the lumbosacral spine, consistent with metastatic disease.  He was treated for hyperkalemia with insulin & glucose, bicarbonate and calcium gluconate, and was admitted for treatment of colitis.    Patient's history includes esophageal cancer diagnosed in 1997 and treated with radiation.  He has CKD IV and CAD.  He has undergone coronary artery stenting in 1999, 2010 and 2016.  He is followed by Dr. Reilly and is on Plavix.

## 2018-03-13 NOTE — ED PROVIDER NOTES
Encounter Date: 3/12/2018    SCRIBE #1 NOTE: I, Haylee Valle, am scribing for, and in the presence of, Dr. Grey.   SCRIBE #2 NOTE: I, Jenny Betancur, am scribing for, and in the presence of, Dr. Grey. the EKG reading.     History     Chief Complaint   Patient presents with    Abdominal Pain     Pt CO generalized abd pain since 1500 today, the pain is described as cramping, and has associated diarrhea, and nausea, denies vomiting.      Time seen by provider: 9:43 PM    This is a 82 y.o. male who presents with complaint of intermittent abdominal pain that began approximately seven hours ago. He reports abdominal cramps, diarrhea that temporarily relieved the pain, and decreased appetite, and denies fever, chills, nausea, myalgias, vomiting, constipation, and blood in stool.       The history is provided by the patient.     Review of patient's allergies indicates:  No Known Allergies  Past Medical History:   Diagnosis Date    Age-related osteoporosis without current pathological fracture 6/6/2017    Age-related osteoporosis without current pathological fracture 6/6/2017    Benign prostatic hyperplasia 05/10/2017    Cancer     throat    Cataract     CKD (chronic kidney disease) stage 3, GFR 30-59 ml/min 5/12/2017    Constipation 5/10/2017    Coronary artery disease involving native coronary artery of native heart without angina pectoris 05/10/2017    cards/Dr. Melva Reilly    H/O heart artery stent     LAD 9/22/16 Zotarolimus eluting coronary stent system- [facility]    History of throat cancer 1997    s/p surgery, XRT    Hypertension     Insomnia     Primary osteoarthritis of right knee 05/10/2017    sees ortho, knee replacement recommended     Past Surgical History:   Procedure Laterality Date    CATARACT EXTRACTION Right     july 2017    CORONARY ANGIOPLASTY WITH STENT PLACEMENT      LCx 1999, mid RCA 2010, LAD 9/2016; cards/Dr. Melva Reilly    THROAT SURGERY      2/2 throat cancer     Family  History   Problem Relation Age of Onset    Cancer Mother      stomach    Hypertension Mother     Hypertension Daughter     Hypertension Son     Hypertension Daughter     Blindness Neg Hx     Amblyopia Neg Hx     Cataracts Neg Hx     Glaucoma Neg Hx     Macular degeneration Neg Hx     Retinal detachment Neg Hx     Strabismus Neg Hx     Diabetes Neg Hx     Stroke Neg Hx     Thyroid disease Neg Hx     Colon cancer Neg Hx     Colon polyps Neg Hx     Esophageal cancer Neg Hx     Irritable bowel syndrome Neg Hx     Inflammatory bowel disease Neg Hx     Stomach cancer Neg Hx      Social History   Substance Use Topics    Smoking status: Former Smoker     Years: 50.00     Types: Cigarettes    Smokeless tobacco: Never Used    Alcohol use No     Review of Systems   Constitutional: Negative for appetite change (Decreased.) and fever.   HENT: Negative for sore throat.    Respiratory: Negative for shortness of breath.    Cardiovascular: Negative for chest pain.   Gastrointestinal: Positive for abdominal pain and diarrhea. Negative for blood in stool, constipation, nausea and vomiting.        Positive for abdominal cramping.   Genitourinary: Negative for dysuria.   Musculoskeletal: Negative for back pain and myalgias.   Skin: Negative for rash.   Neurological: Negative for weakness.   Hematological: Does not bruise/bleed easily.       Physical Exam     Initial Vitals [03/12/18 1927]   BP Pulse Resp Temp SpO2   (!) 161/72 86 18 97.3 °F (36.3 °C) 98 %      MAP       101.67         Physical Exam    Nursing note and vitals reviewed.  Constitutional: He appears well-developed and well-nourished. He is not diaphoretic. No distress.   HENT:   Head: Normocephalic and atraumatic.   Nose: Nose normal.   Mouth/Throat: Oropharynx is clear and moist.   Eyes: Conjunctivae and EOM are normal. Pupils are equal, round, and reactive to light. No scleral icterus.   Neck: Normal range of motion. Neck supple. No JVD present.    Cardiovascular: Normal rate, regular rhythm and normal heart sounds. Exam reveals no gallop and no friction rub.    No murmur heard.  Pulmonary/Chest: Breath sounds normal. No stridor. No respiratory distress. He has no wheezes. He exhibits no tenderness.   Abdominal: Soft. Bowel sounds are normal. He exhibits no distension and no mass. There is no tenderness. There is no rebound and no guarding.   Musculoskeletal: Normal range of motion. He exhibits no edema or tenderness.   Lymphadenopathy:     He has no cervical adenopathy.   Neurological: He is alert and oriented to person, place, and time. He has normal strength. No cranial nerve deficit.   Skin: Skin is warm and dry. No rash noted. No pallor.   Psychiatric: He has a normal mood and affect. Thought content normal.         ED Course   Procedures  Labs Reviewed   CBC W/ AUTO DIFFERENTIAL - Abnormal; Notable for the following:        Result Value    WBC 21.77 (*)     MCV 81 (*)     MCH 25.0 (*)     MCHC 30.9 (*)     RDW 18.2 (*)     Platelets 534 (*)     Gran # (ANC) 20.3 (*)     Lymph # 0.7 (*)     Gran% 93.3 (*)     Lymph% 3.2 (*)     Mono% 3.3 (*)     Platelet Estimate Increased (*)     All other components within normal limits   COMPREHENSIVE METABOLIC PANEL - Abnormal; Notable for the following:     Potassium 6.5 (*)     CO2 21 (*)     Glucose 122 (*)     BUN, Bld 24 (*)     Creatinine 2.0 (*)     eGFR if  35 (*)     eGFR if non  30 (*)     All other components within normal limits    Narrative:       Potassium critical result(s) called and verbal readback obtained   from Nivia Abreu , 03/12/2018 22:44   POTASSIUM - Abnormal; Notable for the following:     Potassium 7.1 (*)     All other components within normal limits    Narrative:       K  critical result(s) called and verbal readback obtained from   Buzz Peguero, 03/12/2018 23:16   POCT GLUCOSE - Abnormal; Notable for the following:     POCT Glucose 133 (*)     All  other components within normal limits   LIPASE   URINALYSIS     EKG Readings: (Independently Interpreted)   Initial Reading: No STEMI.   Normal sinus rhythm at a rate of 74 bpm. Artifact present.        X-Rays:   Independently Interpreted Readings:   Abdomen:   Flat and Erect of Abdomen - No acute findings.     Medical Decision Making:   Clinical Tests:   Lab Tests: Ordered and Reviewed  Radiological Study: Reviewed and Ordered  Medical Tests: Ordered and Reviewed    Imaging Results          CT Abdomen Pelvis  Without Contrast (Final result)     Abnormal  Result time 03/13/18 01:04:55    Final result by Jagdish Tinajero MD (03/13/18 01:04:55)                 Impression:       Wall thickening and extending from the splenic flexure to the rectum. The findings are consistent with nonspecific colitis ( infectious , inflammatory, and ischemic colitis).    Innumerable pulmonary nodules and lytic lesions in the lumbosacral spine, consistent with metastatic disease. The source of the metastatic disease is not evident on this CT scan. Endoscopic Correlation is suggested after the patient's acute symptoms resolve. Age-appropriate malignancy screening is also suggested.     Ectasia of infrarenal abdominal aorta with a 10 mm outpouching along the posterior lateral wall of the abdominal aorta. 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 No CT evidence of rupture. Dedicated CT angiogram of the abdomen is suggested when the patient is able to receive IV contrast.    Additional findings as above.    Report has been flagged in the EPIC medical record system.          Electronically signed by: JAGDISH TINAJERO MD  Date:     03/13/18  Time:    01:04              Narrative:    Exam: [id number]  03/13/18  00:19:00 [id number] ([facility]) : CT ABDOMEN PELVIS WITHOUT CONTRAST    Technique:     Using helical CT technique, axial images of the abdomen and pelvis were obtained from the top of the liver through the base of the bladder. Oral contrast was  administered. Coronal and Sagittal Reformats were also obtained.    Comparison:    Abdomen x-ray dated 3/12/18    Findings:      There are no pleural effusions. There is no evidence of a pneumothorax. There are innumerable subcentimeter nodules in the lung bases. There is a 2.5 cm complex cystic cavity in the left lung base.    The heart is unremarkable. There is ectasia of the infrarenal abdominal aorta measuring 2.6 x 2.8 cm. There is a 10 mm outpouching involving the posterior lateral wall of the infrarenal abdominal aorta. There are extensive calcifications along the course of the abdominal aorta and its branch vessels. Noncontrast assessment of the mesenteric vessels and the portal vein are grossly unremarkable. There aneurysm dilatation of the of the bilateral common femoral veins. There is no evidence of lymphadenopathy in the abdomen or pelvis.    The esophagus, stomach, and the duodenum are within normal limits. The small bowel loops are unremarkable. The visualized portions of the appendix is within normal limits. There is extensive colonic diverticula. There is wall thickening and extending from the splenic flexure to the rectum. No evidence of pneumatosis is identified.    No discrete hepatic mass is identified on this noncontrast examination. There is cholelithiasis. The biliary tree is within normal limits. The spleen is enlarged. There are calcifications within the spleen. There is fatty atrophy of the pancreas. The adrenal glands are within normal limits.    Both kidneys are atrophic. There is a 3 mm nonobstructing stone in upper pole the left kidney. The ureters and bladder unremarkable. The prostate gland is within normal limits.    There is no evidence of free fluid in the abdomen or pelvis. There is no evidence of free air.    The psoas margins are unremarkable. The abdominal wall is within normal limits. There are degenerative changes in the osseous structures. There is mixed sclerotic and lytic  lesion of the L5 vertebral body. There are innumerable lytic lesions throughout the lumbosacral bones.                             X-Ray Abdomen Flat And Erect (Final result)  Result time 03/12/18 23:10:36    Final result by Dale Ch MD (03/12/18 23:10:36)                 Impression:    Nonobstructive bowel gas pattern.      Electronically signed by: Dale Ch  Date:     03/12/18  Time:    23:10              Narrative:    EXAM: ABDOMEN FLAT AND UPRIGHT    COMPARISON:  12/21/2017.    TECHNIQUE: Flat and upright views of the abdomen were obtained.    FINDINGS:  Cardiac wires overlie the upper abdomen. No large volume pneumoperitoneum is identified on the limited upright view. Bowel gas pattern is nonobstructive. No suspicious calcifications are detected. Degenerative changes are present in the lumbar spine.                                      Scribe Attestation:   Scribe #1: I performed the above scribed service and the documentation accurately describes the services I performed. I attest to the accuracy of the note.    Attending Attestation:           Physician Attestation for Scribe:  Physician Attestation Statement for Scribe #1: I, Dr. Grey, reviewed documentation, as scribed by Haylee Valle in my presence, and it is both accurate and complete.         Attending ED Notes:   Emergent evaluation of 82-year-old male with complaint of generalized abdominal pain and diarrhea.  Patient is afebrile, nontoxic-appearing with stable vital signs.  Patient is white blood cell count near 22,000.  Platelets of 534.  H&H is within normal limits.  Potassium is 7.1.  No hemolysis.  Hyperkalemia is treated with dextrose, insulin, bicarbonate and calcium gluconate.  BUN/creatinine are 24 and 2.0.  CO2 of 21.  No acute findings on abdominal x-ray.  CT scan reveals colitis, pulmonary nodules and lytic lesions in the lumbosacral spine consistent with metastatic disease.  Patient also found to have pseudoaneurysm  of the abdominal aorta.  The patient is extensive the counseled on his diagnosis and treatment including all diagnostic, laboratory and physical exam findings.  The patient is admitted in critical condition.      1:15 AM consulted and discussed patient with jj on call, Aniyah Lara who will admit the patient to Dr. Prado.    Dr. Bird will follow up on the urinalysis and chest xray when the results are ready and treat accordingly.             Clinical Impression:     1. Hyperkalemia    2. Diarrhea, unspecified type    3. Abdominal pain, generalized    4. Renal insufficiency    5. Renal calculus, right    6. Pseudoaneurysm of aorta    7. Pulmonary nodules    8. Lytic bone lesions on xray    9. Colitis                                 Ga Cordero MD  03/13/18 0116       Ga Cordero MD  03/13/18 0139

## 2018-03-13 NOTE — ED NOTES
Pt rounding complete. Pt updated on POC. Pt verbalized understanding and had no further questions.  Pt denies any pain or needs at the moment. Pt does not appear to be in acute distress. Respirations are even and unlabored. VSS. Bed is locked and in lowest position with side rails up x2. Call light is within reach. Will continue to monitor.

## 2018-03-14 NOTE — ASSESSMENT & PLAN NOTE
- Hold off on workup with biopsy until patient off Plavix.  - Will need to arrange for outpatient follow up after acute colitis resolved.

## 2018-03-14 NOTE — PROGRESS NOTES
[facility]  Adult Nutrition  Progress Note    SUMMARY       Recommendations    Recommendation/Intervention:   1. Recommend dental soft diet without additional restrictions at this time   2. Recommend Boost Plus tid    Goals:   1. Meet >85% EEN and EPN   2. Prevent further wt loss   3. Promote nutrition related labs wnl    Nutrition Goal Status: new  Communication of RD Recs: discussed on rounds    Reason for Assessment    Reason for Assessment: identified at risk by screening criteria  Diagnosis:  1. Hyperkalemia    2. Diarrhea, unspecified type    3. Abdominal pain, generalized    4. Renal insufficiency    5. Renal calculus, right    6. Pseudoaneurysm of aorta    7. Pulmonary nodules    8. Lytic bone lesions on xray    9. Colitis    10. Colitis, acute      Past Medical History:   Diagnosis Date    Age-related osteoporosis without current pathological fracture 6/6/2017    Benign prostatic hyperplasia 05/10/2017    Cataract     CKD (chronic kidney disease) stage 3, GFR 30-59 ml/min 5/12/2017    Coronary artery disease involving native coronary artery of native heart without angina pectoris 05/10/2017    cards/Dr. Melva Reilly    Esophageal cancer 1997    s/p surgery, XRT    H/O heart artery stent     LAD 9/22/16 Zotarolimus eluting coronary stent system- [facility]    Hypertension     Primary osteoarthritis of right knee 05/10/2017    sees ortho, knee replacement recommended      Interdisciplinary Rounds: attended    General Information Comments: Pt endorses poor appetite with 15lb wt loss x 1 month PTA. Pt c/o difficulty chewing, prefers softer foods. Noted with diarrhea upon admission. Nutrition focused physical assessment performed, pt has moderate muscle and fat depletion of clavicle, temples, triceps, and lower extremities.     Nutrition Discharge Planning: d/c on dental soft diet + oral supplements prn    Nutrition Risk Screen    Nutrition Risk Screen: other (see comments) (pt admits  "recent weight loss x 1 month; history of throat cx)    Nutrition/Diet History    Do you have any cultural, spiritual, Mormon conflicts, given your current situation?: none  Factors Affecting Nutritional Intake: decreased appetite, altered gastrointestinal function    Anthropometrics    Temp: 98 °F (36.7 °C)  Height Method: Stated  Height: 5' 4" (162.6 cm)  Height (inches): 64 in  Weight Method: Bed Scale  Weight: 67.4 kg (148 lb 8 oz)  Weight (lb): 148.5 lb  Ideal Body Weight (IBW), Male: 130 lb  % Ideal Body Weight, Male (lb): 114.23 lb  BMI (Calculated): 25.5  BMI Grade: 25 - 29.9 - overweight    Lab/Procedures/Meds    Pertinent Labs Reviewed: reviewed  Lab Results   Component Value Date     03/14/2018    K 5.1 03/14/2018     (H) 03/14/2018    CO2 18 (L) 03/14/2018    BUN 18 03/14/2018    CREATININE 1.8 (H) 03/14/2018    CALCIUM 8.3 (L) 03/14/2018    PHOS 2.6 (L) 03/14/2018    MG 1.7 03/14/2018    ESTGFRAFRICA 40 (A) 03/14/2018    EGFRNONAA 35 (A) 03/14/2018    ALBUMIN 3.9 03/12/2018     POCT Glucose   Date Value Ref Range Status   03/12/2018 133 (H) 70 - 110 mg/dL Final     Lab Results   Component Value Date    HCT 37.9 (L) 03/14/2018     Lab Results   Component Value Date    HGB 12.0 (L) 03/14/2018      Pertinent Medications Reviewed: reviewed  Scheduled Meds:   amLODIPine  5 mg Oral Daily    aspirin  81 mg Oral Daily    ciprofloxacin  400 mg Intravenous Q24H    clopidogrel  75 mg Oral Daily    metoprolol succinate  50 mg Oral Daily    metroNIDAZOLE  500 mg Oral Q8H    polyethylene glycol  17 g Oral Daily    pravastatin  40 mg Oral Daily    tamsulosin  0.4 mg Oral Daily     Continuous Infusions:   sodium chloride 0.9% 100 mL/hr at 03/14/18 0542     Physical Findings/Assessment    Overall Physical Appearance: loss of muscle mass, loss of subcutaneous fat  Oral/Mouth Cavity: tooth/teeth missing  Skin: intact    Estimated/Assessed Needs    Weight Used For Calorie Calculations: 67.5 kg (148 " "lb 13 oz)  Height: 5' 4" (162.6 cm)  Energy Calorie Requirements (kcal): 2651-5886  Energy Need Method: Kcal/kg (25-30 kcal/kg)    RMR (DuPage-St. Jeor Equation): 1291  Protein Requirements: 68-81 gm/d (1-1.2 gm/kg)  Weight Used For Protein Calculations: 67.4 kg (148 lb 9.4 oz)    Fluid Requirements (mL): 1690 (or per team)  Fluid Need Method: RDA Method  RDA Method (mL): 1690     Nutrition Prescription Ordered    Current Diet Order: low Na    Evaluation of Received Nutrient/Fluid Intake     % Intake of Estimated Energy Needs: 25 - 50 %  % Meal Intake: 25 - 50 %    Nutrition Risk    Level of Risk/Frequency of Follow-up: high     Assessment and Plan    Malnutrition of moderate degree    Malnutrition in the context of Chronic Illness/Injury    Related to (etiology):  Poor appetite    Signs and Symptoms (as evidenced by):  moderate muscle and fat depletion of clavicle, temples, triceps, and lower extremities, report of 9% wt loss x 1 month    Interventions/Recommendations (treatment strategy):  Dental soft diet + Boost Plus tid; See RD note 3/14/2018 for full recs     Nutrition Diagnosis Status:  New           Monitor and Evaluation    Food and Nutrient Intake: energy intake, food and beverage intake  Food and Nutrient Adminstration: diet order  Physical Activity and Function: nutrition-related ADLs and IADLs  Anthropometric Measurements: weight, weight change  Biochemical Data, Medical Tests and Procedures: electrolyte and renal panel, gastrointestinal profile, glucose/endocrine profile, inflammatory profile, lipid profile  Nutrition-Focused Physical Findings: overall appearance, extremities, muscles and bones, skin     Nutrition Follow-Up    RD Follow-up?: Yes    Danette Machuca, MS, RD, LDN   Dietitian, [facility]  [phone]   "

## 2018-03-14 NOTE — ASSESSMENT & PLAN NOTE
- Inflammation noted from sigmoid colon to rectum on CT   - Multiple episodes of diarrhea, improving today  - Continue IV Cipro/Flagyl  - GI following, to do EGD tomorrow.  - Colonoscopy planned for later this month.

## 2018-03-14 NOTE — PLAN OF CARE
Problem: Patient Care Overview  Goal: Individualization & Mutuality  Outcome: Ongoing (interventions implemented as appropriate)  Bed in low and locked position and able to reposition and amb per self.  Remains free of injury during shift.

## 2018-03-14 NOTE — NURSING
Patient c/o diarrhea with no relief  Tiffany Crocker NP notified and chart reviewed  Patient given medication downstairs in ED to help lower K+ blood levels as well   Patient wants something to stop diarrhea, however may be contradicted at this time due to high K+ levels    Will cont to monitor  No further orders at this time

## 2018-03-14 NOTE — PROGRESS NOTES
[facility] Hospital Medicine  Progress Note    Patient Name: Neville Cruz  MRN: [id number]  Patient Class: IP- Inpatient   Admission Date: 3/12/2018  Length of Stay: 1 days  Attending Physician: Ebonie Grimes MD;Ji*  Primary Care Provider: Liberty Berger MD        Subjective:     Principal Problem:Colitis, acute    HPI:  Mr. Cruz is an 82 year old man who presented to the ED 3/12/18 with abdominal cramping and non-bloody diarrhea for about 7 hours.  In addition he has had anorexia and a 15 pound weight loss over the last month.  He has not had fever, vomiting, constipation or blood in the stool.  Significant labs included WBC 21.8K, potassium 6.5, BUN/creatinine 24/2 (at baseline).  CT was done and showed colitis from the splenic flexure to the rectum.  Additional findings included innumerable pulmonary nodules and lytic lesions in the lumbosacral spine, consistent with metastatic disease.  He was treated for hyperkalemia with insulin & glucose, bicarbonate and calcium gluconate, and was admitted for treatment of colitis.    Patient's history includes esophageal cancer diagnosed in 1997 and treated with radiation.  He has CKD IV and CAD.  He has undergone coronary artery stenting in 1999, 2010 and 2016.  He is followed by Dr. Gonzalez and is on Plavix.        Hospital Course:  Patient was started on IV antibiotics for colitis and GI was consulted to follow.  Patient had a similar episode to this late last year and a colonoscopy has already been scheduled for later this month (3/23).  EGD was planned for this hospitalization given patient's apparent metastases and history of esophageal cancer.  Notably he is on Plavix, which will be held for the colonoscopy later this month.    Interval History:  Feeling better.  Stool studies ordered but he has not had any stools yet today.  Passed some gas.    Review of Systems   Constitutional: Negative for chills and fever.   Respiratory: Negative  for cough and shortness of breath.    Cardiovascular: Negative for chest pain and palpitations.     Objective:     Vital Signs (Most Recent):  Temp: 96.7 °F (35.9 °C) (03/14/18 1610)  Pulse: 73 (03/14/18 1610)  Resp: 18 (03/14/18 1610)  BP: 139/67 (03/14/18 1610)  SpO2: 99 % (03/14/18 1610) Vital Signs (24h Range):  Temp:  [96.7 °F (35.9 °C)-98.6 °F (37 °C)] 96.7 °F (35.9 °C)  Pulse:  [61-88] 73  Resp:  [18] 18  SpO2:  [95 %-99 %] 99 %  BP: (139-168)/(67-88) 139/67     Weight: 67.4 kg (148 lb 8 oz)  Body mass index is 25.49 kg/m².    Intake/Output Summary (Last 24 hours) at 03/14/18 1722  Last data filed at 03/14/18 0600   Gross per 24 hour   Intake             1600 ml   Output              300 ml   Net             1300 ml      Physical Exam   Constitutional: He is oriented to person, place, and time. He appears well-developed.   Thin, frail, cachectic.  Voice hoarse.   HENT:   Head: Normocephalic.   Eyes: Conjunctivae are normal. Pupils are equal, round, and reactive to light.   Neck: Neck supple. No thyromegaly present.   Cardiovascular: Normal rate, regular rhythm, normal heart sounds and intact distal pulses.  Exam reveals no gallop and no friction rub.    No murmur heard.  Pulmonary/Chest: Effort normal and breath sounds normal.   Abdominal: Soft. He exhibits no distension. There is no tenderness.   Hyperactive bowel sounds.   Musculoskeletal: Normal range of motion. He exhibits no edema.   Lymphadenopathy:     He has no cervical adenopathy.   Neurological: He is alert and oriented to person, place, and time.   Strength equal and symmetric   Skin: Skin is warm and dry. No rash noted.   Psychiatric: He has a normal mood and affect. His behavior is normal. Thought content normal.       Significant Labs: All pertinent labs within the past 24 hours have been reviewed.    Significant Imaging: I have reviewed all pertinent imaging results/findings within the past 24 hours.    Assessment/Plan:      * Colitis, acute     - Inflammation noted from sigmoid colon to rectum on CT   - Multiple episodes of diarrhea, improving today  - Continue IV Cipro/Flagyl  - GI following, to do EGD tomorrow.  - Colonoscopy planned for later this month.          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  - Will need to arrange for outpatient follow up after acute colitis resolved.          CKD (chronic kidney disease) stage 3, GFR 30-59 ml/min    - baseline Cr appears to be 1.8  - avoid nephrotoxins  - renally dose meds   - monitor         Benign essential HTN    - BP elevated upon admission  - continue BP meds except ACEi/ARB  - hydralazine PRN  - monitor        Coronary artery disease involving native coronary artery of native heart without angina pectoris    - continue ASA, statin        History of throat cancer    - Esophageal cancer diagnosed and treated with XRT in 1997.  - Has not had recent follow up, saw an ENT in [place] a couple of years ago  - Plan for biopsies at a later date when patient off Plavix for colonoscopy            VTE Risk Mitigation         Ordered     Medium Risk of VTE  Once      03/13/18 0803     Reason for No Pharmacological VTE Prophylaxis  Once      03/13/18 0803     Place sequential compression device  Until discontinued      03/13/18 0237              Ebonie Contreras MD  Department of Hospital Medicine   [facility], [place], operated by [facility]

## 2018-03-14 NOTE — SUBJECTIVE & OBJECTIVE
Interval History:  Feeling better.  Stool studies ordered but he has not had any stools yet today.  Passed some gas.    Review of Systems   Constitutional: Negative for chills and fever.   Respiratory: Negative for cough and shortness of breath.    Cardiovascular: Negative for chest pain and palpitations.     Objective:     Vital Signs (Most Recent):  Temp: 96.7 °F (35.9 °C) (03/14/18 1610)  Pulse: 73 (03/14/18 1610)  Resp: 18 (03/14/18 1610)  BP: 139/67 (03/14/18 1610)  SpO2: 99 % (03/14/18 1610) Vital Signs (24h Range):  Temp:  [96.7 °F (35.9 °C)-98.6 °F (37 °C)] 96.7 °F (35.9 °C)  Pulse:  [61-88] 73  Resp:  [18] 18  SpO2:  [95 %-99 %] 99 %  BP: (139-168)/(67-88) 139/67     Weight: 67.4 kg (148 lb 8 oz)  Body mass index is 25.49 kg/m².    Intake/Output Summary (Last 24 hours) at 03/14/18 1722  Last data filed at 03/14/18 0600   Gross per 24 hour   Intake             1600 ml   Output              300 ml   Net             1300 ml      Physical Exam   Constitutional: He is oriented to person, place, and time. He appears well-developed.   Thin, frail, cachectic.  Voice hoarse.   HENT:   Head: Normocephalic.   Eyes: Conjunctivae are normal. Pupils are equal, round, and reactive to light.   Neck: Neck supple. No thyromegaly present.   Cardiovascular: Normal rate, regular rhythm, normal heart sounds and intact distal pulses.  Exam reveals no gallop and no friction rub.    No murmur heard.  Pulmonary/Chest: Effort normal and breath sounds normal.   Abdominal: Soft. He exhibits no distension. There is no tenderness.   Hyperactive bowel sounds.   Musculoskeletal: Normal range of motion. He exhibits no edema.   Lymphadenopathy:     He has no cervical adenopathy.   Neurological: He is alert and oriented to person, place, and time.   Strength equal and symmetric   Skin: Skin is warm and dry. No rash noted.   Psychiatric: He has a normal mood and affect. His behavior is normal. Thought content normal.       Significant Labs:  All pertinent labs within the past 24 hours have been reviewed.    Significant Imaging: I have reviewed all pertinent imaging results/findings within the past 24 hours.

## 2018-03-14 NOTE — PLAN OF CARE
Problem: Patient Care Overview  Goal: Plan of Care Review  Outcome: Ongoing (interventions implemented as appropriate)  Patient resting in bed at this time, all items within reach, call bell in reach and instructed to call for help, verbalized understanding, night light on, bed in lowest and locked position, non skid footwear on, bed alarm used as needed, patient free from injuries     Room air, tolerating  Diarrhea, moonlighter Obi NP aware, no further orders at this time, monitor   NS at 100  Tele reading NSR   No wounds noted     No further complaints or concerns at this time, will cont to monitor

## 2018-03-14 NOTE — PLAN OF CARE
Problem: Patient Care Overview  Goal: Plan of Care Review  Outcome: Ongoing (interventions implemented as appropriate)  Recommendation/Intervention:   1. Recommend dental soft diet without additional restrictions at this time   2. Recommend Boost Plus tid     Goals:   1. Meet >85% EEN and EPN   2. Prevent further wt loss   3. Promote nutrition related labs wnl

## 2018-03-14 NOTE — CONSULTS
Consult Note  Gastroenterology    Consult Requested By: Dr Grimes  Reason for Consult: Anemia, colitis    SUBJECTIVE:     History of Present Illness:  Patient is a 82 y.o. male who presents with abd pain, diarrhea. Onset of symptoms was abrupt starting 1 day ago with gradually worsening course since that time. Patient denies dysphagia, odynophagia and reflux symptoms. Symptoms are aggravated by none. Symptoms improve with none. Past history includes throat cancer s/p xrt. Previous studies include CT scan. Pt is scheduled for a Out Pt Colnoscopy/EGD    Review of patient's allergies indicates:  No Known Allergies    Scheduled Meds:   amLODIPine  5 mg Oral Daily    aspirin  81 mg Oral Daily    ciprofloxacin  400 mg Intravenous Q24H    clopidogrel  75 mg Oral Daily    metoprolol succinate  50 mg Oral Daily    metroNIDAZOLE  500 mg Oral Q8H    polyethylene glycol  17 g Oral Daily    pravastatin  40 mg Oral Daily    tamsulosin  0.4 mg Oral Daily       Continuous Infusions:   sodium chloride 0.9% 100 mL/hr at 03/14/18 0542       PRN Meds:.  acetaminophen, hydrALAZINE, hydrocodone-acetaminophen 5-325mg, ondansetron, ramelteon, sodium chloride 0.9%    Past Medical History:   Diagnosis Date    Age-related osteoporosis without current pathological fracture 6/6/2017    Benign prostatic hyperplasia 05/10/2017    Cataract     CKD (chronic kidney disease) stage 3, GFR 30-59 ml/min 5/12/2017    Coronary artery disease involving native coronary artery of native heart without angina pectoris 05/10/2017    cards/Dr. Melva Reilly    Esophageal cancer 1997    s/p surgery, XRT    H/O heart artery stent     LAD 9/22/16 Zotarolimus eluting coronary stent system- [employer]    Hypertension     Primary osteoarthritis of right knee 05/10/2017    sees ortho, knee replacement recommended       Past Surgical History:   Procedure Laterality Date    CATARACT EXTRACTION Right     july 2017    CORONARY ANGIOPLASTY WITH STENT  PLACEMENT      LCx 1999, mid RCA 2010, LAD 9/2016; cards/Dr. Melva Reilly    THROAT SURGERY      2/2 throat cancer       Family History   Problem Relation Age of Onset    Cancer Mother      stomach    Hypertension Mother     Hypertension Daughter     Hypertension Son     Hypertension Daughter     Blindness Neg Hx     Amblyopia Neg Hx     Cataracts Neg Hx     Glaucoma Neg Hx     Macular degeneration Neg Hx     Retinal detachment Neg Hx     Strabismus Neg Hx     Diabetes Neg Hx     Stroke Neg Hx     Thyroid disease Neg Hx     Colon cancer Neg Hx     Colon polyps Neg Hx     Esophageal cancer Neg Hx     Irritable bowel syndrome Neg Hx     Inflammatory bowel disease Neg Hx     Stomach cancer Neg Hx        Social History     Social History    Marital status:      Spouse name: N/A    Number of children: N/A    Years of education: N/A     Social History Main Topics    Smoking status: Former Smoker     Years: 50.00     Types: Cigarettes    Smokeless tobacco: Never Used    Alcohol use No    Drug use: No    Sexual activity: No     Other Topics Concern    None     Social History Narrative        Moved from [place] 2016; lives alone    3 children: Daughter Clemencia; granddaughter Fabian    No regular exercise, ambulates with cane 2/2 R knee OA    Reports pneumococcal done after 66       Review of Systems:  Constitutional: no fever or chills  Eyes: no visual changes  ENT: no nasal congestion or sore throat  Respiratory: no cough or shorness of breath  Cardiovascular: no chest pain or palpitations  Gastrointestinal: no nausea or vomiting  Genitourinary: no hematuria or dysuria    OBJECTIVE:   Physical Exam:  General: Well developed, well nourished, no apparent distress  Vital Signs Range (Last 24H):  Temp:  [97.6 °F (36.4 °C)-98.6 °F (37 °C)]   Pulse:  [61-98]   Resp:  [15-18]   BP: (140-177)/(66-88)   SpO2:  [95 %-100 %]   HEENT: Anicteric, PERRLA  CVS: S1, S2, no  murmers/rubs  Lungs: CTA-B, normal excursion  Abdomen: Soft, mild diffuse tenderness, ND, normal BS's  Extremities: No c/c/e, 1+ DP pulses to BLE's  Skin: No rashes/lesions.      Laboratory:    CBC:   Recent Labs  Lab 03/14/18  0544   WBC 10.91   RBC 4.67   HGB 12.0*   HCT 37.9*   *   MCV 81*   MCH 25.7*   MCHC 31.7*     CMP:   Recent Labs  Lab 03/12/18  2208  03/14/18  0544   *  < > 94   CALCIUM 9.0  < > 8.3*   ALBUMIN 3.9  --   --    PROT 7.3  --   --      < > 140   K 6.5*  < > 5.1   CO2 21*  < > 18*     < > 116*   BUN 24*  < > 18   CREATININE 2.0*  < > 1.8*   ALKPHOS 129  --   --    ALT 14  --   --    AST 17  --   --    BILITOT 0.8  --   --    < > = values in this interval not displayed.  Coagulation: No results for input(s): LABPROT, INR, APTT in the last 168 hours.    Recent Results (from the past 336 hour(s))   CBC with Automated Differential    Collection Time: 03/14/18  5:44 AM   Result Value Ref Range    WBC 10.91 3.90 - 12.70 K/uL    Hemoglobin 12.0 (L) 14.0 - 18.0 g/dL    Hematocrit 37.9 (L) 40.0 - 54.0 %    Platelets 364 (H) 150 - 350 K/uL   CBC with Automated Differential    Collection Time: 03/13/18  5:43 AM   Result Value Ref Range    WBC 13.60 (H) 3.90 - 12.70 K/uL    Hemoglobin 12.3 (L) 14.0 - 18.0 g/dL    Hematocrit 39.2 (L) 40.0 - 54.0 %    Platelets 421 (H) 150 - 350 K/uL   CBC W/ AUTO DIFFERENTIAL    Collection Time: 03/12/18 10:08 PM   Result Value Ref Range    WBC 21.77 (H) 3.90 - 12.70 K/uL    Hemoglobin 14.6 14.0 - 18.0 g/dL    Hematocrit 47.3 40.0 - 54.0 %    Platelets 534 (H) 150 - 350 K/uL      No results for input(s): APTT, INR, PTT in the last 168 hours.    Recent Labs  Lab 03/12/18  2208 03/12/18  2256 03/13/18  0533 03/14/18  0544     --  136 140   K 6.5* 7.1* 5.4* 5.1     --  109 116*   CO2 21*  --  19* 18*   BUN 24*  --  22 18   CREATININE 2.0*  --  1.9* 1.8*   CALCIUM 9.0  --  8.3* 8.3*   PROT 7.3  --   --   --    BILITOT 0.8  --   --   --     ALKPHOS 129  --   --   --    ALT 14  --   --   --    AST 17  --   --   --     No results found for: HAV, HEPAIGM, HEPBIGM, HEPBCAB, HBEAG, HEPCAB No results found for: AFP   No results found for: CEA       Diagnostic Results:  CT: I have personally reviewed the report    ASSESSMENT/PLAN:     1. Hyperkalemia    2. Diarrhea, unspecified type    3. Abdominal pain, generalized    4. Renal insufficiency    5. Renal calculus, right    6. Pseudoaneurysm of aorta    7. Pulmonary nodules    8. Lytic bone lesions on xray    9. Colitis    10. Colitis, acute        Plan: 1) check stool for c diff  2) Check CEA, Ca 19-9 level  3) EGD in am

## 2018-03-14 NOTE — ASSESSMENT & PLAN NOTE
Malnutrition in the context of Chronic Illness/Injury    Related to (etiology):  Poor appetite    Signs and Symptoms (as evidenced by):  moderate muscle and fat depletion of clavicle, temples, triceps, and lower extremities, report of 9% wt loss x 1 month    Interventions/Recommendations (treatment strategy):  Dental soft diet + Boost Plus tid; See RD note 3/14/2018 for full recs     Nutrition Diagnosis Status:  New

## 2018-03-15 NOTE — ANESTHESIA PREPROCEDURE EVALUATION
03/15/2018  Neville Cruz is a 82 y.o., male.    Anesthesia Evaluation    I have reviewed the Patient Summary Reports.    I have reviewed the Nursing Notes.   I have reviewed the Medications.     Review of Systems  Anesthesia Hx:  No problems with previous Anesthesia  History of prior surgery of interest to airway management or planning: Previous anesthesia: General Laryngeal Ca Surg w Trach with general anesthesia.  Denies Family Hx of Anesthesia complications.   Denies Personal Hx of Anesthesia complications.   Social:  Former Smoker    Hematology/Oncology:         -- Anemia:   EENT/Dental:EENT/Dental Normal   Cardiovascular:   Exercise tolerance: poor Hypertension, well controlled CAD  CABG/stent  hyperlipidemia    Pulmonary:  Pulmonary Normal    Renal/:   Chronic Renal Disease, CRI    Hepatic/GI:  Hepatic/GI Normal    Musculoskeletal:   Arthritis     Neurological:  Neurology Normal    Endocrine:  Endocrine Normal    Dermatological:  Skin Normal    Psych:  Psychiatric Normal           Physical Exam  General:  Cachexia    Airway/Jaw/Neck:  Airway Findings: Mouth Opening: Normal Tongue: Normal  General Airway Assessment: Possible difficult intubation  Mallampati: II      Dental:  Dental Findings: Periodontal disease, Severe        Mental Status:  Mental Status Findings:  Cooperative, Alert and Oriented         Anesthesia Plan  Type of Anesthesia, risks & benefits discussed:  Anesthesia Type:  general  Patient's Preference:   Intra-op Monitoring Plan:   Intra-op Monitoring Plan Comments:   Post Op Pain Control Plan:   Post Op Pain Control Plan Comments:   Induction:   IV  Beta Blocker:         Informed Consent: Patient understands risks and agrees with Anesthesia plan.  Questions answered. Anesthesia consent signed with patient.  ASA Score: 3     Day of Surgery Review of History & Physical:    H&P update  referred to the surgeon.     Anesthesia Plan Notes: Prev laryngeal ca w trach        Ready For Surgery From Anesthesia Perspective.

## 2018-03-15 NOTE — CONSULTS
"[facility]  Hematology/Oncology  Consult Note    Patient Name: Neville Cruz  MRN: [id number]  Admission Date: 3/12/2018  Hospital Length of Stay: 2 days  Code Status: Full Code   Attending Provider: Chance Grimes MD  Consulting Provider: Heather Spence MD  Primary Care Physician: Liberty Berger MD  Principal Problem:Colitis, acute    Inpatient consult to Hematology/Oncology  Consult performed by: HEATHER SPENCE  Consult ordered by: CHANCE GRIMES  Reason for consult: pulmonary nodules  Assessment/Recommendations: See consult note        Subjective:     HPI:  81 yo man with hx of esophageal ca s/p radiation in 1990s, CKD stage 3, CAD, h/o MI (S/p stent in LAD, on Plavix), HTN, who we are consulted for pulmonary nodules. Was admitted for diarrhea. CT A/P without contrast on 3/13/18 showed colitis, but also "Innumerable pulmonary nodules and lytic lesions in the lumbosacral spine, consistent with metastatic disease." EGD today did not show e/o malignancy. Patient is on plavix and needs to hold it for a week before any biopsy. Scheduled for colonoscopy at [facility] on 3/23. Will get CT chest today        Medications:  Continuous Infusions:   sodium chloride 0.9% 100 mL/hr at 03/15/18 0300     Scheduled Meds:   amLODIPine  5 mg Oral Daily    aspirin  81 mg Oral Daily    ciprofloxacin  400 mg Intravenous Q24H    clopidogrel  75 mg Oral Daily    metoprolol succinate  50 mg Oral Daily    metroNIDAZOLE  500 mg Oral Q8H    polyethylene glycol  17 g Oral Daily    pravastatin  40 mg Oral Daily    tamsulosin  0.4 mg Oral Daily     PRN Meds:acetaminophen, fentaNYL, hydrALAZINE, hydrocodone-acetaminophen 5-325mg, meperidine, ondansetron, ondansetron, oxyCODONE, promethazine (PHENERGAN) IVPB, ramelteon, sodium chloride 0.9%, sodium chloride 0.9%     Review of patient's allergies indicates:  No Known Allergies     Past Medical History:   Diagnosis Date    Age-related osteoporosis without " current pathological fracture 6/6/2017    Benign prostatic hyperplasia 05/10/2017    Cataract     CKD (chronic kidney disease) stage 3, GFR 30-59 ml/min 5/12/2017    Coronary artery disease involving native coronary artery of native heart without angina pectoris 05/10/2017    cards/Dr. Melva Reilly    Esophageal cancer 1997    s/p surgery, XRT    H/O heart artery stent     LAD 9/22/16 Zotarolimus eluting coronary stent system- [employer]    Hypertension     Primary osteoarthritis of right knee 05/10/2017    sees ortho, knee replacement recommended     Past Surgical History:   Procedure Laterality Date    CATARACT EXTRACTION Right     july 2017    CORONARY ANGIOPLASTY WITH STENT PLACEMENT      LCx 1999, mid RCA 2010, LAD 9/2016; cards/Dr. Melva Reilly    THROAT SURGERY      2/2 throat cancer     Family History     Problem Relation (Age of Onset)    Cancer Mother    Hypertension Mother, Daughter, Son, Daughter        Social History Main Topics    Smoking status: Former Smoker     Years: 50.00     Types: Cigarettes    Smokeless tobacco: Never Used    Alcohol use No    Drug use: No    Sexual activity: No       Review of Systems   Constitutional: Negative for appetite change, chills, fatigue and fever.   HENT: Negative for mouth sores and nosebleeds.    Eyes: Negative for visual disturbance.   Respiratory: Negative for cough, shortness of breath and wheezing.    Cardiovascular: Negative for chest pain, palpitations and leg swelling.   Gastrointestinal: Positive for abdominal pain and diarrhea. Negative for abdominal distention, constipation, nausea and vomiting.   Genitourinary: Negative for frequency and hematuria.   Musculoskeletal: Negative for back pain and neck pain.   Skin: Negative for color change, pallor, rash and wound.   Neurological: Negative for tremors, seizures, syncope, speech difficulty, weakness, light-headedness, numbness and headaches.   Hematological: Negative for adenopathy. Does  "not bruise/bleed easily.   Psychiatric/Behavioral: Negative for dysphoric mood and self-injury. The patient is not nervous/anxious.      Objective:     Vital Signs (Most Recent):  Temp: 97.5 °F (36.4 °C) (03/15/18 1612)  Pulse: 71 (03/15/18 1612)  Resp: 18 (03/15/18 1612)  BP: 129/69 (03/15/18 1612)  SpO2: 99 % (03/15/18 1612) Vital Signs (24h Range):  Temp:  [97.5 °F (36.4 °C)-98.4 °F (36.9 °C)] 97.5 °F (36.4 °C)  Pulse:  [62-79] 71  Resp:  [16-20] 18  SpO2:  [96 %-100 %] 99 %  BP: (129-190)/(69-91) 129/69     Weight: 67.4 kg (148 lb 8 oz)  Body mass index is 25.49 kg/m².  Body surface area is 1.74 meters squared.      Intake/Output Summary (Last 24 hours) at 03/15/18 1804  Last data filed at 03/15/18 1433   Gross per 24 hour   Intake             1750 ml   Output              600 ml   Net             1150 ml       Physical Exam    Significant Labs:   CBC:   Recent Labs  Lab 03/14/18  0544 03/15/18  0613   WBC 10.91 10.13   HGB 12.0* 11.5*   HCT 37.9* 37.1*   * 372*    and CMP:   Recent Labs  Lab 03/14/18  0544 03/15/18  0613    139   K 5.1 5.0   * 117*   CO2 18* 16*   GLU 94 92   BUN 18 14   CREATININE 1.8* 1.6*   CALCIUM 8.3* 8.0*   ANIONGAP 6* 6*   EGFRNONAA 35* 40*       Diagnostic Results:  I have reviewed all pertinent imaging results/findings within the past 24 hours.    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 I will follow-up with patient. Please contact us if you have any additional questions.    Heather Manzo MD  Hematology/Oncology  [facility]  "

## 2018-03-15 NOTE — PLAN OF CARE
Problem: Patient Care Overview  Goal: Plan of Care Review  Outcome: Ongoing (interventions implemented as appropriate)  Patient resting in bed at this time, all items within reach, call bell in reach and instructed to call for help, verbalized understanding, night light on, bed in lowest and locked position, non skid footwear on, bed alarm used as needed, patient free from injuries      Room air, tolerating  Diarrhea reported on prev shift, none seen on night shift  NS at 100  Tele reading NSR   No wounds noted      No further complaints or concerns at this time, will cont to monitor

## 2018-03-15 NOTE — SUBJECTIVE & OBJECTIVE
Medications:  Continuous Infusions:   sodium chloride 0.9% 100 mL/hr at 03/15/18 0300     Scheduled Meds:   amLODIPine  5 mg Oral Daily    aspirin  81 mg Oral Daily    ciprofloxacin  400 mg Intravenous Q24H    clopidogrel  75 mg Oral Daily    metoprolol succinate  50 mg Oral Daily    metroNIDAZOLE  500 mg Oral Q8H    polyethylene glycol  17 g Oral Daily    pravastatin  40 mg Oral Daily    tamsulosin  0.4 mg Oral Daily     PRN Meds:acetaminophen, fentaNYL, hydrALAZINE, hydrocodone-acetaminophen 5-325mg, meperidine, ondansetron, ondansetron, oxyCODONE, promethazine (PHENERGAN) IVPB, ramelteon, sodium chloride 0.9%, sodium chloride 0.9%     Review of patient's allergies indicates:  No Known Allergies     Past Medical History:   Diagnosis Date    Age-related osteoporosis without current pathological fracture 6/6/2017    Benign prostatic hyperplasia 05/10/2017    Cataract     CKD (chronic kidney disease) stage 3, GFR 30-59 ml/min 5/12/2017    Coronary artery disease involving native coronary artery of native heart without angina pectoris 05/10/2017    cards/Dr. Melva Reilly    Esophageal cancer 1997    s/p surgery, XRT    H/O heart artery stent     LAD 9/22/16 Zotarolimus eluting coronary stent system- [facility]    Hypertension     Primary osteoarthritis of right knee 05/10/2017    sees ortho, knee replacement recommended     Past Surgical History:   Procedure Laterality Date    CATARACT EXTRACTION Right     july 2017    CORONARY ANGIOPLASTY WITH STENT PLACEMENT      LCx 1999, mid RCA 2010, LAD 9/2016; cards/Dr. Melva Reilly    THROAT SURGERY      2/2 throat cancer     Family History     Problem Relation (Age of Onset)    Cancer Mother    Hypertension Mother, Daughter, Son, Daughter        Social History Main Topics    Smoking status: Former Smoker     Years: 50.00     Types: Cigarettes    Smokeless tobacco: Never Used    Alcohol use No    Drug use: No    Sexual activity: No       Review  of Systems   Constitutional: Negative for appetite change, chills, fatigue and fever.   HENT: Negative for mouth sores and nosebleeds.    Eyes: Negative for visual disturbance.   Respiratory: Negative for cough, shortness of breath and wheezing.    Cardiovascular: Negative for chest pain, palpitations and leg swelling.   Gastrointestinal: Positive for abdominal pain and diarrhea. Negative for abdominal distention, constipation, nausea and vomiting.   Genitourinary: Negative for frequency and hematuria.   Musculoskeletal: Negative for back pain and neck pain.   Skin: Negative for color change, pallor, rash and wound.   Neurological: Negative for tremors, seizures, syncope, speech difficulty, weakness, light-headedness, numbness and headaches.   Hematological: Negative for adenopathy. Does not bruise/bleed easily.   Psychiatric/Behavioral: Negative for dysphoric mood and self-injury. The patient is not nervous/anxious.      Objective:     Vital Signs (Most Recent):  Temp: 97.5 °F (36.4 °C) (03/15/18 1612)  Pulse: 71 (03/15/18 1612)  Resp: 18 (03/15/18 1612)  BP: 129/69 (03/15/18 1612)  SpO2: 99 % (03/15/18 1612) Vital Signs (24h Range):  Temp:  [97.5 °F (36.4 °C)-98.4 °F (36.9 °C)] 97.5 °F (36.4 °C)  Pulse:  [62-79] 71  Resp:  [16-20] 18  SpO2:  [96 %-100 %] 99 %  BP: (129-190)/(69-91) 129/69     Weight: 67.4 kg (148 lb 8 oz)  Body mass index is 25.49 kg/m².  Body surface area is 1.74 meters squared.      Intake/Output Summary (Last 24 hours) at 03/15/18 1804  Last data filed at 03/15/18 1433   Gross per 24 hour   Intake             1750 ml   Output              600 ml   Net             1150 ml       Physical Exam    Significant Labs:   CBC:   Recent Labs  Lab 03/14/18  0544 03/15/18  0613   WBC 10.91 10.13   HGB 12.0* 11.5*   HCT 37.9* 37.1*   * 372*    and CMP:   Recent Labs  Lab 03/14/18  0544 03/15/18  0613    139   K 5.1 5.0   * 117*   CO2 18* 16*   GLU 94 92   BUN 18 14   CREATININE 1.8* 1.6*    CALCIUM 8.3* 8.0*   ANIONGAP 6* 6*   EGFRNONAA 35* 40*       Diagnostic Results:  I have reviewed all pertinent imaging results/findings within the past 24 hours.

## 2018-03-15 NOTE — TRANSFER OF CARE
"Anesthesia Transfer of Care Note    Patient: Neville Cruz    Procedure(s) Performed: Procedure(s) (LRB):  ESOPHAGOGASTRODUODENOSCOPY (EGD) (N/A)    Anesthesia Type: general    Transport from OR: Transported from OR on 2-3 L/min O2 by NC with adequate spontaneous ventilation    Post pain: adequate analgesia    Post assessment: no apparent anesthetic complications    Post vital signs: stable    Level of consciousness: awake    Nausea/Vomiting: no nausea/vomiting    Complications: none    Transfer of care protocol was followed      Last vitals:   Visit Vitals  BP (!) 187/91   Pulse 79   Temp 36.9 °C (98.4 °F) (Oral)   Resp 18   Ht 5' 4" (1.626 m)   Wt 67.4 kg (148 lb 8 oz)   SpO2 96%   BMI 25.49 kg/m²     "

## 2018-03-15 NOTE — HPI
"81 yo man with hx of esophageal ca s/p radiation in 1990s, CKD stage 3, CAD, h/o MI (S/p stent in LAD, on Plavix), HTN, who we are consulted for pulmonary nodules. Was admitted for diarrhea. CT A/P without contrast on 3/13/18 showed colitis, but also "Innumerable pulmonary nodules and lytic lesions in the lumbosacral spine, consistent with metastatic disease." EGD today did not show e/o malignancy. Patient is on plavix and needs to hold it for a week before any biopsy. Scheduled for colonoscopy at [facility] on 3/23. Will get CT chest today  "

## 2018-03-15 NOTE — NURSING
0803 and 0805: Patient hypertensive prior to being transported to procedures for an EGD. Patient asymptomatic, no distress noted. Called EGD and notified them of the elevated blood pressure. They stated to not administer any medications and send patient down. Patient notified and verbalized and understanding. .

## 2018-03-15 NOTE — ANESTHESIA POSTPROCEDURE EVALUATION
"Anesthesia Post Evaluation    Patient: Neville Cruz    Procedure(s) Performed: Procedure(s) (LRB):  ESOPHAGOGASTRODUODENOSCOPY (EGD) (N/A)    Final Anesthesia Type: general  Patient location during evaluation: PACU  Patient participation: Yes- Able to Participate  Level of consciousness: awake and alert  Post-procedure vital signs: reviewed and stable  Pain management: adequate  Airway patency: patent  PONV status at discharge: No PONV  Anesthetic complications: no      Cardiovascular status: blood pressure returned to baseline  Respiratory status: unassisted, spontaneous ventilation and room air  Hydration status: euvolemic  Follow-up not needed.        Visit Vitals  BP (!) 155/78   Pulse 67   Temp 36.7 °C (98 °F) (Oral)   Resp 16   Ht 5' 4" (1.626 m)   Wt 67.4 kg (148 lb 8 oz)   SpO2 96%   BMI 25.49 kg/m²       Pain/Bridgett Score: Pain Assessment Performed: Yes (3/15/2018  6:38 AM)  Presence of Pain: denies (3/15/2018  9:52 AM)  Pain Rating Prior to Med Admin: 8 (3/15/2018  3:38 AM)  Pain Rating Post Med Admin: 0 (3/15/2018  4:22 AM)  Bridgett Score: 10 (3/15/2018  9:52 AM)      "

## 2018-03-15 NOTE — ASSESSMENT & PLAN NOTE
- agree with CT chest  - IR biopsy of lung nodule    - scheduled for colonoscopy on 3/23  - will see patient in the office one week after biopsy to discuss biopsy and imaging results

## 2018-03-15 NOTE — SUBJECTIVE & OBJECTIVE
Interval History:  Still has abdominal gas, but no further loose stools.  Feeling better overall and tolerating diet.    Review of Systems   Constitutional: Negative for chills and fever.   Respiratory: Negative for cough and shortness of breath.    Cardiovascular: Negative for chest pain and palpitations.     Objective:     Vital Signs (Most Recent):  Temp: 97.5 °F (36.4 °C) (03/15/18 1612)  Pulse: 71 (03/15/18 1612)  Resp: 18 (03/15/18 1612)  BP: 129/69 (03/15/18 1612)  SpO2: 99 % (03/15/18 1612) Vital Signs (24h Range):  Temp:  [97.5 °F (36.4 °C)-98.4 °F (36.9 °C)] 97.5 °F (36.4 °C)  Pulse:  [62-79] 71  Resp:  [16-20] 18  SpO2:  [96 %-100 %] 99 %  BP: (129-190)/(69-91) 129/69     Weight: 67.4 kg (148 lb 8 oz)  Body mass index is 25.49 kg/m².    Intake/Output Summary (Last 24 hours) at 03/15/18 1857  Last data filed at 03/15/18 1433   Gross per 24 hour   Intake             1750 ml   Output              600 ml   Net             1150 ml      Physical Exam   Constitutional: He is oriented to person, place, and time. He appears well-developed.   Thin, frail, cachectic.  Voice hoarse.   HENT:   Head: Normocephalic.   Eyes: Conjunctivae are normal. Pupils are equal, round, and reactive to light.   Neck: Neck supple. No thyromegaly present.   Cardiovascular: Normal rate, regular rhythm, normal heart sounds and intact distal pulses.  Exam reveals no gallop and no friction rub.    No murmur heard.  Pulmonary/Chest: Effort normal and breath sounds normal.   Abdominal: Soft. Bowel sounds are normal. He exhibits no distension. There is no tenderness.   Musculoskeletal: Normal range of motion. He exhibits no edema.   Lymphadenopathy:     He has no cervical adenopathy.   Neurological: He is alert and oriented to person, place, and time.   Strength equal and symmetric   Skin: Skin is warm and dry. No rash noted.   Psychiatric: He has a normal mood and affect. His behavior is normal. Thought content normal.       Significant  Labs: All pertinent labs within the past 24 hours have been reviewed.    Significant Imaging: I have reviewed all pertinent imaging results/findings within the past 24 hours.

## 2018-03-15 NOTE — OR NURSING
Pt wihtout change from previous assessment. Denies pain. Prepared for transfer to inpt room 341. Report by phone to Sabrina MEEKS. Pt placed on transport.

## 2018-03-16 NOTE — PROGRESS NOTES
[facility] Hospital Medicine  Progress Note    Patient Name: Neville Cruz  MRN: [id number]  Patient Class: IP- Inpatient   Admission Date: 3/12/2018  Length of Stay: 2 days  Attending Physician: Ebonie Grimes MD  Primary Care Provider: Liberty Berger MD        Subjective:     Principal Problem:Colitis, acute    HPI:  Mr. Cruz is an 82 year old man who presented to the ED 3/12/18 with abdominal cramping and non-bloody diarrhea for about 7 hours.  In addition he has had anorexia and a 15 pound weight loss over the last month.  He has not had fever, vomiting, constipation or blood in the stool.  Significant labs included WBC 21.8K, potassium 6.5, BUN/creatinine 24/2 (at baseline).  CT was done and showed colitis from the splenic flexure to the rectum.  Additional findings included innumerable pulmonary nodules and lytic lesions in the lumbosacral spine, consistent with metastatic disease.  He was treated for hyperkalemia with insulin & glucose, bicarbonate and calcium gluconate, and was admitted for treatment of colitis.    Patient's history includes esophageal cancer diagnosed in 1997 and treated with radiation.  He has CKD IV and CAD.  He has undergone coronary artery stenting in 1999, 2010 and 2016.  He is followed by Dr. Reilly and is on Plavix.        Hospital Course:  Patient was started on IV antibiotics for colitis and GI was consulted to follow.  Patient had a similar episode to this late last year and a colonoscopy has already been scheduled for later this month (3/23).  EGD was done 3/15 given patient's apparent metastases and history of esophageal cancer.  This was negative for evidence of recurrent esophageal cancer.  CT of the chest was done to evaluate the best location for biopsy.    Interval History:  Still has abdominal gas, but no further loose stools.  Feeling better overall and tolerating diet.    Review of Systems   Constitutional: Negative for chills and fever.    Respiratory: Negative for cough and shortness of breath.    Cardiovascular: Negative for chest pain and palpitations.     Objective:     Vital Signs (Most Recent):  Temp: 97.5 °F (36.4 °C) (03/15/18 1612)  Pulse: 71 (03/15/18 1612)  Resp: 18 (03/15/18 1612)  BP: 129/69 (03/15/18 1612)  SpO2: 99 % (03/15/18 1612) Vital Signs (24h Range):  Temp:  [97.5 °F (36.4 °C)-98.4 °F (36.9 °C)] 97.5 °F (36.4 °C)  Pulse:  [62-79] 71  Resp:  [16-20] 18  SpO2:  [96 %-100 %] 99 %  BP: (129-190)/(69-91) 129/69     Weight: 67.4 kg (148 lb 8 oz)  Body mass index is 25.49 kg/m².    Intake/Output Summary (Last 24 hours) at 03/15/18 1857  Last data filed at 03/15/18 1433   Gross per 24 hour   Intake             1750 ml   Output              600 ml   Net             1150 ml      Physical Exam   Constitutional: He is oriented to person, place, and time. He appears well-developed.   Thin, frail, cachectic.  Voice hoarse.   HENT:   Head: Normocephalic.   Eyes: Conjunctivae are normal. Pupils are equal, round, and reactive to light.   Neck: Neck supple. No thyromegaly present.   Cardiovascular: Normal rate, regular rhythm, normal heart sounds and intact distal pulses.  Exam reveals no gallop and no friction rub.    No murmur heard.  Pulmonary/Chest: Effort normal and breath sounds normal.   Abdominal: Soft. Bowel sounds are normal. He exhibits no distension. There is no tenderness.   Musculoskeletal: Normal range of motion. He exhibits no edema.   Lymphadenopathy:     He has no cervical adenopathy.   Neurological: He is alert and oriented to person, place, and time.   Strength equal and symmetric   Skin: Skin is warm and dry. No rash noted.   Psychiatric: He has a normal mood and affect. His behavior is normal. Thought content normal.       Significant Labs: All pertinent labs within the past 24 hours have been reviewed.    Significant Imaging: I have reviewed all pertinent imaging results/findings within the past 24  hours.    Assessment/Plan:      * Colitis, acute    - Inflammation noted from sigmoid colon to rectum on CT   - Multiple episodes of diarrhea, resolving on antibiotics  - EGD negative for recurrence of cancer  - Colonoscopy planned for later this month after patient is off Plavix for 5 days.          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  - Seen by hematology today, arranging for biopsy after colonoscopy and follow up in office.          CKD (chronic kidney disease) stage 3, GFR 30-59 ml/min    - baseline Cr appears to be 1.8, improving in hospital with IVF.  - avoid nephrotoxins  - renally dose meds   - monitor         Benign essential HTN    - BP elevated upon admission  - continue BP meds except ACEi/ARB  - hydralazine PRN  - monitor        Coronary artery disease involving native coronary artery of native heart without angina pectoris    - continue ASA, statin, Plavix        History of throat cancer    - Esophageal cancer diagnosed and treated with XRT in 1997.  - Has not had recent follow up, saw an ENT in [place] a couple of years ago  - No recurrence seen on EGD.          VTE Risk Mitigation         Ordered     Medium Risk of VTE  Once      03/13/18 0803     Reason for No Pharmacological VTE Prophylaxis  Once      03/13/18 0803     Place sequential compression device  Until discontinued      03/13/18 0237              Ebonie Contreras MD  Department of Hospital Medicine   [facility]

## 2018-03-16 NOTE — ASSESSMENT & PLAN NOTE
- Improved with treatment and holding Ace inhibitor - will need to discontinue this permanently  - no EKG changes  - monitor BMP

## 2018-03-16 NOTE — ASSESSMENT & PLAN NOTE
- baseline Cr appears to be 1.8, improving in hospital with IVF.  - avoid nephrotoxins  - renally dose meds   - monitor

## 2018-03-16 NOTE — ASSESSMENT & PLAN NOTE
- Esophageal cancer diagnosed and treated with XRT in 1997.  - Has not had recent follow up, saw an ENT in [place] a couple of years ago  - No recurrence seen on EGD.

## 2018-03-16 NOTE — PLAN OF CARE
Problem: Fall Risk (Adult)  Goal: Absence of Falls  Patient will demonstrate the desired outcomes by discharge/transition of care.   Outcome: Ongoing (interventions implemented as appropriate)  Fall risk noted.  Yellow bracelet and yellow socks in place.  No reports of falls/injury.    Problem: Patient Care Overview  Goal: Plan of Care Review  Outcome: Ongoing (interventions implemented as appropriate)  Plan of care reviewed with the patient.  No new questions.  Patient is tired and doesn't wish to have a discussion at this time.

## 2018-03-16 NOTE — ASSESSMENT & PLAN NOTE
- Hold off on workup with biopsy until patient off Plavix.  - Seen by hematology today, arranging for biopsy after colonoscopy and follow up in office.

## 2018-03-16 NOTE — NURSING
New orders for discharge noted. Pt is agreeable with discharge. Discharge teaching done at bedside, reviewed medications, and gave appointments. Pt verbalized understanding. Removed PIV, catheter tip intact. Dressing applied. Daughter called to make aware of d/c. Will discharge home once ride gets here.

## 2018-03-16 NOTE — PLAN OF CARE
Problem: Patient Care Overview  Goal: Plan of Care Review  Outcome: Ongoing (interventions implemented as appropriate)  Pt on RA sat's 98% with no distress noted.

## 2018-03-16 NOTE — ASSESSMENT & PLAN NOTE
- Inflammation noted from sigmoid colon to rectum on CT   - Multiple episodes of diarrhea, resolving on antibiotics  - EGD negative for recurrence of cancer  - Colonoscopy planned for later this month after patient is off Plavix for 5 days.

## 2018-03-16 NOTE — PROGRESS NOTES
This gent is feeling decently and he denies pain nausea or vomiting. He tolerated endoscopy well and pathology is pending. He is set for a colonoscopy at [facility] later this month.    T<100 HR 82  Anicteric no temporal wasting  Neck supple no mass  Heart no gallop, RR  Chest clear no wheeze  Abdomen soft active sounds no masses or ascites or tenderness  Extremities no cyanosis or edema  Psyche no depression, pleasant    Labs as reported, pathology pending    Agree with discharge plans. He needs a colon exam and I understand this is set up at [facility] for later this month. Same medications.

## 2018-03-16 NOTE — DISCHARGE SUMMARY
[facility] Hospital Medicine  Discharge Summary      Patient Name: Neville Cruz  MRN: [id number]  Admission Date: 3/12/2018  Hospital Length of Stay: 3 days  Discharge Date and Time: 3/16/2018  2:04 PM  Attending Physician: No att. providers found   Discharging Provider: Ebonie Contreras MD  Primary Care Provider: Liberty Berger MD      HPI:   Mr. Cruz is an 82 year old man who presented to the ED 3/12/18 with abdominal cramping and non-bloody diarrhea for about 7 hours.  In addition he has had anorexia and a 15 pound weight loss over the last month.  He has not had fever, vomiting, constipation or blood in the stool.  Significant labs included WBC 21.8K, potassium 6.5, BUN/creatinine 24/2 (at baseline).  CT was done and showed colitis from the splenic flexure to the rectum.  Additional findings included innumerable pulmonary nodules and lytic lesions in the lumbosacral spine, consistent with metastatic disease.  He was treated for hyperkalemia with insulin & glucose, bicarbonate and calcium gluconate, and was admitted for treatment of colitis.    Patient's history includes esophageal cancer diagnosed in 1997 and treated with radiation.  He has CKD IV and CAD.  He has undergone coronary artery stenting in 1999, 2010 and 2016.  He is followed by Dr. Reilly and is on Plavix.        Procedure(s) (LRB):  ESOPHAGOGASTRODUODENOSCOPY (EGD) (N/A)      Hospital Course:   Patient was started on IV antibiotics for colitis and GI was consulted to follow.  Patient had a similar episode to this late last year and a colonoscopy has already been scheduled for later this month (3/23).  EGD was done 3/15 given patient's apparent metastases and history of esophageal cancer.  This was negative for evidence of recurrent esophageal cancer.      Patient's benazepril was held for the hyperkalemia.  After 3 days the potassium level had decreased only to 4.9.  At this time he should not resume the benazepril  given ongoing hyperkalemia.     CT of the chest was done to evaluate the best location for biopsy.  This had findings of a large pulmonary mass in the left upper lobe measuring nearly 9 cm.  There was also peribronchial and paraseptal thickening in the left upper lobe suspicious for lymphangitic carcinomatosis, and multiple scattered lucent and sclerotic lesions involving the spine and ribs with probable pathologic fracture of the left lateral 6th rib, all suspicious for metastatic disease.  I discussed these findings with the patient and with his daughter.  Patient was seen by Dr. Manzo with Oncology, and a plan was made for outpatient biopsy of the mass at the same time he is off his Plavix, in order to minimize the time he is off this.  He will follow up with her after the biopsy for the results and to plan for future treatment.    At discharge the patient's symptoms had resolved and he was tolerating a diet.  Stool studies were not done as by the time an attempt was made to collect stool he had stopped having diarrhea.  He will continue oral antibiotics for another 5 days.  Follow up as scheduled for colonoscopy on 3/23.  IR will be contacting him to arrange for the biopsy, likely on Monday 3/26.     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 [C79.9] 03/13/2018 Yes    Pulmonary nodules [R91.8] 03/13/2018 Yes    Malnutrition of moderate degree [E44.0] 03/13/2018 Yes    CKD (chronic kidney disease) stage 3, GFR 30-59 ml/min [N18.3] 05/12/2017 Yes     Chronic    History of throat cancer [Z85.819] 05/10/2017 Yes    Coronary artery disease involving native coronary  artery of native heart without angina pectoris [I25.10] 05/10/2017 Yes     Chronic    Benign essential HTN [I10] 05/10/2017 Yes     Chronic      Problems Resolved During this Admission:    Diagnosis Date Noted Date Resolved POA       Discharged Condition: stable    Disposition: Home or Self Care    Follow Up:  Follow-up Information     Liberty Berger MD.    Specialty:  Internal Medicine  Why:  Follow up in 1-2 weeks  Contact information:  [address]  [facility] [address]  [phone]                 Patient Instructions:     IR Biopsy Lung   Standing Status: Future  Standing Exp. Date: 03/16/19   Scheduling Instructions: Patient on Plavix, to be held on 3/18/2018 in preparation for colonoscopy scheduled 3/23/2018.  Would like to schedule lung biopsy for Monday 3/26/2018 while he is still off Plavix.   Order Specific Question Answer Comments   Reason for Exam: Lung mass with associated nodules, bone lesions    Has the patient had a prior contrast or iodine reaction? No    Is the patient currently on any blood thinners like Aspirin, Coumadin, or Plavix? Yes    Is the patient on any Metaformin drug, such as Glugophage or Glucovance? No    May the Radiologist modify the order per protocol to meet the clinical needs of the patient? Yes      Diet Cardiac     Activity as tolerated         Significant Diagnostic Studies:  Thoracic CT without contrast  CLINICAL INDICATION:  Lung nodule, >=1cm Multiple pulmonary nodules consistent with metastatic disease on CT .    COMPARISON: Prior radiograph from 3/13/18 and prior CT of the abdomen dated 3/13/18    TECHNIQUE: 2.5 mm axial images of the chest obtained without intravenous contrast, coronal and sagittal reconstructions generated.      FINDINGS:  The structures at the base of the neck are unremarkable.    There is a left-sided aortic arch with three branch vessels.  The aorta is normal in caliber, contour, and course.  The mediastinal structures are midline.  The  heart is not enlarged.  There is no mediastinal or hilar lymph node enlargement.  No pericardial fluid is seen.    The airways are patent.  Innumerable pulmonary nodules are present with a diffuse pattern highly suspicious for metastatic disease.  The largest of these measures approximately 2.8 cm and lies at the posterior aspect of the left lower lobe involving the superior and posterior basal segments.  There is also a confluent opacity suspicious for pulmonary mass in the left upper lobe probably abutting the left mediastinal margin measuring approximately 8.8 cm.  This involves the anterior and apical posterior segments.  There is also peribronchial and paraseptal thickening in the left upper lobe suspicious for lymphangitic carcinomatosis.  Trace left pleural fluid is present.  This area appears to abut the superior margin of the left pulmonary artery without definite vascular invasion.  This is suspicious for primary lung malignancy, although pneumonia could conceivably have this appearance.    Multiple scattered lucent and sclerotic lesions involving the spine and ribs with probable pathologic fracture of the left lateral 6th rib, all suspicious for metastatic disease.     Impression:   1.  Innumerable diffuse pulmonary nodules as previously described, most consistent with metastatic disease.    2.  8.8 cm opacity in the left upper lobe which is highly suspicious for primary lung malignancy although pneumonia could conceivably have a similar appearance.  This abuts the left mediastinal margin and left pulmonary artery without evidence of vascular invasion.  There is also peribronchial and septal thickening in the left upper lobe suspicious for lymphangitic carcinomatosis and trace pleural fluid is seen.    3.  Multiple lucent and sclerotic bone lesions consistent with metastatic disease, with probable pathologic fracture of the left lateral 6th rib.      Electronically signed by: JASON OWEN MD  Date:  03/16/18       Medications:  Reconciled Home Medications:   Discharge Medication List as of 3/16/2018  1:21 PM      START taking these medications    Details   ciprofloxacin HCl (CIPRO) 500 MG tablet Take 1 tablet (500 mg total) by mouth 2 (two) times daily., Starting Fri 3/16/2018, Until Wed 3/21/2018, Normal      metroNIDAZOLE (FLAGYL) 500 MG tablet Take 1 tablet (500 mg total) by mouth every 8 (eight) hours., Starting Fri 3/16/2018, Until Wed 3/21/2018, Normal         CONTINUE these medications which have NOT CHANGED    Details   acetaminophen-codeine 300-30mg (TYLENOL #3) 300-30 mg Tab Take 1 tablet by mouth every 12 (twelve) hours as needed (severe pain)., Starting Mon 2/19/2018, Normal      amLODIPine (NORVASC) 5 MG tablet Take 1 tablet (5 mg total) by mouth daily as needed. Take if systolic blood pressure over 130., Starting Thu 2/8/2018, Until Fri 2/8/2019, No Print      aspirin 81 MG Chew Take 81 mg by mouth once daily., Until Discontinued, Historical Med      cholecalciferol, vitamin D3, 1,000 unit capsule Take 1 capsule (1,000 Units total) by mouth once daily., Starting Mon 2/19/2018, Until Tue 2/19/2019, Normal      clopidogrel (PLAVIX) 75 mg tablet Take 1 tablet (75 mg total) by mouth once daily., Starting 5/10/2017, Until Thu 5/10/18, Normal      metoprolol succinate (TOPROL-XL) 50 MG 24 hr tablet Take 1 tablet (50 mg total) by mouth once daily., Starting 5/10/2017, Until Thu 5/10/18, Normal      omeprazole (PRILOSEC) 20 MG capsule TAKE 1 CAPSULE BY MOUTH ONCE DAILY AT 6 AM., Normal      polyethylene glycol (GLYCOLAX) 17 gram/dose powder Take 17 g by mouth once daily., Starting Thu 12/21/2017, Normal      pravastatin (PRAVACHOL) 40 MG tablet Take 1 tablet (40 mg total) by mouth once daily., Starting 5/10/2017, Until Thu 5/10/18, Normal      tamsulosin (FLOMAX) 0.4 mg Cp24 Take 1 capsule (0.4 mg total) by mouth once daily., Starting 5/10/2017, Until Thu 5/10/18, Normal      zolpidem (AMBIEN) 5 MG Tab  TAKE 1 TABLET(5 MG) BY MOUTH EVERY NIGHT AS NEEDED FOR INSOMNIA, Normal         STOP taking these medications       benazepril (LOTENSIN) 20 MG tablet Comments:   Reason for Stopping:  Hyperkalemia              Time spent on the discharge of patient: >30 minutes  Patient was seen and examined on the date of discharge and determined to be suitable for discharge.         Ebonie Contreras MD  Department of Hospital Medicine  [facility]

## 2018-03-19 NOTE — TELEPHONE ENCOUNTER
----- Message from Jennifer Pearson sent at 3/19/2018 12:33 PM CDT -----  Contact: Daughter/Clemencia [phone]  Requesting a call concerning her father. Patient now has cancer in his lungs. Requesting a call after 5;30pm.    Please call and advise.    Thank You

## 2018-03-19 NOTE — TELEPHONE ENCOUNTER
Attempted to return daughter's call.  Left message stating I would try calling back and asked Clemencia to call me or send a message with other times that she's available to talk with me.

## 2018-03-19 NOTE — TELEPHONE ENCOUNTER
Called and spoke with Clemencia.  Pt has some mild low back pain currently controlled w Tylenol 3.  Pt taking Miralax, has not had a BM since hospital DC.  Recommend continuing with Tyelnol 3 for now as stronger pain medications can be more constipating.  Clemencia verbalized understanding and will let me know if pain gets worse.

## 2018-03-19 NOTE — TELEPHONE ENCOUNTER
----- Message from Yasemin Burkett sent at 3/19/2018  2:21 PM CDT -----  Contact: Clemencia/daughter/[phone]  Patient is returning a phone call.  Who left a message for the patient:  Dr. Liberty Berger  Does patient know what this is regarding: pt daughter would like to talk to the doctor regarding the pt.  Comments:Pt daughter stated  please call before 8:30am, she has to be to work for 8:30am.

## 2018-03-21 NOTE — TELEPHONE ENCOUNTER
Routing comment from pt's cardiologist:    Discontinue clopidogrel 5 days prior to biopsy and resume at 75 mg qd as soon as possible after the procedure.     Georgi Pickens (Routing comment)    If anything further is needed Please contact Cardiologist

## 2018-03-21 NOTE — TELEPHONE ENCOUNTER
Patient's daughter Clemencia is concerned about not knowing if patient is having a biopsy on Monday. Advised patient that in my experience they do not tell you what time procedures are till the day before. I advised Clemencia to bring him to the colonoscopy as planned Friday and they may have more information then. Clemencia does understand and will wait till Friday to see if she gets any answers in regard to Monday's supposed biopsy.

## 2018-03-21 NOTE — TELEPHONE ENCOUNTER
----- Message from Jennifer Quyenlynne sent at 3/21/2018  8:47 AM CDT -----  Contact: Daughter/Clemencia [phone]  Daughter is requesting a call concerning her father's biopsy. He was discharged from the hospital on last Friday 03/16/2018. States that it suppose to be on Monday 03/26/2018. Daughter has been calling the hospital, but hasn't been able to get an answer about the biopsy.    Please call and advise.    Thank You

## 2018-03-21 NOTE — TELEPHONE ENCOUNTER
----- Message from Lakshmi Laughiln sent at 3/21/2018 11:49 AM CDT -----  Contact: Lakshmi with Imaging  X  1st Request  _  2nd Request  _  3rd Request        Who: Lakshmi with Imaging    Why: Requesting a call back in regards to a letter stating the pt can be off of the Plavix for 5 days. Pt has orders to get a IR Lung Biopsy and needs approval before scheduling.    Please return the call at earliest convenience. Thanks!    What Number to Call Back: Ext [phone]      When to Expect a call back: (Within 24 hours)

## 2018-03-21 NOTE — TELEPHONE ENCOUNTER
Lakshmi calling to get clearnace from  for patient to be off Plavix. I spoke to patient's daughter, Clemencia this morning who states that patient has been off Plavix since 03/18/18 as he is having a Colonoscopy. informs me that Clemencia asked her if it was OK for patient to be off Plavix and  told patient to ask his Cardiologist. Now Lakshmi from Imaging calling for OK to be off plavix for lung biopsy.  asked me to route to patient's Cardiologist.

## 2018-03-23 PROBLEM — D50.9 IRON DEFICIENCY ANEMIA: Status: ACTIVE | Noted: 2018-01-01

## 2018-03-23 NOTE — ANESTHESIA PREPROCEDURE EVALUATION
03/23/2018  Neville Cruz is a 82 y.o., male.    Anesthesia Evaluation    I have reviewed the Patient Summary Reports.     I have reviewed the Medications.     Review of Systems  Anesthesia Hx:  No problems with previous Anesthesia  History of prior surgery of interest to airway management or planning: Previous anesthesia: General   Social:  Former Smoker, No Alcohol Use    Hematology/Oncology:  Hematology Normal   Oncology Normal     EENT/Dental:EENT/Dental Normal   Cardiovascular:   Exercise tolerance: poor Hypertension, well controlled CAD asymptomatic     Pulmonary:  Pulmonary Normal    Renal/:   Chronic Renal Disease, CRI    Hepatic/GI:  Hepatic/GI Normal    Musculoskeletal:   Arthritis     Neurological:  Neurology Normal    Endocrine:  Endocrine Normal    Dermatological:  Skin Normal    Psych:  Psychiatric Normal           Physical Exam  General:  Well nourished    Airway/Jaw/Neck:  Airway Findings: Mouth Opening: Normal Tongue: Normal  General Airway Assessment: Adult  Mallampati: II  TM Distance: Normal, at least 6 cm  Jaw/Neck Findings:  Neck ROM: Normal ROM      Dental:  Dental Findings: In tact, Upper Dentures         Mental Status:  Mental Status Findings:  Cooperative, Alert and Oriented         Anesthesia Plan  Type of Anesthesia, risks & benefits discussed:  Anesthesia Type:  general  Patient's Preference: GA  Intra-op Monitoring Plan: standard ASA monitors  Intra-op Monitoring Plan Comments:   Post Op Pain Control Plan:   Post Op Pain Control Plan Comments:   Induction:   IV  Beta Blocker:  Patient is on a Beta-Blocker and has received one dose within the past 24 hours (No further documentation required).       Informed Consent: Patient understands risks and agrees with Anesthesia plan.  Questions answered. Anesthesia consent signed with patient.  ASA Score: 3     Day of Surgery Review of  History & Physical:  There are no significant changes.  H&P update referred to the provider.     Anesthesia Plan Notes: Pt with raspy voice s/p laryngeal CA.        Ready For Surgery From Anesthesia Perspective.

## 2018-03-23 NOTE — H&P
Short Stay Endoscopy History and Physical    PCP - Liberty Berger MD  Referring Physician - Lianna Peters, NP  [address] [place] [address]    Procedure - Colonoscopy  ASA - per anesthesia  Mallampati - per anesthesia  History of Anesthesia problems - no  Family history Anesthesia problems -  no   Plan of anesthesia - General    HPI  82 y.o. male  Reason for procedure: iron def anemia, history of colitis      ROS:  Constitutional: No fevers, chills, No weight loss  CV: No chest pain  Pulm: No cough, No shortness of breath  GI: see HPI    Medical History:  has a past medical history of Age-related osteoporosis without current pathological fracture (6/6/2017); Benign prostatic hyperplasia (05/10/2017); Cataract; CKD (chronic kidney disease) stage 3, GFR 30-59 ml/min (5/12/2017); Coronary artery disease involving native coronary artery of native heart without angina pectoris (05/10/2017); Esophageal cancer (1997); H/O heart artery stent; Hypertension; and Primary osteoarthritis of right knee (05/10/2017).    Surgical History:  has a past surgical history that includes Throat surgery; Coronary angioplasty with stent; and Cataract extraction (Right).    Family History: family history includes Cancer in his mother; Hypertension in his daughter, daughter, mother, and son.. Otherwise no colon cancer, inflammatory bowel disease, or GI malignancies.    Social History:  reports that he has quit smoking. His smoking use included Cigarettes. He quit after 50.00 years of use. He has never used smokeless tobacco. He reports that he does not drink alcohol or use drugs.    Review of patient's allergies indicates:  No Known Allergies    Medications:   Prescriptions Prior to Admission   Medication Sig Dispense Refill Last Dose    acetaminophen-codeine 300-30mg (TYLENOL #3) 300-30 mg Tab Take 1 tablet by mouth every 12 (twelve) hours as needed (severe pain). 60 tablet 0 Past Week at Unknown time    amLODIPine  (NORVASC) 5 MG tablet Take 1 tablet (5 mg total) by mouth daily as needed. Take if systolic blood pressure over 130. 90 tablet 3 3/23/2018 at 0700    aspirin 81 MG Chew Take 81 mg by mouth once daily.   3/22/2018 at Unknown time    cholecalciferol, vitamin D3, 1,000 unit capsule Take 1 capsule (1,000 Units total) by mouth once daily. 90 capsule 3 3/23/2018 at Unknown time    clopidogrel (PLAVIX) 75 mg tablet Take 1 tablet (75 mg total) by mouth once daily. 90 tablet 3 3/17/2018 at Unknown time    metoprolol succinate (TOPROL-XL) 50 MG 24 hr tablet Take 1 tablet (50 mg total) by mouth once daily. 90 tablet 3 3/23/2018 at Unknown time    omeprazole (PRILOSEC) 20 MG capsule TAKE 1 CAPSULE BY MOUTH ONCE DAILY AT 6 AM. 90 capsule 2 3/23/2018 at Unknown time    pravastatin (PRAVACHOL) 40 MG tablet Take 1 tablet (40 mg total) by mouth once daily. 90 tablet 3 Past Week at Unknown time    tamsulosin (FLOMAX) 0.4 mg Cp24 Take 1 capsule (0.4 mg total) by mouth once daily. 90 capsule 3 3/23/2018 at Unknown time    zolpidem (AMBIEN) 5 MG Tab TAKE 1 TABLET(5 MG) BY MOUTH EVERY NIGHT AS NEEDED FOR INSOMNIA 90 tablet 1 Past Week at Unknown time    polyethylene glycol (GLYCOLAX) 17 gram/dose powder Take 17 g by mouth once daily. 1 Bottle 6 Taking       Physical Exam:    Vital Signs: There were no vitals filed for this visit.    General Appearance: Well appearing in no acute distress  Lungs: CTA anteriorly  Heart:  Regular rate, S1, S2 normal, no murmurs heard.  Abdomen: Soft, non tender, non distended with normal bowel sounds, no masses  Extremities: No edema    Labs:  Lab Results   Component Value Date    WBC 8.79 03/16/2018    HGB 11.0 (L) 03/16/2018    HCT 35.4 (L) 03/16/2018     03/16/2018    CHOL 132 05/10/2017    TRIG 128 05/10/2017    HDL 37 (L) 05/10/2017    ALT 14 03/12/2018    AST 17 03/12/2018     03/16/2018    K 4.9 03/16/2018     (H) 03/16/2018    CREATININE 1.5 (H) 03/16/2018    BUN 16  03/16/2018    CO2 18 (L) 03/16/2018    TSH 1.543 10/03/2017    HGBA1C 5.4 03/13/2018       I have explained the risks and benefits of this endoscopic procedure to the patient including but not limited to bleeding, inflammation, infection, perforation, and death.      Herrera Sanchez MD

## 2018-03-23 NOTE — TRANSFER OF CARE
Anesthesia Transfer of Care Note    Patient: Neville Cruz    Procedure(s) Performed: Procedure(s) (LRB):  COLONOSCOPY (N/A)    Patient location: [facility]    Anesthesia Type: general    Transport from OR: Transported from OR on room air with adequate spontaneous ventilation    Post pain: adequate analgesia    Post assessment: no apparent anesthetic complications and tolerated procedure well    Post vital signs: stable    Level of consciousness: awake and responds to stimulation    Nausea/Vomiting: no nausea/vomiting    Complications: none    Transfer of care protocol was followed      Last vitals: There were no vitals taken for this visit.

## 2018-03-23 NOTE — PLAN OF CARE
Patient instructed to resume Plavix today 3/23/18 per Dr. Sanchez but daughter states she spoke to his primary doctor who instructed her to continue to hold for up coming testing.

## 2018-03-23 NOTE — ANESTHESIA POSTPROCEDURE EVALUATION
Anesthesia Post Evaluation    Patient: Neville Cruz    Procedure(s) Performed: Procedure(s) (LRB):  COLONOSCOPY (N/A)    Final Anesthesia Type: general  Patient location during evaluation: PACU  Patient participation: Yes- Able to Participate  Level of consciousness: awake and alert  Post-procedure vital signs: reviewed and stable  Pain management: adequate  Airway patency: patent  PONV status at discharge: No PONV  Anesthetic complications: no      Cardiovascular status: blood pressure returned to baseline  Respiratory status: unassisted  Hydration status: euvolemic  Follow-up not needed.        Visit Vitals  /64   Pulse 73   Temp 37 °C (98.6 °F)   Resp 16   SpO2 99%       Pain/Bridgett Score: Pain Assessment Performed: Yes (3/23/2018 11:02 AM)  Presence of Pain: denies (3/23/2018 11:02 AM)  Bridgett Score: 9 (3/23/2018 11:00 AM)

## 2018-03-23 NOTE — DISCHARGE INSTRUCTIONS
Colonoscopy     A camera attached to a flexible tube with a viewing lens is used to take video pictures.     Colonoscopy is a test to view the inside of your lower digestive tract (colon and rectum). Sometimes it can show the last part of the small intestine (ileum). During the test, small pieces of tissue may be removed for testing. This is called a biopsy. Small growths, such as polyps, may also be removed.   Why is colonoscopy done?  The test is done to help look for colon cancer. And it can help find the source of abdominal pain, bleeding, and changes in bowel habits. It may be needed once a year, depending on factors such as your:  · Age  · Health history  · Family health history  · Symptoms  · Results from any prior colonoscopy  Risks and possible complications  These include:  · Bleeding               · A puncture or tear in the colon   · Risks of anesthesia  · A cancer lesion not being seen  Getting ready   To prepare for the test:  · Talk with your healthcare provider about the risks of the test (see below). Also ask your healthcare provider about alternatives to the test.  · Tell your healthcare provider about any medicines you take. Also tell him or her about any health conditions you may have.  · Make sure your rectum and colon are empty for the test. Follow the diet and bowel prep instructions exactly. If you dont, the test may need to be rescheduled.  · Plan for a friend or family member to drive you home after the test.     Colonoscopy provides an inside view of the entire colon.     You may discuss the results with your doctor right away or at a future visit.  During the test   The test is usually done in the hospital on an outpatient basis. This means you go home the same day. The procedure takes about 30 minutes. During that time:  · You are given relaxing (sedating) medicine through an IV line. You may be drowsy, or fully asleep.  · The healthcare provider will first give you a physical exam to  check for anal and rectal problems.  · Then the anus is lubricated and the scope inserted.  · If you are awake, you may have a feeling similar to needing to have a bowel movement. You may also feel pressure as air is pumped into the colon. Its OK to pass gas during the procedure.  · Biopsy, polyp removal, or other treatments may be done during the test.  After the test   You may have gas right after the test. It can help to try to pass it to help prevent later bloating. Your healthcare provider may discuss the results with you right away. Or you may need to schedule a follow-up visit to talk about the results. After the test, you can go back to your normal eating and other activities. You may be tired from the sedation and need to rest for a few hours.  Date Last Reviewed: 11/1/2016 © 2000-2017 [employer]. [address] [place] [address], [place] [address]. All rights reserved. This information is not intended as a substitute for professional medical care. Always follow your healthcare professional's instructions.

## 2018-03-23 NOTE — TELEPHONE ENCOUNTER
----- Message from Emily Moreno sent at 3/23/2018  9:14 AM CDT -----  Contact: alin martin at [phone]  Wants to make sure it is ok to stay off of plavix for a week,he has a biopsy on Tuesday and had to stop taking and would like to know when he can restart and if they should be aware of any effects of not taking

## 2018-03-23 NOTE — PROVATION PATIENT INSTRUCTIONS
Discharge Summary/Instructions after an Endoscopic Procedure  Patient Name: Neville Cruz  Patient MRN: [id number]  Patient YOB: 1935 Friday, March 23, 2018  Hererra Sanchez MD  RESTRICTIONS:  During your procedure today, you received medications for sedation.  These   medications may affect your judgment, balance and coordination.  Therefore,   for 24 hours, you have the following restrictions:   - DO NOT drive a car, operate machinery, make legal/financial decisions,   sign important papers or drink alcohol.    ACTIVITY:  The following day: return to full activity including work, except no heavy   lifting, straining or running for 3 days if polyps were removed.  DIET:  Eat and drink normally unless instructed otherwise.     TREATMENT FOR COMMON SIDE EFFECTS:  - Mild abdominal pain, nausea, belching, bloating or excessive gas:  rest,   eat lightly and use a heating pad.  - Sore Throat: treat with throat lozenges and/or gargle with warm salt   water.  - Because air was used during the procedure, expelling large amounts of air   from your rectum or belching is normal.  - If a bowel prep was taken, you may not have a bowel movement for 1-3 days.    This is normal.  SYMPTOMS TO WATCH FOR AND REPORT TO YOUR PHYSICIAN:  1. Abdominal pain or bloating, other than gas cramps.  2. Chest pain.  3. Back pain.  4. Signs of infection such as: chills or fever occurring within 24 hours   after the procedure.  5. Rectal bleeding, which would show as bright red, maroon, or black stools.   (A tablespoon of blood from the rectum is not serious, especially if   hemorrhoids are present.)  6. Vomiting.  7. Weakness or dizziness.  GO DIRECTLY TO THE NEAREST EMERGENCY ROOM IF YOU HAVE ANY OF THE FOLLOWING:      Difficulty breathing              Chills and/or fever over 101 F   Persistent vomiting and/or vomiting blood   Severe abdominal pain   Severe chest pain   Black, tarry stools   Bleeding- more than one  tablespoon   Any other symptom or condition that you feel may need urgent attention  Your doctor recommends these additional instructions:  If any biopsies were taken, your doctors clinic will contact you in 1 to 2   weeks with any results.  You are being discharged to home.   You have a contact number available for emergencies.  The signs and symptoms   of potential delayed complications were discussed with you.  You may return   to normal activities tomorrow.  Written discharge instructions were   provided to you.   Resume your previous diet.   Continue your present medications.   Resume taking Plavix (clopidogrel) at your prior dose today.   Return to your GI clinic.  For questions, problems or results please call your physician - Herrera Sanchez MD at Work:  [phone].  [facility] [place], EMERGENCY ROOM PHONE NUMBER: [phone]  IF A COMPLICATION OR EMERGENCY SITUATION ARISES AND YOU ARE UNABLE TO REACH   YOUR PHYSICIAN - GO DIRECTLY TO THE EMERGENCY ROOM.  Herrera Sanchez MD  3/23/2018 10:40:44 AM  This report has been verified and signed electronically.

## 2018-03-26 NOTE — PHYSICIAN QUERY
PT Name: Neville Cruz  MR #: [id number]    Physician Query Form - Consultant Diagnosis Clarification     CDS/: Sabrina Knapp RN              Contact information: [phone]  This form is a permanent document in the medical record.     Query Date: March 26, 2018      By submitting this query, we are merely seeking further clarification of documentation.  Please utilize your independent clinical judgment when addressing the question(s) below.      The Medical record contains the following:   Diagnosis Supporting Clinical Information Location in Medical Record   Mild reactive chronic gastritis FINAL PATHOLOGIC DIAGNOSIS  GASTRIC MUCOSA: MILD REACTIVE CHRONIC GASTRITIS; NO EVIDENCE OF ACTIVE GASTRITIS,  INTESTINAL METAPLASIA, OR HELICOBACTER BY ROUTINE (H&E) STAIN. 3/16 Path report         Do you agree with the Consultants diagnosis of Mild reactive chronic gastritis?                 [ x  ]   Yes               [   ]   Yes, but it resolved prior to my assessment of the patient               [   ]   No                [   ]   Clinically insignificant               [   ]   Clinically undetermined               [   ]   Other/Clarification of findings: ___________________________________________

## 2018-03-27 NOTE — TELEPHONE ENCOUNTER
Spoke with Clemencia Cruz (daughter) regarding rescheduling pt's IR appt due to pt taking Aspirin 1 day prior. Informed Clemencia Cruz's IR procedure was rescheduled to Friday April 6, 2018 at 1 pm. Clemencia verbalized understanding. I informed Clemencia that Mr. Cruz is to stop taking Aspirin 5 days prior to his procedure and Plavix 7 days prior to his procedure. Clemencia verbalized understanding. I also informed Clemencia that Mr. Cruz follow up will be rescheduled 1 week after the procedure, Clemencia requested that the follow up appt date be on Thursday April 12, 2018 or Monday April 16, 2018 in the am. I verbalized understanding.    Spoke with Ms. Cruz again regarding her father's IR appt. Ms. Cruz was wondering if pt's appt can be moved to an earlier date. I informed Ms. Cruz that Friday April 6, 2018 was the earliest available appt that IR could schedule the pt for. Clemencia verbalized understanding.

## 2018-03-27 NOTE — TELEPHONE ENCOUNTER
----- Message from Cherie Hylton sent at 3/26/2018  8:24 AM CDT -----  Contact: Pt daughter Clemencia   Pt daughter calling regarding appt on 4/4. She would like to move appt to another day.          Clemencia call back number [phone]

## 2018-04-02 NOTE — TELEPHONE ENCOUNTER
----- Message from Tiffany Brewster sent at 4/2/2018  9:12 AM CDT -----  Contact: pt's daughter tiffany [phone]        Name of Who is Calling: pt's daughter tiffany [phone]      What is the request in detail: returned the nurse's phone call. Please call daughter      Can the clinic reply by [facility]:      What Number to Call Back if not in [facility]:

## 2018-04-02 NOTE — TELEPHONE ENCOUNTER
----- Message from Wilfredo Lanier sent at 4/2/2018  8:06 AM CDT -----  Contact: Daughter         Name of Who is Calling: Clemencia       What is the request in detail: Would like to discuss medication Plavix being taken on yesterday as a result of the pt being sick from not taking medication. States that they were advised the pt had to be off the medication for a specific duration before proceeding with biopsy, would like to reschedule biopsy as a result.       Can the clinic reply by [facility]:NO      What Number to Call Back if not in [facility]: [phone]

## 2018-04-02 NOTE — TELEPHONE ENCOUNTER
Spoke with Dr. Montiel (Radiologist in IR) regarding pt's Lung Biopsy in IR. Informed Dr. Montiel pt took Plavix and Aspirin on Sunday night and Monday morning for c/o chest pain. Informed Dr. Montiel I was calling to find out if the pt can still have his IR procedure on 4/6/18 at 1 pm done. Dr. Montiel stated that pt needs to be off blood thinners for 5 days prior to the procedure, otherwise they need to be rescheduled for another day.

## 2018-04-02 NOTE — TELEPHONE ENCOUNTER
Left a voicemail message for Clemencia Cruz informing her pt's Lung Biopsy IR appt was moved to Thursday April 12, 2018 at 10 am but pt needs to arrive 9 am to get prepped for his procedure. Left a voicemail message informing Clemencia that Mr. Cruz needs to be NPO which means nothing to eat or drink after midnight on Thursday April 12, 2018. Left a voicemail message stating Mr. Cruz needs to stop his Aspirin 5 days prior to his procedure and Plavix 7 days prior to his procedure. Also left a voicemail message stating Clemencia can call Dr. Manzo's office at [phone] if she has any further questions or concerns.

## 2018-04-02 NOTE — TELEPHONE ENCOUNTER
Spoke with Sania ( in IR) regarding Mr. Cruz's lung biopsy. Informed Sania that Dr. Manzo stated pt took his Aspirin and Plavix this morning (4/2/18) and was wondering if it was still okay for pt to receive lung biopsy in IR. Sania stated she is not sure and that I would need to speak with the Radiologist and discuss it with him. Called Radiologist at ext [phone] to discuss the pt's situation. Unable to reach Radiologist, unable to leave a message. Will attempt to call Radiologist again this afternoon.

## 2018-04-02 NOTE — TELEPHONE ENCOUNTER
Spoke with Clemencia regarding her father's IR appt. Clemencia stated her father was c/o chest tightness last night and refused to go to the ER. Pt's father took a Plavix and went to bed. Pt states today he is still sleeping. I informed Clemencia that if her father continues to c/o chest tightness, she needs to take him to the ER. Clemencia verbalized understanding.        Clemencia stated that she needs to reschedule her dad's IR appt and follow up appt with Dr. Manzo for 3 weeks (after April 15, 2018). I informed Clemencia that the longer she waits to get the procedure done, the longer it will take to get results back and start treatment if needed. Clemencia verbalized understanding. Clemencia states she wants to make sure enough of the blood thinners is absorbed by her dad's body before he has to get off of the blood thinners for the procedure. I informed Clemencia that I will call IR to reschedule her dad's appt and call her back. Clemencia verbalized understanding.

## 2018-04-12 PROBLEM — R91.8 LUNG MASS: Status: ACTIVE | Noted: 2018-01-01

## 2018-04-12 NOTE — NURSING
Ct guided lung biopsy performed. Pt tolerated well. Small post procedural hematoma noted per Dr. Spann. Pressure dsg applied. An additional dose of Fent 50 mcg given d/t pain upon inspiration. Vital signs stable. Pt per stretcher back to floor. Report will be given bedside.

## 2018-04-12 NOTE — DISCHARGE INSTRUCTIONS
Discharge Instructions Needle Biopsy: Lung  You had a procedure called a needle biopsy of one of your lungs. In this procedure, a hollow needle is used to take one or more samples of your lung tissue. The tissue is then examined under a microscope. There are several different types of needle biopsies. Two types are:  · Fine needle aspiration. A small amount of tissue is withdrawn (aspirated) using a very fine needle.  · Core biopsy. A larger tissue sample is removed for examination.  A biopsy needle is inserted through your skin into your chest and lung. This is called a transthoracic approach, which means across or through the chest (thorax). Scans are done at the same time so that your provider can find the area where he or she would like to sample tissue. Needle biopsies do not require cuts or incisions into the body like open biopsies.  Your healthcare provider will use the results of your biopsy to help diagnose your condition.  Home care  · The site of the biopsy may feel numb for a while if you received numbing medicine.  · You might have a little soreness following the needle biopsy.  · Follow your healthcare provider's instructions about removing bandages and showering or bathing.  · You may be sleepy after the biopsy if you received medicine to help you relax (sedation). You should not drive until the next day or as instructed by your healthcare provider.  · You should not do heavy lifting, a lot of stair climbing, or take part in sports the day of your biopsy. You can get back to your regular activities as instructed by your healthcare provider.  Follow-up care  Follow up with your healthcare provider, or as advised. Be sure you make an appointment with your healthcare provider to discuss the biopsy results.     When to seek medical advice  Call your healthcare provider right away if any of these occur:  · Infection. You might have redness, pain, swelling, or drainage at the site of your  biopsies.  · Bleeding. You might also have bleeding at the site of your biopsies.  · Coughing up blood. This may only be a small amount.  · Collapsed lung (pneumothorax). This means that air from your lungs leaks out into the spaces between your lungs and chest wall. It can lead to trouble breathing and a collapsed lung. Watch for trouble breathing, a fast pulse, sharp pains in your chest or shoulder, and bluish skin   Date Last Reviewed: 2/1/2017 © 2000-2017 [employer]. [address] [place] [address]. All rights reserved. This information is not intended as a substitute for professional medical care. Always follow your healthcare professional's instructions.        Anesthesia: Monitored Anesthesia Care (MAC)    Anesthesia Safety  · Have an adult family member or friend drive you home after the procedure.  · For the first 24 hours after your surgery:  ¨ Do not drive or use heavy equipment.  ¨ Do not make important decisions or sign documents.  ¨ Avoid alcohol.  ¨ Have someone stay with you, if possible. They can watch for problems and help keep you safe.

## 2018-04-12 NOTE — PROCEDURES
Radiology Post-Procedure Note    Pre Op Diagnosis: lung mass  Post Op Diagnosis: Same    Procedure: biopsy    Procedure performed by: Liban Spann MD    Written Informed Consent Obtained: Yes  Specimen Removed: YES multiple cores  Estimated Blood Loss: Minimal    Findings:   Successful lingular mass biopsy.  Small post procedural hematoma, no pneumothorax.    Patient tolerated procedure well.    @SIG@

## 2018-04-12 NOTE — H&P
Consult/H&P No te  Interventional Radiology      Reason for Consult: lung nodules/masses    SUBJECTIVE:     Chief Complaint: lung nodules    History of Present Illness: 81 yo M recently admitted and found to have a L lung mass and multiple B nodules.    Past Medical History:   Diagnosis Date    Age-related osteoporosis without current pathological fracture 6/6/2017    Benign prostatic hyperplasia 05/10/2017    Cataract     CKD (chronic kidney disease) stage 3, GFR 30-59 ml/min 5/12/2017    Coronary artery disease involving native coronary artery of native heart without angina pectoris 05/10/2017    cards/Dr. Melva Reilly    Esophageal cancer 1997    s/p surgery, XRT    H/O heart artery stent     LAD 9/22/16 Zotarolimus eluting coronary stent system- [facility]    Hypertension     Primary osteoarthritis of right knee 05/10/2017    sees ortho, knee replacement recommended     Past Surgical History:   Procedure Laterality Date    CATARACT EXTRACTION Right     july 2017    COLONOSCOPY N/A 3/23/2018    Procedure: COLONOSCOPY;  Surgeon: Herrera Sanchez MD;  Location: [facility] ([address] [place] [address]);  Service: Endoscopy;  Laterality: N/A;  Plavix/OK to hold 5 days per Dr VENUS Morgan/egd order cx/had in hospital recently/svn    CORONARY ANGIOPLASTY WITH STENT PLACEMENT      LCx 1999, mid RCA 2010, LAD 9/2016; cards/Dr. Melva Reilly    THROAT SURGERY      2/2 throat cancer     Family History   Problem Relation Age of Onset    Cancer Mother      stomach    Hypertension Mother     Hypertension Daughter     Hypertension Son     Hypertension Daughter     Blindness Neg Hx     Amblyopia Neg Hx     Cataracts Neg Hx     Glaucoma Neg Hx     Macular degeneration Neg Hx     Retinal detachment Neg Hx     Strabismus Neg Hx     Diabetes Neg Hx     Stroke Neg Hx     Thyroid disease Neg Hx     Colon cancer Neg Hx     Colon polyps Neg Hx     Esophageal cancer Neg Hx     Irritable bowel syndrome Neg Hx     Inflammatory  bowel disease Neg Hx     Stomach cancer Neg Hx      Social History   Substance Use Topics    Smoking status: Former Smoker     Years: 50.00     Types: Cigarettes    Smokeless tobacco: Never Used    Alcohol use No       Review of Systems:  Constitutional/General:No fever, chills, change in appetite or weight loss.  Hematological/Immuno: no known coagulopathies  Respiratory: no shortness of breath  Cardiovascular: no chest pain  Gastrointestinal: no abdominal pain  Genito-Urinary: no dysuria  Neurological: no TIA or stroke symptoms  Psychiatric: normal mood/affect, good insight/judgement      OBJECTIVE:     Vital Signs Range (Last 24H):  Temp:  [97 °F (36.1 °C)]   Pulse:  [76]   Resp:  [18]   BP: (155)/(75)   SpO2:  [99 %]     Physical Exam:  General- Patient alert and oriented x3 in NAD  CV- Regular rate and rhythm  Resp-  No increased WOB  GI- Non tender/non-distended  Neuro-  No focal deficits noted.     Physical Exam  Body mass index is 25.58 kg/m².    Scheduled Meds:   Continuous Infusions:   PRN Meds:    Allergies: Review of patient's allergies indicates:  No Known Allergies    Labs:    Recent Labs  Lab 04/12/18 0938   INR 1.0       Recent Labs  Lab 04/12/18 0938   WBC 11.90   HGB 11.3*   HCT 37.1*   MCV 82       No results for input(s): GLU, NA, K, CL, CO2, BUN, CREATININE, CALCIUM, MG, ALT, AST, ALBUMIN, BILITOT, BILIDIR in the last 168 hours.    Vitals (Most Recent):  Temp: 97 °F (36.1 °C) (04/12/18 0912)  Pulse: 76 (04/12/18 0912)  Resp: 18 (04/12/18 0912)  BP: (!) 155/75 (04/12/18 0912)  SpO2: 99 % (04/12/18 0912)    ASA: 3  Mallampati: 2    {Consent obtained    ASSESSMENT/PLAN:     Lung biopsy.  Moderate sedation.    Active Hospital Problems    Diagnosis  POA    Lung mass [R91.8]  Yes      Resolved Hospital Problems    Diagnosis Date Resolved POA   No resolved problems to display.           Liban Spann MD

## 2018-04-13 NOTE — DISCHARGE SUMMARY
Radiology Discharge Summary      Hospital Course: No complications    Admit Date: 4/12/2018  Discharge Date: 04/13/2018     Instructions Given to Patient: Yes  Diet: Resume prior diet  Activity: activity as tolerated    Description of Condition on Discharge: Stable  Vital Signs (Most Recent): Temp: 98.5 °F (36.9 °C) (04/12/18 1150)  Pulse: 67 (04/12/18 1440)  Resp: 16 (04/12/18 1440)  BP: 110/66 (04/12/18 1440)  SpO2: 97 % (04/12/18 1440)    Discharge Disposition: Home    Discharge Diagnosis: lung mass, post biopsy     Follow-up: per referring    @SIG@

## 2018-04-13 NOTE — PLAN OF CARE
Neville Anthony has met all discharge criteria from Phase II. Vital Signs are stable, ambulating  without difficulty. Discharge instructions given, patient verbalized understanding. Discharged from facility via wheelchair in stable condition.

## 2018-04-18 NOTE — PROGRESS NOTES
Cardiology Progress Note:    Patient ID:  Neville Cruz is a 82 y.o. male who presents for follow-up of CAD and hypertension.     History of Present Illness:  Pt was las seen in Cardiology Clinic by Dr Morgan and me on 1/3/2018. He is on DAPT and he recently suspended the Plavix for several days because of lung biopsy. Patient has had no symptoms suggestive of myocardial ischemia, heart failure and arrhythmia.      Was previously seen at [facility] and his last PCI was in Sept 2016.  States he was told he had previous MI and had chest pain prior to his stent in 2010. Has had 3 prior stents LCx in 1999, mRCA 2010 and mLAD in Sept 2016.      Past Medical History Includes:  CAD s/p PCI and 3 stents LCx in 1999, mRCA 2010 and mLAD in Sept 2016; HTN; CKD; former tobacco; s/p esophageal cancer treated with radiation      Full Medication List Includes:  Outpatient Encounter Prescriptions as of 4/19/2018   Medication Sig Dispense Refill    acetaminophen-codeine 300-30mg (TYLENOL #3) 300-30 mg Tab TAKE 1 TABLET BY MOUTH EVERY 12 HOURS AS NEEDED FOR SEVERE PAIN 60 tablet 0    amLODIPine (NORVASC) 5 MG tablet Take 1 tablet (5 mg total) by mouth daily as needed. Take if systolic blood pressure over 130. 90 tablet 3    aspirin 81 MG Chew Take 81 mg by mouth once daily.      cholecalciferol, vitamin D3, 1,000 unit capsule Take 1 capsule (1,000 Units total) by mouth once daily. 90 capsule 3    clopidogrel (PLAVIX) 75 mg tablet Take 1 tablet (75 mg total) by mouth once daily. 90 tablet 3    metoprolol succinate (TOPROL-XL) 50 MG 24 hr tablet Take 1 tablet (50 mg total) by mouth once daily. 90 tablet 3    omeprazole (PRILOSEC) 20 MG capsule TAKE 1 CAPSULE BY MOUTH ONCE DAILY AT 6 AM. 90 capsule 2    polyethylene glycol (GLYCOLAX) 17 gram/dose powder Take 17 g by mouth once daily. 1 Bottle 6    pravastatin (PRAVACHOL) 40 MG tablet Take 1 tablet (40 mg total) by mouth once daily. 90 tablet 3    tamsulosin (FLOMAX) 0.4 mg Cp24  Take 1 capsule (0.4 mg total) by mouth once daily. 90 capsule 3    zolpidem (AMBIEN) 5 MG Tab TAKE 1 TABLET(5 MG) BY MOUTH EVERY NIGHT AS NEEDED FOR INSOMNIA 90 tablet 1     No facility-administered encounter medications on file as of 4/19/2018.        Review of Systems:  Review of Systems   Constitution: Positive for weight loss. Negative for decreased appetite, weakness, malaise/fatigue and weight gain.   HENT: Negative for ear discharge, hearing loss and nosebleeds.    Eyes: Negative for blurred vision, pain, photophobia and visual disturbance.   Cardiovascular: Negative for chest pain, claudication, cyanosis, dyspnea on exertion, irregular heartbeat, leg swelling, near-syncope, orthopnea, palpitations, paroxysmal nocturnal dyspnea and syncope.   Respiratory: Negative for cough, hemoptysis, shortness of breath, sleep disturbances due to breathing, snoring, sputum production and wheezing.    Endocrine: Negative for cold intolerance and polydipsia.   Hematologic/Lymphatic: Negative for adenopathy and bleeding problem. Does not bruise/bleed easily.   Skin: Negative for poor wound healing, rash and suspicious lesions.   Musculoskeletal: Negative for arthritis, back pain, falls, gout, joint pain, muscle cramps, myalgias and neck pain.   Gastrointestinal: Negative for anorexia, change in bowel habit, constipation, diarrhea, excessive appetite, hematemesis, hematochezia, melena, nausea and vomiting.   Genitourinary: Negative for decreased libido, dysuria, flank pain and frequency.   Neurological: Negative for difficulty with concentration, excessive daytime sleepiness, dizziness, focal weakness, light-headedness, loss of balance, seizures and vertigo.   Psychiatric/Behavioral: Negative for altered mental status, memory loss and substance abuse. The patient is not nervous/anxious.    Allergic/Immunologic: Negative for HIV exposure and hives.       Physical Exam:  /74 (BP Location: Left arm, Patient Position:  "Sitting, BP Method: Small (Automatic))   Pulse 77   Ht 5' 4" (1.626 m)   Wt 62.5 kg (137 lb 12.6 oz)   BMI 23.65 kg/m²   Physical Exam   Constitutional: He is oriented to person, place, and time. He appears well-developed and well-nourished.   HENT:   Head: Normocephalic.   Right Ear: External ear normal.   Left Ear: External ear normal.   Nose: Nose normal.   Inspection of lips, teeth and gums normal   Eyes: EOM are normal. Pupils are equal, round, and reactive to light. No scleral icterus.   Neck: Normal range of motion. Neck supple. No JVD present. No tracheal deviation present. No thyromegaly present.   Cardiovascular: Normal rate, regular rhythm, S1 normal, S2 normal and intact distal pulses.  Exam reveals no gallop and no friction rub.    No murmur heard.  Pulses:       Carotid pulses are 2+ on the right side, and 2+ on the left side.  Pulmonary/Chest: Effort normal and breath sounds normal.   Abdominal: Bowel sounds are normal. He exhibits no distension. There is no hepatosplenomegaly. There is no tenderness. There is no guarding.   Musculoskeletal: Normal range of motion. He exhibits no edema or tenderness.   Lymphadenopathy:   Palpation of neck and groin lymph nodes normal   Neurological: He is alert and oriented to person, place, and time. No cranial nerve deficit. He exhibits normal muscle tone. Coordination normal.   Skin: Skin is dry.   No ankle nor pretibial edema   Psychiatric: His behavior is normal. Judgment and thought content normal.        Blood Tests:  Lab Results   Component Value Date     03/16/2018    K 4.9 03/16/2018     (H) 03/16/2018    CO2 18 (L) 03/16/2018    BUN 16 03/16/2018    CREATININE 1.5 (H) 03/16/2018     (H) 03/16/2018    HGBA1C 5.4 03/13/2018    MG 1.2 (L) 03/16/2018    AST 17 03/12/2018    ALT 14 03/12/2018    ALBUMIN 3.9 03/12/2018    PROT 7.3 03/12/2018    BILITOT 0.8 03/12/2018    WBC 11.90 04/12/2018    HGB 11.3 (L) 04/12/2018    HCT 37.1 (L) " 04/12/2018    MCV 82 04/12/2018     04/12/2018    INR 1.0 04/12/2018    TSH 1.543 10/03/2017       Lab Results   Component Value Date    CHOL 132 05/10/2017    HDL 37 (L) 05/10/2017    TRIG 128 05/10/2017       Lab Results   Component Value Date    LDLCALC 69.4 05/10/2017       Urine Tests:  Lab Results   Component Value Date    COLORU Yellow 03/13/2018    APPEARANCEUA Clear 03/13/2018    PHUR 6.0 03/13/2018    SPECGRAV 1.015 03/13/2018    PROTEINUA Negative 03/13/2018    GLUCUA Trace (A) 03/13/2018    KETONESU Negative 03/13/2018    BILIRUBINUA Negative 03/13/2018    OCCULTUA Negative 03/13/2018    NITRITE Negative 03/13/2018    UROBILINOGEN Negative 03/13/2018    LEUKOCYTESUR Negative 03/13/2018    PROTEINURINE 9 03/13/2018    CREATRANDUR 110.0 03/13/2018    UTPCR 0.08 03/13/2018         ECG (12-MAR-2018)  Normal sinus rhythm  Normal ECG        I have reviewed the following:     Details / Date    []   Labs     []   Imaging     []   Cardiology Procedures     []   Other      Assessment and Plan:     1. Benign essential HTN    2. Coronary artery disease involving native coronary artery of native heart without angina pectoris         Coronary artery disease involving native coronary artery of native heart without angina pectoris  Asymptomatic. No clinical evidence to suggest myocardial ischemia    Continue present therapy:  ASA 81 mg qd  Plavix 75 mg qd  Pravastatin 40 mg qd  Metoprolol succinate 50 mg qd    Benign essential HTN  BP well controlled    Continue present therapy:  Amlodipine 5 mg qd  Metoprolol succinate 50 mg qd      Follow-up in about 1 year (around 4/19/2019).      Georgi Pickens MD

## 2018-04-19 NOTE — LETTER
April 19, 2018      Ebonie Grimes MD  [address]  [facility] [address]           [facility]ology On[facility]  [address], [address]  [facility] [address]  Phone: [phone]          Patient: Neville Cruz   MR Number: [id number]   YOB: 1935   Date of Visit: 4/19/2018       Dear Dr. Ebonie Grimes:    Thank you for referring Neville Cruz to me for evaluation. Attached you will find relevant portions of my assessment and plan of care.    If you have questions, please do not hesitate to call me. I look forward to following Neville Cruz along with you.    Sincerely,    Heather Manzo MD    Enclosure  CC:  No Recipients    If you would like to receive this communication electronically, please contact [email or web address] or [phone] to request more information on [facility] access.    For providers and/or their staff who would like to refer a patient to [facility], please contact us through our one-stop-shop provider referral line, [facility], at [phone].    If you feel you have received this communication in error or would no longer like to receive these types of communications, please e-mail [email or web address]

## 2018-04-19 NOTE — ASSESSMENT & PLAN NOTE
BP well controlled    Continue present therapy:  Amlodipine 5 mg qd  Metoprolol succinate 50 mg qd

## 2018-04-19 NOTE — ASSESSMENT & PLAN NOTE
Asymptomatic. No clinical evidence to suggest myocardial ischemia    Continue present therapy:  ASA 81 mg qd  Plavix 75 mg qd  Pravastatin 40 mg qd  Metoprolol succinate 50 mg qd

## 2018-04-19 NOTE — Clinical Note
PET scan, MRI brain without contrast, both first available Refer to Colleen for nutrition consult CBC, CMP, TSH today RTC on 5/3/18 with CBC, CMP

## 2018-04-23 NOTE — TELEPHONE ENCOUNTER
Daughter was calling to speak with Dr. Manzo about pt's cancer prognosis. She would like to know how long pt has to live? Is the treatment worth it? I informed pt that I will let Dr. Manzo know. Daughter voiced understanding.

## 2018-04-23 NOTE — TELEPHONE ENCOUNTER
----- Message from Wilfredo Lanier sent at 4/23/2018 12:20 PM CDT -----  Contact: Pt            Name of Who is Calling: Clemencia Haddad     What is the request in detail: Requesting call from  to discuss concerns.      Can the clinic reply by [facility]: NO      What Number [facility] Back [facility]: [phone] (cell)or [phone] (work)

## 2018-04-25 NOTE — TELEPHONE ENCOUNTER
Spoke with Clemencia regarding prognosis. It also depends on molecular testing results, how well he tolerates treatment, and how well his cancer responds to treatment. Molecular testing results are not back yet. I will see him next week in the office. Clemencia understands and agrees with the plan

## 2018-04-25 NOTE — TELEPHONE ENCOUNTER
----- Message from Daniele Lynne sent at 4/25/2018  9:40 AM CDT -----  Contact: Clemencia (Pt Daughter)            Name of Who is Calling: Clemencia (Pt Daughter)      What is the request in detail: Clemencia (Pt Daughter) states she was told by nurse on 04/23/18 that Dr. Manzo would be giving her a call but has not received a call as of yet      Can the clinic reply by [facility]: no      What Number to Call Back if not in [facility]: cell [phone] or work [phone] ext [phone]

## 2018-04-26 NOTE — PROGRESS NOTES
"Medical Nutrition Therapy Oncology Progress Note    Name: Neville Cruz MRN: [id number]  : 1935    Age: 83 y.o.  Ethnicity: /Black Language: English    Diagnosis: No diagnosis found.    Chemo Regimen:    Referring MD: Dr. Manzo Frequency:  Radiation: No           Goal of Cancer treatment n/a         Nutrition Assessment     Chief Complaint:   Chief Complaint   Patient presents with    Nutrition Counseling    Lung Cancer        Anthropometric Measurements:  Height: 5' 4" (1.626 m)  Current Weight: 62.3 kg (137 lb 5.6 oz)  Ideal Body Weight: 130#  Percent Ideal Body Weight:: 105%  BMI: Body mass index is 23.58 kg/m².     Weight History:   Wt Readings from Last 8 Encounters:   18 62.3 kg (137 lb 5.6 oz)   18 62.5 kg (137 lb 12.6 oz)   18 62.5 kg (137 lb 12.6 oz)   18 67.6 kg (149 lb)   18 67.4 kg (148 lb 8 oz)   18 63.8 kg (140 lb 10.5 oz)   18 65.6 kg (144 lb 10 oz)   18 64.5 kg (142 lb 3.2 oz)     Medical Health History:  Feeding tube placed: No  Pre-treatment: N/A    Past Surgical History:   Procedure Laterality Date    CATARACT EXTRACTION Right     2017    COLONOSCOPY N/A 3/23/2018    Procedure: COLONOSCOPY;  Surgeon: Herrera Sanchez MD;  Location: [address]);  Service: Endoscopy;  Laterality: N/A;  Plavix/OK to hold 5 days per Dr VENUS Morgan/egd order cx/had in hospital recently/svn    CORONARY ANGIOPLASTY WITH STENT PLACEMENT      LCx , mid RCA , LAD 2016; cards/Dr. Melva Reilly    THROAT SURGERY      /2 throat cancer        Medications:   Current Outpatient Prescriptions:     acetaminophen-codeine 300-30mg (TYLENOL #3) 300-30 mg Tab, TAKE 1 TABLET BY MOUTH EVERY 12 HOURS AS NEEDED FOR SEVERE PAIN, Disp: 60 tablet, Rfl: 0    amLODIPine (NORVASC) 5 MG tablet, Take 1 tablet (5 mg total) by mouth daily as needed. Take if systolic blood pressure over 130., Disp: 90 tablet, Rfl: 3    aspirin 81 MG Chew, Take 81 mg " by mouth once daily., Disp: , Rfl:     cholecalciferol, vitamin D3, 1,000 unit capsule, Take 1 capsule (1,000 Units total) by mouth once daily., Disp: 90 capsule, Rfl: 3    clopidogrel (PLAVIX) 75 mg tablet, Take 1 tablet (75 mg total) by mouth once daily., Disp: 90 tablet, Rfl: 3    metoprolol succinate (TOPROL-XL) 50 MG 24 hr tablet, Take 1 tablet (50 mg total) by mouth once daily., Disp: 90 tablet, Rfl: 3    omeprazole (PRILOSEC) 20 MG capsule, TAKE 1 CAPSULE BY MOUTH ONCE DAILY AT 6 AM., Disp: 90 capsule, Rfl: 2    polyethylene glycol (GLYCOLAX) 17 gram/dose powder, Take 17 g by mouth once daily., Disp: 1 Bottle, Rfl: 6    pravastatin (PRAVACHOL) 40 MG tablet, Take 1 tablet (40 mg total) by mouth once daily., Disp: 90 tablet, Rfl: 3    tamsulosin (FLOMAX) 0.4 mg Cp24, Take 1 capsule (0.4 mg total) by mouth once daily., Disp: 90 capsule, Rfl: 3    zolpidem (AMBIEN) 5 MG Tab, TAKE 1 TABLET(5 MG) BY MOUTH EVERY NIGHT AS NEEDED FOR INSOMNIA, Disp: 90 tablet, Rfl: 1    Last Labs:  Last Labs:  Glucose   Date Value Ref Range Status   04/19/2018 104 70 - 110 mg/dL Final   03/16/2018 111 (H) 70 - 110 mg/dL Final     BUN, Bld   Date Value Ref Range Status   04/19/2018 17 8 - 23 mg/dL Final   03/16/2018 16 8 - 23 mg/dL Final     Creatinine   Date Value Ref Range Status   04/19/2018 1.6 (H) 0.5 - 1.4 mg/dL Final   03/16/2018 1.5 (H) 0.5 - 1.4 mg/dL Final     Sodium   Date Value Ref Range Status   04/19/2018 139 136 - 145 mmol/L Final   03/16/2018 139 136 - 145 mmol/L Final     Potassium   Date Value Ref Range Status   04/19/2018 4.4 3.5 - 5.1 mmol/L Final   03/16/2018 4.9 3.5 - 5.1 mmol/L Final     Phosphorus   Date Value Ref Range Status   03/16/2018 2.2 (L) 2.7 - 4.5 mg/dL Final   03/15/2018 2.6 (L) 2.7 - 4.5 mg/dL Final     Calcium   Date Value Ref Range Status   04/19/2018 8.9 8.7 - 10.5 mg/dL Final   03/16/2018 7.9 (L) 8.7 - 10.5 mg/dL Final     No results found for: PREALBUMIN  Total Protein   Date Value Ref  Range Status   04/19/2018 6.4 6.0 - 8.4 g/dL Final   03/12/2018 7.3 6.0 - 8.4 g/dL Final     Cholesterol   Date Value Ref Range Status   05/10/2017 132 120 - 199 mg/dL Final     Comment:     The National Cholesterol Education Program (NCEP) has set the  following guidelines (reference ranges) for Cholesterol:  Optimal.....................<200 mg/dL  Borderline High.............200-239 mg/dL  High........................> or = 240 mg/dL       Hemoglobin A1C   Date Value Ref Range Status   03/13/2018 5.4 4.0 - 5.6 % Final     Comment:     According to ADA guidelines, hemoglobin A1c <7.0% represents  optimal control in non-pregnant diabetic patients. Different  metrics may apply to specific patient populations.   Standards of Medical Care in Diabetes-2016.  For the purpose of screening for the presence of diabetes:  <5.7%     Consistent with the absence of diabetes  5.7-6.4%  Consistent with increasing risk for diabetes   (prediabetes)  >or=6.5%  Consistent with diabetes  Currently, no consensus exists for use of hemoglobin A1c  for diagnosis of diabetes for children.  This Hemoglobin A1c assay has significant interference with fetal   hemoglobin   (HbF). The results are invalid for patients with abnormal amounts of   HbF,   including those with known Hereditary Persistence   of Fetal Hemoglobin. Heterozygous hemoglobin variants (HbAS, HbAC,   HbAD, HbAE, HbA2) do not significantly interfere with this assay;   however, presence of multiple variants in a sample may impact the %   interference.     12/07/2017 5.5 4.0 - 5.6 % Final     Comment:     According to ADA guidelines, hemoglobin A1c <7.0% represents  optimal control in non-pregnant diabetic patients. Different  metrics may apply to specific patient populations.   Standards of Medical Care in Diabetes-2016.  For the purpose of screening for the presence of diabetes:  <5.7%     Consistent with the absence of diabetes  5.7-6.4%  Consistent with increasing risk for  diabetes   (prediabetes)  >or=6.5%  Consistent with diabetes  Currently, no consensus exists for use of hemoglobin A1c  for diagnosis of diabetes for children.  This Hemoglobin A1c assay has significant interference with fetal   hemoglobin   (HbF). The results are invalid for patients with abnormal amounts of   HbF,   including those with known Hereditary Persistence   of Fetal Hemoglobin. Heterozygous hemoglobin variants (HbAS, HbAC,   HbAD, HbAE, HbA2) do not significantly interfere with this assay;   however, presence of multiple variants in a sample may impact the %   interference.       Hemoglobin   Date Value Ref Range Status   04/19/2018 11.3 (L) 14.0 - 18.0 g/dL Final   04/12/2018 11.3 (L) 14.0 - 18.0 g/dL Final     Hematocrit   Date Value Ref Range Status   04/19/2018 36.8 (L) 40.0 - 54.0 % Final   04/12/2018 37.1 (L) 40.0 - 54.0 % Final     Iron   Date Value Ref Range Status   02/02/2018 34 (L) 45 - 160 ug/dL Final     No components found for: FROLATE  Vit D, 25-Hydroxy   Date Value Ref Range Status   12/21/2017 22 (L) 30 - 96 ng/mL Final     Comment:     Vitamin D deficiency.........<10 ng/mL                              Vitamin D insufficiency......10-29 ng/mL       Vitamin D sufficiency........> or equal to 30 ng/mL  Vitamin D toxicity............>100 ng/mL     10/03/2017 24 (L) 30 - 96 ng/mL Final     Comment:     Vitamin D deficiency.........<10 ng/mL                              Vitamin D insufficiency......10-29 ng/mL       Vitamin D sufficiency........> or equal to 30 ng/mL  Vitamin D toxicity............>100 ng/mL       WBC   Date Value Ref Range Status   04/19/2018 13.39 (H) 3.90 - 12.70 K/uL Final   04/12/2018 11.90 3.90 - 12.70 K/uL Final       Client History/Food Access:    Living Situation: Lives alone   Who: Shops for Groceries? Family   Who : Prepares meals? Patient and Family   Who: Fills prescriptions? Patient and Family   Are there financial difficulties purchasing food? No   Personal  History (occupation, physical activity level, exercise):      Baseline for Outcomes Monitoring  Food and Nutrition History: Pt here with daughter, Clemencia, for nutrition counseling. Pt with current weight of 137# which is 12# weight loss in 2 weeks. Pt reports UBW of 150-155#. Pt complains of poor appetite and trouble chewing due to dental issues. Pt states he needs dental work, but can't do it at this time due to diagnosis. Pt eats 2 small meals/day (breakfast, dinner) and drinks 1 Boost Original. Daughter typically brings pt evening meal. Pt with CKD and previously had hyperkalemia. Pt is very familiar with which foods are high in potassium and he tries to avoid those foods. Provided pt list of high, moderate, and low potassium foods. Brainstormed lunch ideas (avoiding high potassium): canned peaches or pears, applesauce, oatmeal, boiled eggs, grilled cheese). Encouraged pt to eat something mid day for additional calories and protein. Also, encouraged pt to switch from Boost Original to Boost Plus which has more calories and less potassium as well. Pt to drink 2 Boost Plus daily. Encouraged pt to use butter and olive oil freely for additional calories. Stressed importance of adequate intake for energy as pt complains of fatigue.  24-hour recall:  Breakfast: cereal with whole milk  Lunch: skips  Dinner: coleslaw, cajun rice, and a few bites of fried chicken (from [employer])  Drinks: Coke, water, and Boost Original    Nutritional Needs:  Estimated Needs Method Use    1900 kcal/day [] Predictive Equation: Kim-Abington   [x]  30 kcal/kg   Protein 60 g 1.0 gm/kg/day   Fluid 1600 ml 25 ml/kg/day     Food/Nutrient Intake (oral, enteral or parenteral) and Patient Behaviors     Calorie intake: Inadequate   Protein intake: Inadequate     Yes/No    Yes Uses medical food supplements   Yes Cooking techniques to minimize fatigue   Yes Currently modifying food textures   No Able to maintain usual physical actiivty     Nutrition  Diagnosis     Nutrition Diagnosis Related to (Etiology) As Evidenced By (Signs/Symptoms)   Inadequate energy intake Decreased ability to consume sufficient energy Dentation, 8% weight loss in 2 weeks, poor appetite eating 2 small meals/day   Malnutrition Physiological causes 8% weight loss in 2 weeks; less than 75% estimated intake x 1 month; loss of muscle and fat mass            Nutritional Intervention and Prescription        Nutrition Intervention Medical food supplement: Commercial beverage   Goals/Expected Outcomes Pt to drink 2 Boost Plus daily for additional 720 calories and 28 g protein.   Progress Initial     Nutrition Intervention Schedule of food/fluids   Goals/Expected Outcomes Pt to eat at least 3 meals/day with 2 Boost Plus.   Progress Initial     Nutrition Intervention Texture-modified diet, Energy-modified diet and Protein-modified diet   Goals/Expected Outcomes Pt to choose high calorie, high protein soft foods and beverages as tolerated to maintain weight.   Progress Initial     Pt needs education? yes (see intervention)    Coordination of Nutrition Care: Comments:   Collaboration with other providers MD         Monitoring and Evaluation     Monitor: weight    Next Visit: PRN    Materials Provided:  1. RD contact information 2. Potassium content of food               Total time: 30 minutes    Nutrition Score:      ©2010 [employer] Oncology Toolkit   Answers for HPI/ROS submitted by the patient on 4/24/2018   appetite change : Yes  unexpected weight change: Yes  visual disturbance: No  cough: No  shortness of breath: Yes  chest pain: No  abdominal pain: No  diarrhea: No  frequency: Yes  back pain: Yes  rash: No  headaches: No  adenopathy: No  nervous/ anxious: Yes

## 2018-05-03 PROBLEM — C34.92: Status: ACTIVE | Noted: 2018-01-01

## 2018-05-03 NOTE — Clinical Note
RTC on 5/14 at 2 pm, CBC, CMP, TSH, see me, then infusion appt (Keytruda) Please check with prior auth for carboplatin and Keytruda

## 2018-05-03 NOTE — TELEPHONE ENCOUNTER
Guardian [employer] completed for Neville Cruz and will be dropped off at [employer] at [facility].

## 2018-05-03 NOTE — PROGRESS NOTES
PROGRESS NOTE    Subjective:       Patient ID: Neville Cruz is a 83 y.o. male.    Chief Complaint: follow up for metastatic lung adenocarcinoma    Oncologic History:  1. Mr. Cruz is an 83 yo AAM with history of esophageal cancer s/p radiation in 1997, CAD s/p PCI in 1999, 2010 and 2016, CKD stage IV, who saw me on 4/19/18 for further management of newly diagnosed lung cancer. Smoked 2 PPD before but stopped in 1997 when his esophageal cancer was diagnosed. He initially presented to ED on 3/12/18 with abdominal cramping and diarrhea. CT A/P without contrast on 3/13/18 showed wall thickening and extending from the splenic flexure to the rectum. Innumerable pulmonary nodules and lytic lesions in the lumbosacral spine c/w metastatic disease. CT chest without contrast on 3/15/18 showed innumerable diffuse pulmonary nodules c/w metastatic disease. 8.8 cm opacity in the left upper lobe, highly suspicious for primary lung malignancy. There is also peribronchial and septal thickening in the ESTRELLITA suspicious for lympangitic carcinomatosis and trace pleural fluid is seen. Multiple lucent and sclerotic bone lesions c/w metastatic disease, with probably pathologic fracture of the left lateral 6th rib. He was on ASA and Plavix, therefore his procedures were scheduled as outpatient. Colonoscopy on 3/23/18 showed hemorrhoids and diverticulosis, but no masses. IR biopsy of the lingular mass was performed on 4/12/18. Result showed mucinous adenocarcinoma. Molecular testing was negative for EGFR, PD-L1 55%. Not enough tissue to test for ALK and ROS1.   2. PET scan on 4/27/18 showed a primary lung mass left upper lobe invading the pre-vascular anterior mediastinum.  SUV max 7.34.  There are diffuse bone metastases seen throughout the entire central axial red marrow skeleton.  For index purposes medial left iliac bone SUV max 8.17.  There are diffuse lung metastases  "bilaterally.  Right posterior pleural based SUV max 4.02.  There is a left pleural effusion.  There is a small pericardial effusion.  MRI brain without contrast showed no overt intracranial metastases.     INTERVAL HISTORY:   Mr. Cruz returns today accompanied by his daughter, Clemencia, for follow up of metastatic lung adenocarcinoma. Since last seen, his molecular test results came back as "wild type for EGFR, PD-L1 55% high expression, not enough tissue to test for ALK and ROS1". PET scan on 18 showed a primary lung mass left upper lobe invading the pre-vascular anterior mediastinum.  SUV max 7.34.  There are diffuse bone metastases seen throughout the entire central axial red marrow skeleton.  For index purposes medial left iliac bone SUV max 8.17.  There are diffuse lung metastases bilaterally.  Right posterior pleural based SUV max 4.02.  There is a left pleural effusion.  There is a small pericardial effusion." MRI brain without contrast showed no overt intracranial mets.     He currently feels okay but does feel that he is getting more and more tired. He feels tired after taking a shower. Appetite not good. Talked to nutrition already and is taking boost.     ECO    ROS:   See interval history, otherwise negative.    Physical Examination:   Vital signs reviewed.   Gen: well hydrated, well developed, in no acute distress.  HEENT: normocephalic, anicteric sclerae, PERRLA, EOMI, oropharynx clear  Neck: supple, no JVD, thyromegaly, cervical or supraclavicular LAD  Lungs: CTAB, no wheezes or rales  Heart: RRR, no M/R/G  Abdomen: soft, no tenderness, non-distended, no hepatosplenomegaly, mass, or hernia. BS present  Ext: no clubbing, cyanosis, or edema  Neuro: alert and oriented x 4, no focal neuro deficit  Skin: no rash, erythema, open wound or ulcers  Psych: pleasant and appropriate mood and affect    Objective:     Diagnostic Tests:  PET scan :  There is a primary lung mass left upper lobe " invading the pre-vascular anterior mediastinum.  SUV max 7.34.  There are diffuse bone metastases seen throughout the entire central axial red marrow skeleton.  For index purposes medial left iliac bone SUV max 8.17.  There are diffuse lung metastases bilaterally.  Right posterior pleural based SUV max 4.02.  There is a left pleural effusion.  There is a small pericardial effusion.    Laboratory Data:  Labs reviewed. Kidney function stable.     Assessment/Plan:     1. Adenocarcinoma of lung, stage 4, left    2. Metastasis to bone    3. Malignant neoplasm metastatic to right lung    4. Cancer related pain    5. Abnormal results of thyroid function studies     6. Benign essential HTN    7. Coronary artery disease involving native coronary artery of native heart without angina pectoris    8. CKD (chronic kidney disease) stage 3, GFR 30-59 ml/min      1-3  - Long discussions with Mr. Cruz and his daughter/POA, Clemencia. We reviewed the PET scan images together and went over the molecular testing result. He has metastatic lung cancer which is not curable. Given that his tumor has a high expression of PD-L1, I recommend immunotherapy with Keytruda as front-line treatment. If he tolerates it well, we can add carboplatin as we know that carbo/alimta/keytruda provides survival benefit. He is not a candidate for alimta due to kidney function.   - Side effects of keytruda discussed with patient and POA, including but are not limited to, fatigue, weight loss, muscle aches, decreased appetite, rash, diarrhea, damage to lung, kidney, liver, bowels, thyroid. They understand and agree with proceeding with keytruda. Consent signed in the office. Handouts of carbo given to patient in case we add it in the future (handouts of keytruda provided on the last office visit).  - His daughter will be out of town next week and returning on 5/14. RTC on 5/14 to start cycle 1 of Keytruda  - send Guardant 360 as we do not have enough tissue  for ALK and ROS1    4.   - tramadol prn for pain    5.   - monitor TSH as patient will be receiving keytruda    6. BP on the lower end. Discontinue amlodipine    I spent 40 minutes with patient with at least 50% of the time on face-to-face counseling.     7. Doing well. C/w ASA and plavix.     8. Kidney function stable. Encourage oral hydration.     Electronically signed by Heather Manzo

## 2018-05-04 NOTE — TELEPHONE ENCOUNTER
Spoke with Peter (Pharmacist) at [facility] and he informed me that pt's Phenergan and Zofran both need a Prior Authorization (PA) and I can call [phone] for the PA. I voiced understanding.

## 2018-05-04 NOTE — TELEPHONE ENCOUNTER
Daughter stated pharmacy told her Phenergan and Zofran need Prior Authorization  (PA). Informed daughter I'll address the PA. Daughter inquired since pt's cancer has metastasized, is it worth doing tx? And if tx doesn't work, what is pt's life span after that? Informed daughter I'll let Dr. Manzo know, she voiced understanding.

## 2018-05-04 NOTE — TELEPHONE ENCOUNTER
Guardian 360 completed and dropped off at [facility] at [facility] on [address] at 1745. Package given to Hua (Manager at [facility]). The number scanned was [phone].

## 2018-05-04 NOTE — TELEPHONE ENCOUNTER
----- Message from Ellen George sent at 5/4/2018  9:43 AM CDT -----  Name of Who is Calling:Clemencia (Daughter)    What is the request in detail: Clemencia states she has more questions about what they discussed on yesterday and the patients medication. No further details given.      Can the clinic reply by [facility]:   No       What Number to Call Back if not in [facility]: [phone] or [phone] [place] [id number]

## 2018-05-08 NOTE — TELEPHONE ENCOUNTER
Spoke with Daiana   at [phone] regarding Prior Authorization for pt's Promethazine and Ondansetron. Daiana stated a decision regarding pt's meds should result in 24-72 hours and gave a Ref # [id number] for Promethazine and #[id number] for Ondansetron. I voiced understanding.

## 2018-05-10 NOTE — TELEPHONE ENCOUNTER
----- Message from Daniele Lynne sent at 5/10/2018  1:18 PM CDT -----  Contact: Clemencia Villanueva Daughter            Name of Who is Calling: Clemencia Pt Daughter      What is the request in detail: states she ask for the patient appointment be scheduled on Monday 05/14/18 due to her being off from work to assist him. Clemencia Pt Daughter states to reach her at her work number if she do not answer her cell      Can the clinic reply by [facility]: no      What Number to Call Back if not in [facility]: cell [phone] or work [phone] ext [phone]

## 2018-05-10 NOTE — TELEPHONE ENCOUNTER
Returned call and spoke with Ms Khanna, patient's daughter.Ms Khanna states due to her job, she is only available to bring Mr Cruz for his appointments on Mondays. She has request, if poss. Can all the appointments are rescheduled to Monday. Informed Ms Khanna, I will forward a message to our  and she will return her call to discuss further.

## 2018-05-15 NOTE — TELEPHONE ENCOUNTER
----- Message from Brandon Pardo sent at 5/15/2018  2:24 PM CDT -----  Contact: Clemencia, patient's daughter            Name of Who is Calling: Clemencia, patient's daughter      What is the request in detail: Patient's daughter returning phone call in regards to patient's medication      Can the clinic reply by [facility]: No      What Number to Call Back if not in [facility]: [phone] Ext. [phone]

## 2018-05-15 NOTE — TELEPHONE ENCOUNTER
Returned call and spoke with Mr Cruz daughter, Ms Khanna. Per Ms Khanna, [facility]'s pharmacy gave her father the wrong medication. Patient given Ritalin by error. Per Ms Clemencia, Mr Cruz took only 1 of the tablets before patient notice it was the wrong medication.   She wanted to make sure Dr Manzo was informed of above.

## 2018-05-15 NOTE — PROGRESS NOTES
Spoke with Clemencia. Mr Cruz got Ritalin from [facility] by mistake. He took one dose. Doing fine right now. Continues to have poor appetite. Spoke with Clemencia that we will try dronabinol 2.5 mg bid for cancer-related anorexia. She understands and agrees with the plan.

## 2018-05-15 NOTE — TELEPHONE ENCOUNTER
----- Message from Daniele Lynne sent at 5/15/2018  8:26 AM CDT -----  Contact: Clemencia Haddad (Pt Daughter)            Name of Who is Calling: Clemencia Haddad (Pt Daughter)      What is the request in detail: in regards to [facility] giving the patient the wrong medication      Can the clinic reply by [facility]: no      [facility]: [phone] [facility] [address]

## 2018-05-22 NOTE — PLAN OF CARE
Problem: Patient Care Overview  Goal: Plan of Care Review  Outcome: Ongoing (interventions implemented as appropriate)  Cycle 1 Keytruda administered, patient tolerated well. VSS, NAD. Education provided. D/C'd home with daughter.

## 2018-05-22 NOTE — PROGRESS NOTES
PROGRESS NOTE     Subjective:       Patient ID: Neville Cruz is a 83 y.o. male.     Chief Complaint: follow up for metastatic lung adenocarcinoma    Diagnosis:  1. 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  2. 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     GENOMICS:  Pathology of ligular mass biopsy on 4/12/18: PD-L1 55%, EGFR wild-type. Not sufficient tissue for ALK and ROS1  Guardant 360 test 5/3/2018: 34.6% JAK2 V617F mutation, 0.2% TP53 W91 mutation. Neg for ALK and ROS1     Oncologic History:  1. Mr. Cruz is an 83 yo AAM with history of esophageal cancer s/p radiation in 1997, CAD s/p PCI in 1999, 2010 and 2016, CKD stage IV, who saw me on 4/19/18 for further management of newly diagnosed lung cancer. Smoked 2 PPD before but stopped in 1997 when his esophageal cancer was diagnosed. He initially presented to ED on 3/12/18 with abdominal cramping and diarrhea. CT A/P without contrast on 3/13/18 showed wall thickening and extending from the splenic flexure to the rectum. Innumerable pulmonary nodules and lytic lesions in the lumbosacral spine c/w metastatic disease. CT chest without contrast on 3/15/18 showed innumerable diffuse pulmonary nodules c/w metastatic disease. 8.8 cm opacity in the left upper lobe, highly suspicious for primary lung malignancy. There is also peribronchial and septal thickening in the ESTRELLITA suspicious for lympangitic carcinomatosis and trace pleural fluid is seen. Multiple lucent and sclerotic bone lesions c/w metastatic disease, with probably pathologic fracture of the left lateral 6th rib. He was on ASA and Plavix, therefore his procedures were scheduled as outpatient. Colonoscopy on 3/23/18 showed hemorrhoids and diverticulosis, but no masses. IR biopsy of the lingular mass was performed on 4/12/18. Result showed mucinous adenocarcinoma. Molecular testing  was negative for EGFR, PD-L1 55%. Not enough tissue to test for ALK and ROS1.   2. PET scan on 18 showed a primary lung mass left upper lobe invading the pre-vascular anterior mediastinum.  SUV max 7.34.  There are diffuse bone metastases seen throughout the entire central axial red marrow skeleton.  For index purposes medial left iliac bone SUV max 8.17.  There are diffuse lung metastases bilaterally.  Right posterior pleural based SUV max 4.02.  There is a left pleural effusion.  There is a small pericardial effusion.  MRI brain without contrast showed no overt intracranial metastases.   3. Keytruda started on 18.   4. Cannot give Xgeva due to dental problems. He has teeth gotten loose and falling out, but unable to have dental work done due to previous radiation.      INTERVAL HISTORY:   Mr. Cruz returns today accompanied by his daughter, Clemencia, for follow up of metastatic lung adenocarcinoma. Since last seen, Guardant 360 test came back and showed 34.6% JAK2 V617F mutation, 0.2% TP53 W91 mutation. Neg for ALK and ROS1. He continues to feel tired. Started taking marinol 2.5 mg bid which helped his appetite but not significantly. Still has back pain. Tramadol helps.     ECO     ROS:   See interval history, otherwise negative.     Physical Examination:   Vital signs reviewed.   Gen: well hydrated, well developed, in no acute distress.  HEENT: normocephalic, anicteric sclerae, PERRLA, EOMI, oropharynx clear  Neck: supple, no JVD, thyromegaly, cervical or supraclavicular LAD  Lungs: CTAB, no wheezes or rales  Heart: RRR, no M/R/G  Abdomen: soft, no tenderness, non-distended, no hepatosplenomegaly, mass, or hernia. BS present  Ext: no clubbing, cyanosis, or edema  Neuro: alert and oriented x 4, no focal neuro deficit  Skin: no rash, erythema, open wound or ulcers  Psych: pleasant and appropriate mood and affect     Objective:      Diagnostic Tests:  PET scan :  There is a primary lung mass left  upper lobe invading the pre-vascular anterior mediastinum.  SUV max 7.34.  There are diffuse bone metastases seen throughout the entire central axial red marrow skeleton.  For index purposes medial left iliac bone SUV max 8.17.  There are diffuse lung metastases bilaterally.  Right posterior pleural based SUV max 4.02.  There is a left pleural effusion.  There is a small pericardial effusion.     Laboratory Data:  Labs reviewed. Cr 1.8     Assessment/Plan:      1. Adenocarcinoma of lung, stage 4, left    2. Multiple lesions of metastatic malignancy in lumbosacral spine    3. Malignant neoplasm metastatic to right lung    4. Encounter for antineoplastic immunotherapy    5. Anorexia    6. Cancer related pain    7. Essential thrombocythemia    8. Coronary artery disease involving native coronary artery of native heart without angina pectoris    9. Benign essential HTN    10. CKD (chronic kidney disease) stage 3, GFR 30-59 ml/min    11. Anorexia         1-4  - Again discussed with Mr. Cruz and his POA, Clemencia, re the side effects of Keytruda. Consent was signed during the last office visit.   - start keytruda cycle 1 today  - cannot give Xgeva due to dental problems. Unable to get dental work done due to previous radiation.     5. 11  - increase marinol to 5 mg bid     6.  - c/w tramadol prn    7.  - Discussed with patient and POA re the diagnosis of ET. C/w ASA. Will not start Hydrea right now since he is starting Keytruda. May start hydrea if he does well with Keytruda    8.  - c/w ASA and plavix    9.  - BP good. C/w current meds    10.  - Cr 1.8 slightly higher than baseline. IVF       RTC in 3 weeks with CBC, CMP, TSH

## 2018-05-23 NOTE — TELEPHONE ENCOUNTER
----- Message from Brandon Pardo sent at 5/23/2018 12:43 PM CDT -----  Contact: Clemencia, patient's daughter            Name of Who is Calling: Clemencia, patient's daughter      What is the request in detail: Patient called in regards to his medication. Patient is having dizzy spells from an increase in medication of dronabinol (MARINOL) 2.5 MG capsule      Can the clinic reply by [facility]: No      What Number to Call Back if not in [facility]: [phone]

## 2018-05-23 NOTE — TELEPHONE ENCOUNTER
Spoke with Clemencia (pt's daughter) and informed her that Dr. Manzo stated to reduce pt's Marinol to 2.5 mg po twice daily. Also informed Clemencia to make sure pt drinks a lot of fluids, to drink 64 oz of fluid per day. Clemencia voiced understanding.

## 2018-05-23 NOTE — TELEPHONE ENCOUNTER
Returned Mrs. Haddad's call, but she did not answer. Received message she was calling to reschedule her father's appointment. GI appointment cancelled and rescheduled, voicemail left per Mrs. Haddad's request.

## 2018-05-23 NOTE — TELEPHONE ENCOUNTER
----- Message from Clemencia Hicks MA sent at 5/23/2018  8:51 AM CDT -----  Contact: Clemencia Escobar  daughter  [phone]  leave message she is at work  States she needs to reschedule the appointment on 5-25-18.

## 2018-05-23 NOTE — TELEPHONE ENCOUNTER
Clemencia (daughter) stated pt is c/o dizziness due to his Marinol. Pt's Marinol was increased from 2.5mg orally bid to 5 mg orally bid. Informed daughter dizziness is a side effect of  Marinol and as dose increases, chances of experiencing side effects can also increase. Clemencia voiced understanding and stated pt did not experience this side effect when he took 2.5 mg po bid. Daughter wondering if Dr. Manzo can reduce the dose. I informed Clemencia I'll let Dr. Manzo know and call her back with follow up information. Clemencia voiced understanding.

## 2018-05-28 PROBLEM — E86.0 DEHYDRATION: Status: ACTIVE | Noted: 2018-01-01

## 2018-05-28 NOTE — ED NOTES
Pt lying in bed, ivf bolus complete, pt denies any needs, educated about need for urine specimen, call bell in reach, family at bedside

## 2018-05-28 NOTE — ED NOTES
"Assumed care of pt, received report from CONSTANTINO Mares. Pt semi fowlers in stretcher, AAOx4, GCS 15, calm, cooperative, responding appropriately to questions. Respirations even and unlabored, NAD noted. Pt states "I'm feeling a lot better. I'm not feeling dizzy anymore". Pt has urinal at bedside and states he is going to try and use it. Pt instructed to call when urine available to collect sample. Pt verbalized understanding. Family member at bedside. Pt is connected to cardiac monitor, continuous pulse ox, BP cycling. Bed locked and in lowest position, side rails x 2, call light in reach. WCTM.  "

## 2018-05-28 NOTE — TELEPHONE ENCOUNTER
Spoke with Clemencia regarding pt's blood pressure. Clemencia stated pt had a blood pressure of 88/66 so she called the Ambulance. Clemencia stated the EMT stated pt was positive for Orthostatic hypotension so they are taking pt to ER at [facility]. Informed Clemencia that I will let Dr. Manzo know. Clemencia voiced understanding. I also asked Clemencia to call Dr. Manzo's office back to let us know if pt will be admitted. Clemencia voiced understanding.

## 2018-05-28 NOTE — TELEPHONE ENCOUNTER
----- Message from Adriana Avila sent at 5/28/2018 10:15 AM CDT -----  Contact: daughter            Name of Who is Calling: OMID RODGERS [[id number]]      What is the request in detail: pt pressure is really low.. pt's daughter would like to speak with staff in regards to what she should do.. Please advise      Can the clinic reply by [facility]: no      What [facility] Back [facility]: [phone]

## 2018-05-28 NOTE — ED PROVIDER NOTES
"Encounter Date: 5/28/2018    SCRIBE #1 NOTE: I, Jm Shaw, am scribing for, and in the presence of, Dr. Muñoz.       History     Chief Complaint   Patient presents with    Dizziness     Pt reports he woke up this morning and was dizzy, had a low BP of 84/48 , pt reports poor appetite and currently receiving chemo for lung cancer.       Seen by provider: 11:45 AM    Patient is a 83 y.o. male with a history of HTN, CAD, CKD, and lung cancer currently receiving chemo therapy who presents to the ED with complaint of dizziness, which began this morning. He reports waking up this morning with dizziness and generalized weakness. He reports his blood pressure was low when he checked at "88/66." He reports vomiting once this morning after he drank "salt water." He also notes a decreased appetite recently. He denies fevers, diarrhea, abdominal pain, rashes or urinary symptoms. He reports feeling well last night prior to going to sleep. He states "my pressure goes up and down" and notes he was recently taken off a blood pressure medication. He reports being diagnosed with lung cancer about three months ago, and states his first chemo therapy treatment was 6 days ago. Dr. Manzo is his oncologist. He reports a past history of tobacco use.       The history is provided by the patient and a relative (daughter).     Review of patient's allergies indicates:  No Known Allergies  Past Medical History:   Diagnosis Date    Age-related osteoporosis without current pathological fracture 6/6/2017    Benign prostatic hyperplasia 05/10/2017    Cataract     CKD (chronic kidney disease) stage 3, GFR 30-59 ml/min 5/12/2017    Coronary artery disease involving native coronary artery of native heart without angina pectoris 05/10/2017    cards/Dr. Melva Reilly    Esophageal cancer 1997    s/p surgery, XRT    H/O heart artery stent     LAD 9/22/16 Zotarolimus eluting coronary stent system- [employer]    Hypertension     " Primary osteoarthritis of right knee 05/10/2017    sees ortho, knee replacement recommended     Past Surgical History:   Procedure Laterality Date    CATARACT EXTRACTION Right     july 2017    COLONOSCOPY N/A 3/23/2018    Procedure: COLONOSCOPY;  Surgeon: Herrera Sanchez MD;  Location: [facility] ([address] [place] [address]);  Service: Endoscopy;  Laterality: N/A;  Plavix/OK to hold 5 days per Dr VENUS Morgan/egd order cx/had in hospital recently/svn    CORONARY ANGIOPLASTY WITH STENT PLACEMENT      LCx 1999, mid RCA 2010, LAD 9/2016; cards/Dr. Melva Reilly    THROAT SURGERY      2/2 throat cancer     Family History   Problem Relation Age of Onset    Cancer Mother         stomach    Hypertension Mother     Hypertension Daughter     Hypertension Son     Hypertension Daughter     Blindness Neg Hx     Amblyopia Neg Hx     Cataracts Neg Hx     Glaucoma Neg Hx     Macular degeneration Neg Hx     Retinal detachment Neg Hx     Strabismus Neg Hx     Diabetes Neg Hx     Stroke Neg Hx     Thyroid disease Neg Hx     Colon cancer Neg Hx     Colon polyps Neg Hx     Esophageal cancer Neg Hx     Irritable bowel syndrome Neg Hx     Inflammatory bowel disease Neg Hx     Stomach cancer Neg Hx      Social History   Substance Use Topics    Smoking status: Former Smoker     Years: 50.00     Types: Cigarettes    Smokeless tobacco: Never Used    Alcohol use No     Review of Systems   Constitutional: Positive for appetite change (decreased). Negative for fever.   HENT: Negative for sore throat.    Respiratory: Negative for shortness of breath.    Cardiovascular: Negative for chest pain.   Gastrointestinal: Positive for vomiting (one episode, after drinking salt water). Negative for abdominal pain, diarrhea and nausea.   Genitourinary: Negative for dysuria.   Musculoskeletal: Negative for back pain.   Skin: Negative for rash.   Neurological: Positive for dizziness and weakness (generalized).   Hematological: Does not bruise/bleed  easily.       Physical Exam     Initial Vitals [05/28/18 1124]   BP Pulse Resp Temp SpO2   (!) 153/69 85 18 97.5 °F (36.4 °C) 98 %      MAP       97         Physical Exam    Nursing note and vitals reviewed.  Constitutional: He appears well-developed and well-nourished. No distress.   HENT:   Head: Normocephalic and atraumatic.   Mouth/Throat: Oropharynx is clear and moist.   Eyes: Conjunctivae and EOM are normal. Pupils are equal, round, and reactive to light.   Neck: Normal range of motion. Neck supple.   Cardiovascular: Normal rate, regular rhythm and normal heart sounds. Exam reveals no gallop and no friction rub.    No murmur heard.  Pulmonary/Chest: Breath sounds normal. No respiratory distress. He has no wheezes. He has no rhonchi. He has no rales.   Abdominal: Soft. He exhibits no distension. There is no tenderness. There is no rebound and no guarding.   Musculoskeletal: Normal range of motion. He exhibits no edema.   Neurological: He is alert and oriented to person, place, and time. He has normal strength. No cranial nerve deficit or sensory deficit.   Skin: Skin is warm and dry. No rash noted.   Psychiatric: He has a normal mood and affect. His behavior is normal.         ED Course   Procedures  Labs Reviewed   CBC W/ AUTO DIFFERENTIAL - Abnormal; Notable for the following:        Result Value    WBC 12.76 (*)     RBC 4.39 (*)     Hemoglobin 10.7 (*)     Hematocrit 35.1 (*)     MCV 80 (*)     MCH 24.4 (*)     MCHC 30.5 (*)     RDW 15.7 (*)     Platelets 487 (*)     Gran # (ANC) 10.7 (*)     Gran% 83.6 (*)     Lymph% 7.6 (*)     All other components within normal limits   COMPREHENSIVE METABOLIC PANEL - Abnormal; Notable for the following:     CO2 20 (*)     Albumin 3.1 (*)     eGFR if  54 (*)     eGFR if non  46 (*)     All other components within normal limits   URINALYSIS   LACTIC ACID, PLASMA     Imaging Results          X-Ray Chest AP Portable (Final result)  Result time  05/28/18 12:55:46    Final result by Jeremiah Flores MD (05/28/18 12:55:46)                 Impression:      Left suprahilar mass is similar to or slightly increased in size when compared to 04/12/2018.    Numerous bilateral pulmonary nodules are again identified consistent with diffuse bilateral pulmonary metastatic disease.      Electronically signed by: Jeremiah Flores MD  Date:    05/28/2018  Time:    12:55             Narrative:    EXAMINATION:  XR CHEST AP PORTABLE    CLINICAL HISTORY:  Weakness    TECHNIQUE:  Single frontal view of the chest was performed.    COMPARISON:  04/12/2018.    FINDINGS:  The heart is not enlarged.  The left suprahilar mass is again identified and this appears more prominent than on the prior examination although this may be due to a slight change in the patient positioning and magnification.  There are numerous small pulmonary nodules identified bilaterally consistent with patient's known diffuse pulmonary metastatic disease.  There is no evidence for pneumothorax or large pleural effusions.  Bony structures are grossly intact.                                EKG Readings: (Independently Interpreted)   Normal sinus rhythm at rate of 75. Normal intervals. Normal QRS. No acute ST or T wave abnormalities. Unchanged when compared to EKG from 3/12/18.       X-Rays:   Independently Interpreted Readings:   Chest X-Ray: pulmonary metastatic disease. No acute findings.     Medical Decision Making:   Clinical Tests:   Lab Tests: Ordered and Reviewed  Medical Tests: Reviewed and Ordered  ED Management:  83-year-old male with dizziness.  Currently getting infusions for his lung cancer.  The differential would include dehydration, infection, electrolyte abnormality.  Will get labs, give IV fluids and re-evaluate.    1:15 p.m. upon re-evaluation patient is feeling much better.  His labs were reviewed showing no acute abnormality.  Chest x-ray is negative for infection.  Still pending urinalysis.   We will consult his oncologist, Dr. Manzo for update prior to discharge.    1:50 PM - Consulted and discussed the case with Dr. Manzo.    2:20 p.m. on re-evaluation and discussed discharge planning with the patient as well as his daughter was at bedside.  They are to follow up with Dr. cornell on the Thursday.  Return precautions given.    Patient discharged home in stable condition. Diagnosis and treatment plan explained to patient. I have answered all questions and the patient is satisfied with the plan of care. This is the extent to the patients complaints today here in the emergency department.            Scribe Attestation:   Scribe #1: I performed the above scribed service and the documentation accurately describes the services I performed. I attest to the accuracy of the note.    Attending Attestation:           Physician Attestation for Scribe:  Physician Attestation Statement for Scribe #1: I, Dr. Muñoz, reviewed documentation, as scribed by Jm Shaw in my presence, and it is both accurate and complete.                    Clinical Impression:     1. Weakness                              Hernandez Muñoz,   05/28/18 2375

## 2018-05-28 NOTE — ED NOTES
Completed hourly rounding on pt. Pt lying semi fowlers in bed, AAOx4, GCS 15, calm, cooperative, responding appropriately to questions, respirations even and unlabored with equal bilateral chest expansion, NAD noted. Addressed pt needs. Pt denies need to use the bathroom at this time. Pt and family member at bedside updated on POC. Pt remains connected to cardiac monitor, continuous pulse ox, BP cycling. Bed locked and in low position, side rails x 2, call light in reach. WCTM.

## 2018-05-29 NOTE — TELEPHONE ENCOUNTER
----- Message from Daniele Lynne sent at 5/29/2018  1:01 PM CDT -----  Contact: pt daughter            Name of Who is Calling: pt daughter      What is the request in detail: is stating the patient has been doubled scheduled and is needing it corrected      Can the clinic reply by [facility]: pt      What Number [facility]: [phone] ext [phone] or [phone]

## 2018-05-29 NOTE — TELEPHONE ENCOUNTER
Spoke with Clemencia (daughter) and informed her that Dr. Manzo is currently out of town until next Wednesday. Clemencia voiced understanding. Informed Clemencia that Dr. Manzo just wants pt to come in for additional IV fluids, since pt went to hospital for orthostatic hypotension and was very dehydrated. I informed Clemencia Manzo wanted to help prevent dehydration from occurring again. Clemencia voiced understanding and stated her  will drop the pt off. Informed Clemencia Infusion appt is scheduled for Thursday May 31, 2018 at 230 pm at [facility], Clemencia voiced understanding.

## 2018-05-31 NOTE — PLAN OF CARE
Problem: Patient Care Overview  Goal: Plan of Care Review  Outcome: Ongoing (interventions implemented as appropriate)  IVF administered, patient tolerated well. VSS, NAD. D/C'd home with self.

## 2018-06-01 NOTE — TELEPHONE ENCOUNTER
Patient's daughter calling to request Omeprazole be sent to [facility] instead of [facility]. Cancelled prescription at [facility].

## 2018-06-04 NOTE — TELEPHONE ENCOUNTER
Spoke with Clemencia (daughter) regarding pt's c/o n/v. Pt has vomited twice on Sunday and Clemencia does not know if pt has vomited today. Clemencia states pt also c/o headache and elevated blood pressure since Saturday. Clemencia stated pt's vomit was yellow. Clmeencia stated last time pt went to the ER, the ER doctor informed pt to stop taking his blood pressure medicine due to low blood pressure. Clemencia states pt had a systolic blood pressure of 166 but did not know the diastolic blood pressure. Pt also had a systolic blood pressure of 150 on Sunday but Clemencia did not know what the diastolic blood pressure was. Informed Clemencia that the c/o headache and elevated blood pressure maybe due to pt's vomiting or visa versa. Informed Clemencia pt needs to continue to take nausea medicine. Also informed Clemencia to continue to monitor pt's blood pressure. Stated if pt's top blood pressure number or systolic is >190 and or bottom blood pressure number or diastolic blood pressure  is > 100, pt needs to go to ER or Urgent Care. Clemencia voiced understanding. Clemencia asked me if the pt needs to resume his blood pressure medicine, I informed Clemencia that I cannot give the pt those orders because it is outside my scope of nursing. I stated only the physician can tell the pt that and that is why I encourage pt to go to the ER or Urgent Care for the doctor to assess pt if his blood pressure gets too elevated. Also encouraged Clemencia to help pt stay hydrated. Encourage pt to drink plenty of fluids like Water, Gatorade, etc...). I also stated if pt gets dehydrated to take pt to ER to get fluids. Clemencia voiced understanding and stated, she will continue to monitor patient's blood pressure and keep pt hydrated with fluids. Clemencia also stated she will speak to Dr. Manzo about pt's blood pressure medication on Wednesday. I voiced understanding.

## 2018-06-04 NOTE — TELEPHONE ENCOUNTER
----- Message from Adriana Avila sent at 6/4/2018  9:26 AM CDT -----  Contact: pt's daughter             Name of Who is Calling: OMID RODGERS [[id number]]      What is the request in detail: pt's daughter calling in regards to dads pressure being up and throwing up... Please advise      Can the clinic reply by [facility]: no      What Number to Call Back if not in [facility]: [phone] ext [phone] or [phone]

## 2018-06-11 NOTE — PROGRESS NOTES
Subjective:       Patient ID: Neville Cruz is a 83 y.o. male.     Chief Complaint: follow up for metastatic lung adenocarcinoma     Diagnosis:  1. 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  2. 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      GENOMICS:  Pathology of ligular mass biopsy on 4/12/18: PD-L1 55%, EGFR wild-type. Not sufficient tissue for ALK and ROS1  Guardant 360 test 5/3/2018: 34.6% JAK2 V617F mutation, 0.2% TP53 W91 mutation. Neg for ALK and ROS1     Oncologic History:  1. Mr. Cruz is an 81 yo AAM with history of esophageal cancer s/p radiation in 1997, CAD s/p PCI in 1999, 2010 and 2016, CKD stage IV, who saw me on 4/19/18 for further management of newly diagnosed lung cancer. Smoked 2 PPD before but stopped in 1997 when his esophageal cancer was diagnosed. He initially presented to ED on 3/12/18 with abdominal cramping and diarrhea. CT A/P without contrast on 3/13/18 showed wall thickening and extending from the splenic flexure to the rectum. Innumerable pulmonary nodules and lytic lesions in the lumbosacral spine c/w metastatic disease. CT chest without contrast on 3/15/18 showed innumerable diffuse pulmonary nodules c/w metastatic disease. 8.8 cm opacity in the left upper lobe, highly suspicious for primary lung malignancy. There is also peribronchial and septal thickening in the ESTRELLITA suspicious for lympangitic carcinomatosis and trace pleural fluid is seen. Multiple lucent and sclerotic bone lesions c/w metastatic disease, with probably pathologic fracture of the left lateral 6th rib. He was on ASA and Plavix, therefore his procedures were scheduled as outpatient. Colonoscopy on 3/23/18 showed hemorrhoids and diverticulosis, but no masses. IR biopsy of the lingular mass was performed on 4/12/18. Result showed mucinous adenocarcinoma. Molecular testing was negative for  EGFR, PD-L1 55%. Not enough tissue to test for ALK and ROS1.   2. PET scan on 18 showed a primary lung mass left upper lobe invading the pre-vascular anterior mediastinum.  SUV max 7.34.  There are diffuse bone metastases seen throughout the entire central axial red marrow skeleton.  For index purposes medial left iliac bone SUV max 8.17.  There are diffuse lung metastases bilaterally.  Right posterior pleural based SUV max 4.02.  There is a left pleural effusion.  There is a small pericardial effusion.  MRI brain without contrast showed no overt intracranial metastases.   3. Keytruda started on 18. Was in the ED for dizziness from low BP. Improved after IVF.   4. Cannot give Xgeva due to dental problems. He has teeth gotten loose and falling out, but unable to have dental work done due to previous radiation.      INTERVAL HISTORY:   Mr. Cruz returns today accompanied by his daughter, Clemencia, for follow up of metastatic lung adenocarcinoma. He was in the ED after last Keytruda for dizziness from low BP. Improved after IVF. He feels good now. Usually feels tired 2 days after Keytruda, which resolved a couple of days later. Amlodipine and metoprolol was stopped. Only taking benazapril 20 mg daily for BP. SBP ranging between 100 and 150s at home. Check BP twice a day. Feels fine otherwise. Has headaches when lying down, also has sinus congestion.     ECO     ROS:   See interval history, otherwise negative.     Physical Examination:   Vital signs reviewed.   Gen: well hydrated, well developed, in no acute distress.  HEENT: normocephalic, anicteric sclerae, PERRLA, EOMI, oropharynx clear  Neck: supple, no JVD, thyromegaly, cervical or supraclavicular LAD  Lungs: CTAB, no wheezes or rales  Heart: RRR, no M/R/G  Abdomen: soft, no tenderness, non-distended, no hepatosplenomegaly, mass, or hernia. BS present  Ext: no clubbing, cyanosis, or edema  Neuro: alert and oriented x 4, no focal neuro deficit  Skin:  no rash, erythema, open wound or ulcers  Psych: pleasant and appropriate mood and affect     Objective:      Diagnostic Tests:  PET scan 4/27:  There is a primary lung mass left upper lobe invading the pre-vascular anterior mediastinum.  SUV max 7.34.  There are diffuse bone metastases seen throughout the entire central axial red marrow skeleton.  For index purposes medial left iliac bone SUV max 8.17.  There are diffuse lung metastases bilaterally.  Right posterior pleural based SUV max 4.02.  There is a left pleural effusion.  There is a small pericardial effusion.     Laboratory Data:  Labs reviewed. Cr 1.5     Assessment/Plan:      1. Adenocarcinoma of lung, stage 4, left    2. Multiple lesions of metastatic malignancy in lumbosacral spine    3. Encounter for antineoplastic immunotherapy    4. Myeloproliferative neoplasm    5. Essential thrombocythemia    6. Essential hypertension    7. Cancer related pain    8. Coronary artery disease involving native coronary artery of native heart without angina pectoris    9. Sinus congestion    10. Insomnia, unspecified type    11. Dyspepsia    12. Gastroesophageal reflux disease, esophagitis presence not specified    13. CKD (chronic kidney disease) stage 3, GFR 30-59 ml/min    14. Abnormal results of thyroid function studies     15. History of throat cancer    16. Iron deficiency anemia, unspecified iron deficiency anemia type        1-3  - Doing well. Keytruda cycle 2 today  - PET scan in 3 weeks prior to return. C/w keytruda in the absence of progression  - IVF this Thursday and then next Monday  - cannot give Xgeva due to dental problems. Unable to get dental work done due to previous radiation.      4.5  - JAK2 positive  - aspirin and plavix for now. No Hydrea for now as it may worsen anemia     6.  - ask patient to check BP twice a day. Hold benazepril if SBP<110     7.  - Discussed with patient and POA re the diagnosis of ET. C/w ASA. Will not start Hydrea right now  since he is starting Keytruda. May start hydrea if he does well with Keytruda     8.  - c/w ASA and plavix     9.  - claritin     10.  - refilled ambien    11.12  - prilosec 40 mg daily    13.  - stable. Monitor    15.  - LANI    16.  - Ferrous sulfate bid along with stool softeners  - if cannot tolerate oral iron will give IV injectafer    F/U:  - PET scan in 3 weeks  - RTC in 3 weeks after PET scan

## 2018-06-12 PROBLEM — D47.1 MYELOPROLIFERATIVE NEOPLASM: Status: ACTIVE | Noted: 2018-01-01

## 2018-06-12 PROBLEM — D47.3 ESSENTIAL THROMBOCYTHEMIA: Status: ACTIVE | Noted: 2018-01-01

## 2018-06-12 PROBLEM — Z51.12 ENCOUNTER FOR ANTINEOPLASTIC IMMUNOTHERAPY: Status: ACTIVE | Noted: 2018-01-01

## 2018-06-12 NOTE — Clinical Note
Return for IVF this Thursday 6/14 and next Monday 6/18 PET scan on 7/2, CBC, CMP, TSH RTC on 7/3, see me, then keytruda

## 2018-06-12 NOTE — PLAN OF CARE
Problem: Patient Care Overview  Goal: Plan of Care Review  Outcome: Ongoing (interventions implemented as appropriate)  Pt received Keytruda infusion today. Pt tolerated well, no signs of reaction. VSS. AVS given.

## 2018-06-14 NOTE — PLAN OF CARE
Problem: Patient Care Overview  Goal: Plan of Care Review  Outcome: Ongoing (interventions implemented as appropriate)  Pt received IVFs today. Pt tolerated well. VSS. AVS given.

## 2018-06-18 NOTE — PLAN OF CARE
Problem: Patient Care Overview (Adult)  Goal: Plan of Care Review  Patient tolerated infusion well. AVS provided and discharged to home.

## 2018-06-21 NOTE — TELEPHONE ENCOUNTER
Returned Clemencia's call, but she did not answer. Left voicemail for her to return a call to our clinic.

## 2018-06-21 NOTE — TELEPHONE ENCOUNTER
----- Message from Senait Navas sent at 6/21/2018 10:29 AM CDT -----  Patient Requesting Sooner Appointment.     Reason for sooner appt.: Pt's daughter would like to reschedule pt's follow up visit too 07/19/18, early morning if possible. Pt's daughter is requesting and appt on a Thursday morning.   When is the first available appointment? 09/07/18  Communication Preference: Pt's daughter Clemencia Haddad [phone]  Additional Information:  Clemencia womack sshe is at work and asked that a msg be left on her vm

## 2018-07-03 NOTE — Clinical Note
Return for IVF this Thursday 7/5 and next Mon 7/9 RTC in 3 weeks with CBC, CMP, TSH, see me, then chemo (keytruda)

## 2018-07-03 NOTE — PROGRESS NOTES
Subjective:       Patient ID: Neville Cruz is a 83 y.o. male.     Chief Complaint: follow up for metastatic lung adenocarcinoma     Diagnosis:  1. 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  2. 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      GENOMICS:  Pathology of ligular mass biopsy on 4/12/18: PD-L1 55%, EGFR wild-type. Not sufficient tissue for ALK and ROS1  Guardant 360 test 5/3/2018: 34.6% JAK2 V617F mutation, 0.2% TP53 W91 mutation. Neg for ALK and ROS1     Oncologic History:  1. Mr. Cruz is an 83 yo AAM with history of esophageal cancer s/p radiation in 1997, CAD s/p PCI in 1999, 2010 and 2016, CKD stage IV, who saw me on 4/19/18 for further management of newly diagnosed lung cancer. Smoked 2 PPD before but stopped in 1997 when his esophageal cancer was diagnosed. He initially presented to ED on 3/12/18 with abdominal cramping and diarrhea. CT A/P without contrast on 3/13/18 showed wall thickening and extending from the splenic flexure to the rectum. Innumerable pulmonary nodules and lytic lesions in the lumbosacral spine c/w metastatic disease. CT chest without contrast on 3/15/18 showed innumerable diffuse pulmonary nodules c/w metastatic disease. 8.8 cm opacity in the left upper lobe, highly suspicious for primary lung malignancy. There is also peribronchial and septal thickening in the ESTRELLITA suspicious for lympangitic carcinomatosis and trace pleural fluid is seen. Multiple lucent and sclerotic bone lesions c/w metastatic disease, with probably pathologic fracture of the left lateral 6th rib. He was on ASA and Plavix, therefore his procedures were scheduled as outpatient. Colonoscopy on 3/23/18 showed hemorrhoids and diverticulosis, but no masses. IR biopsy of the lingular mass was performed on 4/12/18. Result showed mucinous adenocarcinoma. Molecular testing was negative for  EGFR, PD-L1 55%. Not enough tissue to test for ALK and ROS1.   2. PET scan on 18 showed a primary lung mass left upper lobe invading the pre-vascular anterior mediastinum.  SUV max 7.34.  There are diffuse bone metastases seen throughout the entire central axial red marrow skeleton.  For index purposes medial left iliac bone SUV max 8.17.  There are diffuse lung metastases bilaterally.  Right posterior pleural based SUV max 4.02.  There is a left pleural effusion.  There is a small pericardial effusion.  MRI brain without contrast showed no overt intracranial metastases.   3. Keytruda started on 18. Was in the ED for dizziness from low BP. Improved after IVF. PET scan on 18 showed interim response.   4. Cannot give Xgeva due to dental problems. He has teeth gotten loose and falling out, but unable to have dental work done due to previous radiation.      INTERVAL HISTORY:   Mr. Cruz returns today accompanied by his granddaughter for continued treatment. PET scan yesterday showed interim response. He feels good. Energy level improved after started on keytruda. +Right should arthritic pain. Denies other complaints. No diarrhea     ECO     ROS:   See interval history, otherwise negative.     Physical Examination:   Vital signs reviewed.   Gen: well hydrated, well developed, in no acute distress.  HEENT: normocephalic, anicteric sclerae, PERRLA, EOMI, oropharynx clear  Neck: supple, no JVD, thyromegaly, cervical or supraclavicular LAD  Lungs: CTAB, no wheezes or rales  Heart: RRR, no M/R/G  Abdomen: soft, no tenderness, non-distended, no hepatosplenomegaly, mass, or hernia. BS present  Ext: no clubbing, cyanosis, or edema  Neuro: alert and oriented x 4, no focal neuro deficit  Skin: no rash, erythema, open wound or ulcers  Psych: pleasant and appropriate mood and affect     Objective:      Diagnostic Tests:  PET scan 18:  There has been some but not complete response to therapy.    Index primary  SUV max 4.22.    Index bone medial left ilium index SUV max 7.17.    Index right pleural based lung nodule SUV max 4.02.     Laboratory Data:  Labs reviewed. Cr 1.5 stable. Liver enzymes good.      Assessment/Plan:      1. Adenocarcinoma of lung, stage 4, left    2. Metastasis to bone    3. Malignant neoplasm metastatic to right lung    4. Encounter for antineoplastic immunotherapy    5. Essential thrombocythemia    6. Abnormal results of thyroid function studies     7. CKD (chronic kidney disease) stage 3, GFR 30-59 ml/min    8. Benign essential HTN    9. Coronary artery disease involving native coronary artery of native heart without angina pectoris    10. History of throat cancer           1-3  - Doing well. PET scan yesterday showed interim response. Discussed with patient re the results. He is feeing better since started on keytruda  - Keytruda cycle 3 today, return on Thursday and next Monday for IVF  - cannot give Xgeva due to dental problems. Unable to get dental work done due to previous radiation.      5  - JAK2 positive  - aspirin and plavix for now. No Hydrea for now as it may worsen anemia     7.  - stable. IVF today, Thursday and next Monday     8.  - BP good. C/w current meds     9.  - no symptoms. C/w aspirin and plavix    10.  - LANI

## 2018-07-03 NOTE — TELEPHONE ENCOUNTER
Called Mrs Khanna, Mr Cruz's daughter. No answer at cell number. Message left. Mr Cruz N/S his scheduled lab appointment on 7/2/18. Ct scan completed, however labs were not drawn. Rescheduled labs appt to today @ 10:30am. All appointment information left on Mrs Khanna voice mail.

## 2018-07-03 NOTE — PLAN OF CARE
Problem: Patient Care Overview (Adult)  Goal: Plan of Care Review  Outcome: Ongoing (interventions implemented as appropriate)  Keytruda administered, patient tolerated well. VSS, NAD. D/C'd home with family.

## 2018-07-05 NOTE — PLAN OF CARE
Problem: Patient Care Overview (Adult)  Goal: Plan of Care Review  Outcome: Ongoing (interventions implemented as appropriate)  Pt tolerated IVF today. VSS. AVS given.

## 2018-07-13 NOTE — TELEPHONE ENCOUNTER
Notified patient's daughter refill was sent. Patient's daughter states they do not use [facility] anymore but [facility]. Cancelled Flomax at [facility].Took [facility] out of patient's chart.

## 2018-07-24 NOTE — PLAN OF CARE
Problem: Patient Care Overview (Adult)  Goal: Plan of Care Review  Outcome: Ongoing (interventions implemented as appropriate)  Pt tolerated Keytruda infusion today. No signs of reaction. VSS. AVS given.

## 2018-07-24 NOTE — PROGRESS NOTES
Subjective:       Patient ID: Neville Cruz is a 83 y.o. male.     Chief Complaint: follow up for metastatic lung adenocarcinoma     Diagnosis:  1. 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  2. 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      GENOMICS:  Pathology of ligular mass biopsy on 4/12/18: PD-L1 55%, EGFR wild-type. Not sufficient tissue for ALK and ROS1  Guardant 360 test 5/3/2018: 34.6% JAK2 V617F mutation, 0.2% TP53 W91 mutation. Neg for ALK and ROS1     Oncologic History:  1. Mr. Cruz is an 81 yo AAM with history of esophageal cancer s/p radiation in 1997, CAD s/p PCI in 1999, 2010 and 2016, CKD stage IV, who saw me on 4/19/18 for further management of newly diagnosed lung cancer. Smoked 2 PPD before but stopped in 1997 when his esophageal cancer was diagnosed. He initially presented to ED on 3/12/18 with abdominal cramping and diarrhea. CT A/P without contrast on 3/13/18 showed wall thickening and extending from the splenic flexure to the rectum. Innumerable pulmonary nodules and lytic lesions in the lumbosacral spine c/w metastatic disease. CT chest without contrast on 3/15/18 showed innumerable diffuse pulmonary nodules c/w metastatic disease. 8.8 cm opacity in the left upper lobe, highly suspicious for primary lung malignancy. There is also peribronchial and septal thickening in the ESTRELLITA suspicious for lympangitic carcinomatosis and trace pleural fluid is seen. Multiple lucent and sclerotic bone lesions c/w metastatic disease, with probably pathologic fracture of the left lateral 6th rib. He was on ASA and Plavix, therefore his procedures were scheduled as outpatient. Colonoscopy on 3/23/18 showed hemorrhoids and diverticulosis, but no masses. IR biopsy of the lingular mass was performed on 4/12/18. Result showed mucinous adenocarcinoma. Molecular testing was negative for  EGFR, PD-L1 55%. Not enough tissue to test for ALK and ROS1.   2. PET scan on 18 showed a primary lung mass left upper lobe invading the pre-vascular anterior mediastinum.  SUV max 7.34.  There are diffuse bone metastases seen throughout the entire central axial red marrow skeleton.  For index purposes medial left iliac bone SUV max 8.17.  There are diffuse lung metastases bilaterally.  Right posterior pleural based SUV max 4.02.  There is a left pleural effusion.  There is a small pericardial effusion.  MRI brain without contrast showed no overt intracranial metastases.   3. Keytruda started on 18. Was in the ED for dizziness from low BP. Improved after IVF. PET scan on 18 showed interim response. Cycle 4 on 18.  4. Cannot give Xgeva due to dental problems. He has teeth gotten loose and falling out, but unable to have dental work done due to previous radiation.      INTERVAL HISTORY:   Mr. Cruz returns today for continued treatment. Continues to feel well. Eating and drinking well. Energy level good.      ECO     ROS:   See interval history, otherwise negative.     Physical Examination:   Vital signs reviewed.   Gen: well hydrated, well developed, in no acute distress.  HEENT: normocephalic, anicteric sclerae, PERRLA, EOMI, oropharynx clear  Neck: supple, no JVD, thyromegaly, cervical or supraclavicular LAD  Lungs: CTAB, no wheezes or rales  Heart: RRR, no M/R/G  Abdomen: soft, no tenderness, non-distended, spleen edge 3 cm below the left costal margin, no mass, or hernia. BS present  Ext: no clubbing, cyanosis, or edema  Neuro: alert and oriented x 4, no focal neuro deficit  Skin: no rash, erythema, open wound or ulcers  Psych: pleasant and appropriate mood and affect     Objective:      Diagnostic Tests:  PET scan 18:  There has been some but not complete response to therapy.    Index primary SUV max 4.22.    Index bone medial left ilium index SUV max 7.17.    Index right pleural  based lung nodule SUV max 4.02.     Laboratory Data:  Labs reviewed. Cr stable. Liver enzymes good.      Assessment/Plan:      1. Adenocarcinoma of lung, stage 4, left    2. Multiple lesions of metastatic malignancy in lumbosacral spine    3. Malignant neoplasm metastatic to lung, unspecified laterality    4. Encounter for antineoplastic immunotherapy    5. Essential thrombocythemia    6. Abnormal results of thyroid function studies     7. Coronary artery disease involving native coronary artery of native heart without angina pectoris    8. CKD (chronic kidney disease) stage 3, GFR 30-59 ml/min    9. History of throat cancer    10. Benign essential HTN           1-4  - Doing well.   - Keytruda cycle 4 today, return on Thursday and next Monday for IVF  - cannot give Xgeva due to dental problems. Unable to get dental work done due to previous radiation.      5  - JAK2 positive  - aspirin and plavix for now. No Hydrea for now as it may worsen anemia    6.  - monitor TSH as he is on immunotherapy which can cause hypo- or hyperthyroidism    7.  - stable. C/w aspirin and plavix    8.  - stable. IVF    9.   - LANI    10.  - BP good. C/w current meds      Plan:  RTC in 3 weeks for cycle 5. Restaging CT scan after cycle 5

## 2018-07-30 NOTE — PLAN OF CARE
Problem: Patient Care Overview (Adult)  Goal: Plan of Care Review  Outcome: Ongoing (interventions implemented as appropriate)  IVF administered, patient tolerated well. VSS, NAD. D/C'd home with self.

## 2018-08-08 NOTE — TELEPHONE ENCOUNTER
Informed pt's daughter that pt's prescription was approved and called into pt's preferred pharmacy. Daughter voiced understanding

## 2018-08-08 NOTE — TELEPHONE ENCOUNTER
----- Message from Adriana Avila sent at 8/8/2018  9:05 AM CDT -----  Contact: pt' daughter  Can the clinic reply in [facility]: no    Please refill the medication(s) listed below. Please call the patient when the prescription(s) is ready for  at this phone number      [phone] ext [phone]    Medication #1 traMADol (ULTRAM) 50 mg tablet  Medication #2    Preferred Pharmacy:[facility] -- [address], [address]

## 2018-08-14 PROBLEM — C78.01 MALIGNANT NEOPLASM METASTATIC TO RIGHT LUNG: Status: ACTIVE | Noted: 2018-01-01

## 2018-08-14 PROBLEM — Z51.12 ENCOUNTER FOR ANTINEOPLASTIC IMMUNOTHERAPY: Status: ACTIVE | Noted: 2018-01-01

## 2018-08-14 NOTE — PLAN OF CARE
Problem: Patient Care Overview (Adult)  Goal: Plan of Care Review  Outcome: Ongoing (interventions implemented as appropriate)  Keytruda administered, patient tolerated well. VSS, NAD. D/C'd home with daughter.

## 2018-08-14 NOTE — Clinical Note
IVF at 8 am on 8/16, 8/21, 8/28, 9/4CBC, CMP, TSH, and PET scan on 9/7RTC on 9/11, see me, then appt for Keytruda/IVF

## 2018-08-20 NOTE — ED PROVIDER NOTES
"Encounter Date: 8/20/2018    SCRIBE #1 NOTE: I, Padma Quiroz, am scribing for, and in the presence of, Dr. Gruber.       History     Chief Complaint   Patient presents with    Abdominal Pain     pt with abdominal pain and nausea ,vomiting and diarrhea x two hours.      Time seen by provider: 2:11 PM    This is a 83 y.o. male, with history of HTN, CKD, CAD, MI with stent placement, and stage IV lung cancer, who presents with complaint of abdominal pain with nausea, vomiting and diarrhea today. Per EMS, he was given 4 Zofran and 500 cc fluid bolus. Blood pressure dropped from 180 to 150s on orthostatics. The 8/10 pain began about 1 hr after eating a hamburger from a fast food restaurant. He reports improved nausea, two or three episodes of vomiting, and "a couple" episodes of diarrhea. He denies fever, change in cough from baseline, chest pain, abdominal distention, blood in vomit or stool, arm pain, or leg pain. He experienced similar episode prior to cancer diagnosis. He receives chemo every three weeks on Tuesdays. He is on Plavix and aspirin. He denies use of tobacco, alcohol, or illicit drugs.      The history is provided by the patient and the EMS personnel.     Review of patient's allergies indicates:  No Known Allergies  Past Medical History:   Diagnosis Date    Age-related osteoporosis without current pathological fracture 6/6/2017    Benign prostatic hyperplasia 05/10/2017    Cataract     CKD (chronic kidney disease) stage 3, GFR 30-59 ml/min 5/12/2017    Coronary artery disease involving native coronary artery of native heart without angina pectoris 05/10/2017    cards/Dr. Melva Reilly    Esophageal cancer 1997    s/p surgery, XRT    H/O heart artery stent     LAD 9/22/16 Zotarolimus eluting coronary stent system- [facility]    Hypertension     Primary osteoarthritis of right knee 05/10/2017    sees ortho, knee replacement recommended     Past Surgical History:   Procedure Laterality Date    " CATARACT EXTRACTION Right     july 2017    CORONARY ANGIOPLASTY WITH STENT PLACEMENT      LCx 1999, mid RCA 2010, LAD 9/2016; cards/Dr. Melva Reilly    THROAT SURGERY      2/2 throat cancer     Family History   Problem Relation Age of Onset    Cancer Mother         stomach    Hypertension Mother     Hypertension Daughter     Hypertension Son     Hypertension Daughter     Blindness Neg Hx     Amblyopia Neg Hx     Cataracts Neg Hx     Glaucoma Neg Hx     Macular degeneration Neg Hx     Retinal detachment Neg Hx     Strabismus Neg Hx     Diabetes Neg Hx     Stroke Neg Hx     Thyroid disease Neg Hx     Colon cancer Neg Hx     Colon polyps Neg Hx     Esophageal cancer Neg Hx     Irritable bowel syndrome Neg Hx     Inflammatory bowel disease Neg Hx     Stomach cancer Neg Hx      Social History     Tobacco Use    Smoking status: Former Smoker     Years: 50.00     Types: Cigarettes    Smokeless tobacco: Never Used   Substance Use Topics    Alcohol use: No    Drug use: No     Review of Systems   Constitutional: Negative for chills and fever.   HENT: Negative for congestion and sore throat.    Eyes: Negative for visual disturbance.   Respiratory: Negative for cough and shortness of breath.    Cardiovascular: Negative for chest pain and palpitations.   Gastrointestinal: Positive for abdominal pain, diarrhea, nausea and vomiting. Negative for abdominal distention, blood in stool and constipation.        Negative for blood in vomit.   Genitourinary: Negative for decreased urine volume, dysuria and frequency.   Musculoskeletal: Negative for joint swelling, neck pain and neck stiffness.        Negative for arm or leg pain.   Skin: Negative for rash and wound.   Neurological: Negative for weakness, numbness and headaches.   Psychiatric/Behavioral: Negative for behavioral problems and confusion.       Physical Exam     Initial Vitals [08/20/18 1354]   BP Pulse Resp Temp SpO2   130/80 76 15 97.5 °F (36.4  °C) 100 %      MAP       --         Physical Exam    Nursing note and vitals reviewed.  Constitutional: He appears well-developed and well-nourished. He is not diaphoretic.  Non-toxic appearance. He does not appear ill. No distress.   HENT:   Head: Normocephalic and atraumatic.   Eyes: EOM are normal. Pupils are equal, round, and reactive to light.   Neck: Normal range of motion. Neck supple.   Cardiovascular: Normal rate, regular rhythm and normal heart sounds. Exam reveals no gallop and no friction rub.    No murmur heard.  Pulmonary/Chest: Breath sounds normal. No respiratory distress. He has no wheezes. He has no rhonchi. He has no rales.   Abdominal: Bowel sounds are normal. There is tenderness in the right upper quadrant, epigastric area and periumbilical area. There is no rebound, no guarding and negative Brown's sign.   Musculoskeletal: Normal range of motion. He exhibits no edema or tenderness.   Neurological: He is alert and oriented to person, place, and time.   Skin: Skin is warm and dry. No rash noted. No pallor.   Psychiatric: He has a normal mood and affect. His behavior is normal. Judgment and thought content normal.         ED Course   Procedures  Labs Reviewed   CBC W/ AUTO DIFFERENTIAL - Abnormal; Notable for the following components:       Result Value    WBC 17.33 (*)     Hemoglobin 12.2 (*)     MCH 25.6 (*)     MCHC 30.3 (*)     RDW 17.9 (*)     Platelets 530 (*)     Gran # (ANC) 15.5 (*)     Lymph # 0.9 (*)     Gran% 89.1 (*)     Lymph% 4.9 (*)     All other components within normal limits   COMPREHENSIVE METABOLIC PANEL - Abnormal; Notable for the following components:    CO2 22 (*)     Glucose 147 (*)     Creatinine 1.7 (*)     Albumin 3.3 (*)     Alkaline Phosphatase 163 (*)     ALT 9 (*)     eGFR if  42 (*)     eGFR if non  37 (*)     All other components within normal limits   LIPASE   URINALYSIS, REFLEX TO URINE CULTURE    Narrative:     Preferred  Collection Type->Urine, Clean Catch          Imaging Results          CT Abdomen Pelvis  Without Contrast (Final result)  Result time 08/20/18 16:27:48    Final result by Liban Spann MD (08/20/18 16:27:48)                 Impression:      There is mural stratification of the transverse and descending colon with liquid stool in the distal colon.  Findings felt to be related to colitis/enteritis of either infectious or inflammatory origin.    Numerous small nodules at the bilateral lung bases appear decreased in size when compared to recent PET-CT.    Extensive bony metastasis again noted.      Electronically signed by: Liban Spann MD  Date:    08/20/2018  Time:    16:27             Narrative:    EXAMINATION:  CT ABDOMEN PELVIS WITHOUT CONTRAST    CLINICAL HISTORY:  Abdominal pain, unspecified;    TECHNIQUE:  Low dose axial images, sagittal and coronal reformations were obtained from the lung bases to the pubic symphysis.  Contrast was not administered.    COMPARISON:  CT 03/13/2018, PET-CT 07/02/2018    FINDINGS:  Heart: Normal in size. No pericardial effusion.    Lung Bases: Scattered pulmonary nodules appears slightly smaller than when compared to recent PET-CT.  Pleural effusions have resolved.    Liver: Normal in size and attenuation, with no focal hepatic lesions.    Gallbladder: Cholelithiasis.    Bile Ducts: No evidence of dilated ducts.    Pancreas: No mass or peripancreatic fat stranding.    Spleen: Enlarged.    Adrenals: Unremarkable.    Kidneys/ Ureters: Unremarkable.    Bladder: No evidence of wall thickening.    Reproductive organs: Unremarkable.    GI Tract/Mesentery: No evidence of bowel obstruction.  There is liquid stool in the distal colon.  There is diverticulosis without evidence of acute diverticulitis.  There is some mild mural stratification of the transverse and descending colon.    Peritoneal Space: No ascites. No free air.    Retroperitoneum: No significant  adenopathy.    Abdominal wall: Unremarkable.    Vasculature: Unchanged mild aneurysmal dilatation of the infrarenal abdominal aorta.    Bones: Numerous lytic and sclerotic lesions throughout the visualized spine and pelvic bones.                               X-Ray Abdomen Flat And Erect (Final result)  Result time 08/20/18 15:06:07    Final result by Jeremiah Flores MD (08/20/18 15:06:07)                 Impression:      Unremarkable intestinal gas pattern.    Heterogeneity of the bone density consistent with patient's history of osseous metastatic disease.      Electronically signed by: Jeremiah Flores MD  Date:    08/20/2018  Time:    15:06             Narrative:    EXAMINATION:  XR ABDOMEN FLAT AND ERECT    CLINICAL HISTORY:  Abdominal Pain;    TECHNIQUE:  Flat and erect AP views of the abdomen were performed.    COMPARISON:  03/12/2018.    FINDINGS:  The intestinal gas pattern is grossly unremarkable with no dilated loops of bowel evident.  There is no evidence for free intraperitoneal air.  There is heterogeneity of the bone density most pronounced within the pelvis consistent with patient's known history of osseous metastatic disease.                              X-Rays:   Independently Interpreted Readings:   Abdomen:   Flat and Erect of Abdomen - No air fluid levels. No free air, nonspecific bowel gas pattern.     Medical Decision Making:   Clinical Tests:   Lab Tests: Ordered and Reviewed  Radiological Study: Ordered and Reviewed  ED Management:  Emergent evaluation of a 83-year-old male with multiple medical problems who presents with complaint of sudden onset nausea vomiting diarrhea and abdominal pain which occurred 1 hr after eating fast food hamburger.  Vital signs are benign, afebrile.  Paramedics did report positive orthostasis.  Physical exam revealed tenderness in the epigastrium, periumbilical area, and right upper quadrant but no Brown sign. Labs do reveal mild elevated alkaline phosphatase, but  at his baseline - he does have a history of bony Mets.  There is leukocytosis present, but he does have a history of recent leukocytosis and is currently having an acute illness.  Other labs revealed stable renal insufficiency, no major electrolyte disturbance.  Abdominal x-ray showed no acute process, but patient is family requested a CT scan which also showed no surgical process, consistent with enteritis.  Patient's overall presentation is most suggestive of food poisoning.  He was discharged in good condition after hydration and treatment in the ED and encourage close follow-up with PCP or return for worsening.  He was given a prescription for Zofran.            Scribe Attestation:   Scribe #1: I performed the above scribed service and the documentation accurately describes the services I performed. I attest to the accuracy of the note.    Attending Attestation:           Physician Attestation for Scribe:  Physician Attestation Statement for Scribe #1: I, Dr. Gruber, reviewed documentation, as scribed by Padma Quiroz in my presence, and it is both accurate and complete.                 ED Course as of Aug 20 2354   Mon Aug 20, 2018   1557 On reassessment, patient states he has had 1 additional episode of diarrhea since arrival in the ED.  He reports that pain is improved, but he still pretty uncomfortable.  He states he does not desire any further pain medication, but requests a CT scan.  [AK]      ED Course User Index  [AK] Sheron Gruber MD     Clinical Impression:     1. Gastroenteritis    2. Elevated blood pressure, situational    3. Adenocarcinoma of lung, stage 4, left                                  Sheron Gruber MD  08/20/18 6241

## 2018-08-20 NOTE — ED NOTES
Pt presents with c/o N/V/D for the past hour after eating burFortumo. Pt received his last round of chemo on Tuesday for lung CA. Pt denies any CP or sob. Pt is AAOx4. RR are even and unlabored. Skin is warm and dry.

## 2018-08-20 NOTE — ED NOTES
Pt and pt's daughter expressed concern over trending bp's. MD made aware. Pt also requested water, okay per MD to po challenge.

## 2018-08-20 NOTE — ED NOTES
Pt resting in stretcher, HOB at 45 degrees. Pt AAOx4, RR even and unlabored, NAD noted. Pt has no questions, concerns, or needs at this time. Daughter at bedside. Pt remains on continuous bp and pulse ox monitors. Bed locked and in lowest position, side rails up x2, and call light within reach. Will continue to monitor.

## 2018-08-21 NOTE — TELEPHONE ENCOUNTER
"Returned call and spoke with Ms Clemencia. Informed her of the following --Per Dr Manzo:  "Let us move the fluids appointment to tomorrow. I still prefer him to receive extra fluids this week, especially given he had food poisoning recently."    Mr Cruz has been scheduled tomorrow @ 8:30am.  "

## 2018-08-21 NOTE — TELEPHONE ENCOUNTER
----- Message from Ellen George sent at 8/21/2018  8:09 AM CDT -----  Name of Who is Calling: Clemencia ([facility])    What is the request in detail: Pt was admitted to hospital yesterday and she needs to discuss the pts hospital visit and to cancel the pts visit today      Can the clinic reply by [facility]:    No       What Number to Call Back if not in [facility]: [phone] or work number after [phone] ext [phone]

## 2018-08-21 NOTE — TELEPHONE ENCOUNTER
Return call and spoke with Clemencia, Mr Cruz's daughter. Mr Cruz was seen in the ED yesterday for poss Food Poisoning and elevated blood pressure. Patient was given fluids and patient was discharged home yesterday. Mr Cruz is scheduled today for fluids. Clemencia asking since her father received fluids yesterday, do he need to still come into the clinic for additional fluids. Please advise.

## 2018-08-21 NOTE — ED NOTES
Pt and daughter okay to d/c with bp if 165/74. Pt has monitor at home and will continue to monitor bp. Pt advised to return to ED if HA, visual changes, sweats/chills, chest pain, or SOB occur. Pt verbalized understanding. Pt had no further questions or concerns.

## 2018-08-22 NOTE — PLAN OF CARE
Problem: Patient Care Overview (Adult)  Goal: Plan of Care Review  Outcome: Ongoing (interventions implemented as appropriate)  IVF administered, patient tolerated well. VSS, NAD. D/C'd home with daughter.

## 2018-09-11 NOTE — TELEPHONE ENCOUNTER
Called and spoke with Ms Khanna. Called to inform her Mr Cruz blood pressure was elevated this morning.  Dr Manzo has increased Mr Cruz's B/P medication--Benazepril 20mg 1 tablet daily to 40 mg 2 tablets daily x 1 week. Mr Cruz should check his B/P daily and Mr Cruz to call our clinic and inform Dr Manzo if his blood pressure reading drop low with the medication increase.   Additional tablets -medication has been forward to [facility] pharmacy.     Ms Clemencia also requesting a call back to discuss the results of Mr Cruz PET scan. Please return the call to her work number [phone] ext [phone]

## 2018-09-11 NOTE — PLAN OF CARE
Pt given Lotensin 20mg PO at 1155. Pt BP remains elevated in 180-200/. Pt asymptomatic. Dr. Manzo notified. Dr. Manzo increased his home Lotensin dose. Per Dr. Manzo, pt is okay to go home with strict orders to monitor his BP at home and return for worsening of symptoms. Pt provided home BP log.

## 2018-09-11 NOTE — TELEPHONE ENCOUNTER
----- Message from Erin Hansen sent at 9/11/2018  8:19 AM CDT -----            Name of Who is Calling: pt daughter Clemencia Cruz     What is the request in detail: please call pt daughter who could not be with him this morning at his appt, with the results of his test     Can the clinic reply by [facility]: no    What Number to Call Back if not in [facility] [phone] ext [phone]

## 2018-09-11 NOTE — PROGRESS NOTES
Subjective:       Patient ID: Neville Cruz is a 83 y.o. male.     Chief Complaint: follow up for metastatic lung adenocarcinoma     Diagnosis:  1. 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  2. 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      GENOMICS:  Pathology of ligular mass biopsy on 4/12/18: PD-L1 55%, EGFR wild-type. Not sufficient tissue for ALK and ROS1  Guardant 360 test 5/3/2018: 34.6% JAK2 V617F mutation, 0.2% TP53 W91 mutation. Neg for ALK and ROS1     Oncologic History:  1. Mr. Cruz is an 83 yo AAM with history of esophageal cancer s/p radiation in 1997, CAD s/p PCI in 1999, 2010 and 2016, CKD stage IV, who saw me on 4/19/18 for further management of newly diagnosed lung cancer. Smoked 2 PPD before but stopped in 1997 when his esophageal cancer was diagnosed. He initially presented to ED on 3/12/18 with abdominal cramping and diarrhea. CT A/P without contrast on 3/13/18 showed wall thickening and extending from the splenic flexure to the rectum. Innumerable pulmonary nodules and lytic lesions in the lumbosacral spine c/w metastatic disease. CT chest without contrast on 3/15/18 showed innumerable diffuse pulmonary nodules c/w metastatic disease. 8.8 cm opacity in the left upper lobe, highly suspicious for primary lung malignancy. There is also peribronchial and septal thickening in the ESTRELLITA suspicious for lympangitic carcinomatosis and trace pleural fluid is seen. Multiple lucent and sclerotic bone lesions c/w metastatic disease, with probably pathologic fracture of the left lateral 6th rib. He was on ASA and Plavix, therefore his procedures were scheduled as outpatient. Colonoscopy on 3/23/18 showed hemorrhoids and diverticulosis, but no masses. IR biopsy of the lingular mass was performed on 4/12/18. Result showed mucinous adenocarcinoma. Molecular testing was negative for  EGFR, PD-L1 55%. Not enough tissue to test for ALK and ROS1.   2. PET scan on 18 showed a primary lung mass left upper lobe invading the pre-vascular anterior mediastinum.  SUV max 7.34.  There are diffuse bone metastases seen throughout the entire central axial red marrow skeleton.  For index purposes medial left iliac bone SUV max 8.17.  There are diffuse lung metastases bilaterally.  Right posterior pleural based SUV max 4.02.  There is a left pleural effusion.  There is a small pericardial effusion.  MRI brain without contrast showed no overt intracranial metastases.   3. Keytruda started on 18. Was in the ED for dizziness from low BP. Improved after IVF. PET scan on 18 showed interim response. Cycle 4 on 18., cycle 5 on . Cycle 6 on   4. Cannot give Xgeva due to dental problems. He has teeth gotten loose and falling out, but unable to have dental work done due to previous radiation.      INTERVAL HISTORY:   Mr. Cruz returns today for continued treatment. Continues to feel tired but still able to keep his daily activities. Went to ER on  with abd pain, N/V, diarrhea, was diagnosed with gastroenteritis. Denies other complaints. Feels good now. PET scan showed stable disease.      ECO     ROS:   See interval history, otherwise negative.     Physical Examination:   Vital signs reviewed.   Gen: well hydrated, well developed, in no acute distress.  HEENT: normocephalic, anicteric sclerae, PERRLA, EOMI, oropharynx clear  Neck: supple, no JVD, thyromegaly, cervical or supraclavicular LAD  Lungs: CTAB, no wheezes or rales  Heart: RRR, no M/R/G  Abdomen: soft, no tenderness, non-distended, spleen edge 3 cm below the left costal margin, no mass, or hernia. BS present  Ext: no clubbing, cyanosis, or edema  Neuro: alert and oriented x 4, no focal neuro deficit  Skin: no rash, erythema, open wound or ulcers  Psych: pleasant and appropriate mood and affect     Objective:      Diagnostic  Tests:  PET scan 9/7/18:  Lung primary and metastases have all improved since the prior study.    Index left upper lobe SUV max 2.11, previously 4.22.    Index medial left iliac bone SUV max 5.65, previously 7.17.    Index right lung metastasis background activity.     Laboratory Data:  Labs from 8/20 reviewed. Labs from today pending     Assessment/Plan:      1. Adenocarcinoma of lung, stage 4, left    2. Malignant neoplasm metastatic to right lung    3. Metastasis to bone    4. Encounter for antineoplastic immunotherapy    5. Fatigue, unspecified type    6. Essential thrombocythemia    7. Coronary artery disease involving native coronary artery of native heart without angina pectoris    8. Benign essential HTN    9. CKD (chronic kidney disease) stage 3, GFR 30-59 ml/min        1-4  - Doing well. PET scan showed continued improvement  - Keytruda cycle 6 today, return on Thursday and weekly starting next Tuesday for IVF  - cannot give Xgeva due to dental problems. Unable to get dental work done due to previous radiation.   - will restage after 3 cycles of Keytruda  - asked patient to take imodium prn for diarrhea from keytruda  - RTC in 3 weeks for cycle 7 of Keytruda    5.  - monitor TSH as he is on Keytruda     6.  - JAK2 positive  - aspirin and plavix for now. No Hydrea for now as it may worsen anemia     7.  - stable. C/w aspirin and plavix    8.  - BP slightly elevated, asymptomatic. Will monitor for now as his BP may drop with diarrhea from keytruda     9.  - stable. monitor

## 2018-09-11 NOTE — PLAN OF CARE
Problem: Patient Care Overview (Adult)  Goal: Plan of Care Review  Outcome: Ongoing (interventions implemented as appropriate)  Pt tolerated C6 Keytruda treatment. No signs of reaction. See nursing note regarding BP. AVS given. Pt to return 9.13 for IVF.

## 2018-09-11 NOTE — PROGRESS NOTES
BP elevated 180/80 at chemo room. Took benazepril 20 mg this morning. Will give additional 20 mg now. Increase benazepril to 40 mg daily starting from tomorrow.

## 2018-09-13 NOTE — PLAN OF CARE
Problem: Patient Care Overview (Adult)  Goal: Plan of Care Review  Outcome: Ongoing (interventions implemented as appropriate)  IVF administered, patient tolerated well. D/C'd home with blood pressure log, instructed to check BP at home 2x day and bring log with him to next appt.

## 2018-09-13 NOTE — TELEPHONE ENCOUNTER
Just received a call from Ms. Clemencia, Mr Cruz daughter. Her father called her and states his B/P is 196/100. Called and spoke with Mr Cruz. He states his took his Benazepril 40mg this morning and he took a 20mg Benazepril after the evaluated B/P reading. Per Mr Cruz, his B/P increased higher after taking the 20mg Benazepril. Mr Cruz voice he was going to go the the ED for further evaluation.   Dr Manzo has forward prescription to pharmacy for Amlodipine 10mg. Clemencia called and informed that the Rx has been sent to [facility]. She states she was in route to get Mr Cruz and bring him to the ED.

## 2018-09-14 PROBLEM — F41.9 ANXIETY: Status: ACTIVE | Noted: 2018-01-01

## 2018-09-14 NOTE — TELEPHONE ENCOUNTER
Returned call and spoke with Ms Clemencia. Mr Cruz was taken to the ED yesterday for further evaluation of his high blood pressure. Patient b/p came down to 177/80. Patient was discharged and advised to f/u with his MD. B/P was checked today and it remains high, 177/84. Mr Cruz will start his Amlodipine today. Clemencia will bring it home today. Clemencia thinks her father has been very anxious the last week and she thinks this may be the cause of his elevated B/P. Clemencia asking if Dr Manzo can prescribe something mild that he can take that may have with his anxiety. Please advise.

## 2018-09-14 NOTE — TELEPHONE ENCOUNTER
----- Message from Lyndsey Zabala sent at 9/14/2018  9:18 AM CDT -----  Contact: pt's daughter            Name of Who is Calling: Clemencia      What is the request in detail: requesting a call back in reference to concerns about her father.       Can the clinic reply by [facility]: no      What [facility]: [phone] cell or [phone] ext [phone] work

## 2018-09-14 NOTE — TELEPHONE ENCOUNTER
Called and spoke with Ms Khanna. Informed a Xanax Rx has been forward to [facility] pharmacy for Mr Cruz.

## 2018-09-18 NOTE — PLAN OF CARE
Problem: Patient Care Overview (Adult)  Goal: Plan of Care Review  Outcome: Ongoing (interventions implemented as appropriate)  Patient tolerated IVF without issue.  VSS,  AVS provided. New blood pressure log provided to patient.

## 2018-09-25 NOTE — PLAN OF CARE
Problem: Patient Care Overview (Adult)  Goal: Plan of Care Review  Outcome: Ongoing (interventions implemented as appropriate)  Pt tolerated IVF without issue today. VSS. AVS given. Pt to return on 10.2 for next chemo treatment.

## 2018-10-02 PROBLEM — I71.40 ABDOMINAL AORTIC ANEURYSM (AAA) WITHOUT RUPTURE: Status: ACTIVE | Noted: 2018-01-01

## 2018-10-02 PROBLEM — I70.0 AORTIC ATHEROSCLEROSIS: Status: ACTIVE | Noted: 2018-01-01

## 2018-10-02 PROBLEM — I77.819 ECTATIC AORTA: Status: ACTIVE | Noted: 2018-01-01

## 2018-10-02 NOTE — TELEPHONE ENCOUNTER
Returned call and spoke with Ms Tannera. Ms Mckinney was unable to accompany her Mr Anthony to his appointment today. She was calling to inquire the reason Dr Manzo has ordered a Color Flow Echo Doppler. Informed Ms Khanna, Dr Manzo request a baseline Echo due to the large amount of IVF Mr Cruz has been getting. Ms Khanna states she will come with her father next visit and she will have a chance to discuss new onset issue with her father.

## 2018-10-02 NOTE — Clinical Note
Get first available echo. Return for IVF on 10/4, 10/5, 10/8, 10/11, 10/15, 10/18. RTC on 10/23 with CBC, CMP, magnesium, TSH, see me, then get Keytruda

## 2018-10-02 NOTE — PLAN OF CARE
Problem: Patient Care Overview (Adult)  Goal: Plan of Care Review  Outcome: Ongoing (interventions implemented as appropriate)  Patient tolerated Keytruda infusion and liter of NS without issue. VSS, AVS provided. Pt to follow up 10/4/18 at 0830 for IV fluids.

## 2018-10-02 NOTE — TELEPHONE ENCOUNTER
----- Message from Adriana Avila sent at 10/2/2018  3:37 PM CDT -----  Contact: daughter            Name of Who is Calling: OMID RODGERS [[id number]]      What is the request in detail: pt daughter calling in regards to test pt has coming up.. Please advise    Can the clinic reply by [facility]: no      What Number to Call Back if not in [facility]: [phone] ext [phone]

## 2018-10-02 NOTE — TELEPHONE ENCOUNTER
----- Message from Dallin Mixon sent at 10/2/2018  4:56 PM CDT -----  Contact: Clemencia (daughter)            Name of Who is Calling: Clemencia (daughter)      What is the request in detail: Returning a call      Can the clinic reply by [facility]: no      What Number [facility]: [phone] (cell) or after 9am [phone] ext [phone] (work)

## 2018-10-04 NOTE — PLAN OF CARE
Problem: Patient Care Overview (Adult)  Goal: Plan of Care Review  Outcome: Ongoing (interventions implemented as appropriate)  Pt tolerated IVF. VSS. AVS given. Pt to return tomorrow for IVF.

## 2018-10-05 NOTE — PLAN OF CARE
Problem: Patient Care Overview (Adult)  Goal: Plan of Care Review  Outcome: Ongoing (interventions implemented as appropriate)  Pt tolerated IVF without issue. VSS. AVS given. Pt to return on 10.8 for IVF.

## 2018-10-08 NOTE — PLAN OF CARE
Problem: Patient Care Overview (Adult)  Goal: Plan of Care Review  Outcome: Ongoing (interventions implemented as appropriate)  Pt tolerated IVF without issue. VSS. AVS given.

## 2018-10-23 NOTE — TELEPHONE ENCOUNTER
Returned call and spoke with Ms Khanna, Mr Cruz daughter. Ms Khanna was not able to accompany Mr Cruz to his appointment today. Ms Khanna wish to discuss today's visit with Dr Manzo.

## 2018-10-23 NOTE — Clinical Note
IVF on 10/25, 10/26, 10/29, 11/1, 11/5, 11/8. Get PET scan on 11/8. On 11/13, get CBC, CMP, TSH, see me, then get keytruda

## 2018-10-23 NOTE — PROGRESS NOTES
Subjective:       Patient ID: Neville Cruz is a 83 y.o. male.     Chief Complaint: follow up for metastatic lung adenocarcinoma     Diagnosis:  1. 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  2. 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      GENOMICS:  Pathology of ligular mass biopsy on 4/12/18: PD-L1 55%, EGFR wild-type. Not sufficient tissue for ALK and ROS1  Guardant 360 test 5/3/2018: 34.6% JAK2 V617F mutation, 0.2% TP53 W91 mutation. Neg for ALK and ROS1     Oncologic History:  1. Mr. Cruz is an 81 yo AAM with history of esophageal cancer s/p radiation in 1997, CAD s/p PCI in 1999, 2010 and 2016, CKD stage IV, who saw me on 4/19/18 for further management of newly diagnosed lung cancer. Smoked 2 PPD before but stopped in 1997 when his esophageal cancer was diagnosed. He initially presented to ED on 3/12/18 with abdominal cramping and diarrhea. CT A/P without contrast on 3/13/18 showed wall thickening and extending from the splenic flexure to the rectum. Innumerable pulmonary nodules and lytic lesions in the lumbosacral spine c/w metastatic disease. CT chest without contrast on 3/15/18 showed innumerable diffuse pulmonary nodules c/w metastatic disease. 8.8 cm opacity in the left upper lobe, highly suspicious for primary lung malignancy. There is also peribronchial and septal thickening in the ESTRELLITA suspicious for lympangitic carcinomatosis and trace pleural fluid is seen. Multiple lucent and sclerotic bone lesions c/w metastatic disease, with probably pathologic fracture of the left lateral 6th rib. He was on ASA and Plavix, therefore his procedures were scheduled as outpatient. Colonoscopy on 3/23/18 showed hemorrhoids and diverticulosis, but no masses. IR biopsy of the lingular mass was performed on 4/12/18. Result showed mucinous adenocarcinoma. Molecular testing was negative for  EGFR, PD-L1 55%. Not enough tissue to test for ALK and ROS1.   2. PET scan on 18 showed a primary lung mass left upper lobe invading the pre-vascular anterior mediastinum.  SUV max 7.34.  There are diffuse bone metastases seen throughout the entire central axial red marrow skeleton.  For index purposes medial left iliac bone SUV max 8.17.  There are diffuse lung metastases bilaterally.  Right posterior pleural based SUV max 4.02.  There is a left pleural effusion.  There is a small pericardial effusion.  MRI brain without contrast showed no overt intracranial metastases.   3. Keytruda started on 18. Was in the ED for dizziness from low BP. Improved after IVF. PET scan on 18 showed interim response. Cycle 4 on 18., cycle 5 on . Cycle 6 on , cycle 7 on 10/2/18, cycle 8 on 10/23/18.   4. Cannot give Xgeva due to dental problems. He has teeth gotten loose and falling out, but unable to have dental work done due to previous radiation.      INTERVAL HISTORY:   Mr. Cruz returns today for continued treatment. Feels weak sometimes. IVF helps. Has neck pain with movement and stiffness over the past 2 weeks. Not taking amlodipine every day. Only take it when his BP high. SBP in 120s most of the time.      ECO     ROS:   See interval history, otherwise negative.     Physical Examination:   Vital signs reviewed.   Gen: well hydrated, well developed, in no acute distress.  HEENT: normocephalic, anicteric sclerae, PERRLA, EOMI, oropharynx clear  Neck: supple, no JVD, thyromegaly, cervical or supraclavicular LAD  Lungs: CTAB, no wheezes or rales  Heart: RRR, no M/R/G  Abdomen: soft, no tenderness, non-distended, spleen edge 3 cm below the left costal margin, no mass, or hernia. BS present  Ext: no clubbing, cyanosis, or edema  Neuro: alert and oriented x 4, no focal neuro deficit  Skin: no rash, erythema, open wound or ulcers  Psych: pleasant and appropriate mood and affect     Objective:       Diagnostic Tests:  PET scan 9/7/18:  Lung primary and metastases have all improved since the prior study.    Index left upper lobe SUV max 2.11, previously 4.22.    Index medial left iliac bone SUV max 5.65, previously 7.17.    Index right lung metastasis background activity.     Laboratory Data:  Labs from today reviewed. Cr 1.6     Assessment/Plan:      1. Malignant neoplasm metastatic to right lung    2. Multiple lesions of metastatic malignancy in lumbosacral spine    3. Adenocarcinoma of lung, stage 4, left    4. Encounter for antineoplastic immunotherapy    5. Essential thrombocythemia    6. Muscle spasm    7. Fatigue, unspecified type    8. Anorexia    9. Atherosclerosis of aorta    10. Essential hypertension      1-4  - Doing well. Stable disease on Keytruda  - Keytruda cycle 8 today  - needs IVF as he get weak at times. IVF this Thursday and Friday, and every Mon/Thurs on weeks 2 and 3  - cannot give Xgeva due to dental problems. Unable to get dental work done due to previous radiation.   - RTC in 3 weeks for cycle 9 of Keytruda. Restaging PET scan prior to return     5.  - JAK2 positive  - aspirin and plavix for now. No Hydrea for now as it may worsen anemia    6.  - flexeril 5 mg tid prn for neck pain    7.  - monitor TSH as patient is on keytruda    8.  - refilled marinol    9.  - c/w aspirin and pravastatin    10.  - BP slightly lower today. Hold amlodipine. Asked patient to check BP every day. If SBP<120, do not take lotensin. If -160, take lotensin 20 mg. If SBP>160, take lotensin 40 mg

## 2018-10-23 NOTE — TELEPHONE ENCOUNTER
----- Message from Lyndsey Zabala sent at 10/23/2018  3:57 PM CDT -----  Contact: pt's daughter, Clemencia            Name of Who is Calling: Clemencia      What is the request in detail: requesting a call back in reference to a few questions she has.Please call and advise      Can the clinic reply by [facility]: no      What Number to Call Back [facility] not in [facility]: [phone] ext [phone]

## 2018-10-23 NOTE — PLAN OF CARE
Problem: Patient Care Overview (Adult)  Goal: Plan of Care Review  Outcome: Ongoing (interventions implemented as appropriate)  Pt tolerated Keytruda without issue. VSS. AVS given.

## 2018-10-25 NOTE — TELEPHONE ENCOUNTER
Spoke with Clemencia. Answered her questions re Mr. Cruz' condition. Encouraged to call should further questions arise.

## 2018-10-26 NOTE — PLAN OF CARE
Problem: Patient Care Overview (Adult)  Goal: Plan of Care Review  Outcome: Ongoing (interventions implemented as appropriate)  Pt tolerated IVF today. Pt noted to have an irregular HR. Dr. Manzo notified. Pt scheduled to get EKG after leaving this appt. AVS given. Pt returning for IVF on 10.29.

## 2018-11-01 NOTE — TELEPHONE ENCOUNTER
Spoke with Ms. Clemencia. Mr. Cruz is having severe pain in his neck that when he sits down to eat he cannot lower his head to eat. Asked MsMindy Clemencia to bring Mr. Cruz to the ER for further evaluation.

## 2018-11-01 NOTE — TELEPHONE ENCOUNTER
Returned call and spoke with Ms Khanna, Mr Cruz daughter. Ms Khanna states her father, while eating he was having difficult moving his head up and down. His daughter states her father mentioned that he was having neck pain, but he states the pain is worsening. Patient also having bone and joint, lower extremities. Please advise.

## 2018-11-01 NOTE — TELEPHONE ENCOUNTER
----- Message from Ariadne Chavez sent at 11/1/2018 12:15 PM CDT -----            Name of Who is Calling: Clemencia (daughter)      What is the request in detail: Pt's daughter  Would like to speak with Silver. She states her dad is having pains in his neck. Please call to discuss.      Can the clinic reply by [facility]: no      What Number to Call Back if not in [facility]:[phone] ext [phone]( states she's going to lunch at 1pm today but she can be reached at this number until 7pm today) or [phone]

## 2018-11-06 NOTE — TELEPHONE ENCOUNTER
Returned call and spoke with Ms. Clemencia. Mr Cruz scheduled for his PET scan on 11/08/2018 and he is also scheduled to receive IV before his PET scan. Patients are required to fast 6 hours before PET scan, so Ms Khanna was asking if her father can get the fluids before his scan. Informed Ms Khanna, water is permitted up until the time of his scan. Also informed Ms Khanna, if Mr Cruz and come in early for his fluids, around 8:00 or 8:30 they will try to give him his fluids then.

## 2018-11-06 NOTE — TELEPHONE ENCOUNTER
----- Message from Eva Morgan MA sent at 11/6/2018  3:38 PM CST -----  Contact: Clemencia (daughter)      ----- Message -----  From: Dallin Mixon  Sent: 11/6/2018  12:21 PM  To: Jov[facility] Romero Staff        Name of Who is Calling: Clemencia (daughter)      What is the request in detail: would like to confirm that patient is suppose to have chemo and a PET scan on the same day. Please advise      Can the clinic reply by [facility]: no      What Number to Call Back [facility] not in [facility]: [phone] ext [phone] (after 1 please)

## 2018-11-12 NOTE — TELEPHONE ENCOUNTER
Returned call and spoke with Ms Khanna, Mr Cruz daughter. Ms Khanna received the results of her fathers PET scan via [facility] and noted the results did not look good. She requesting to discuss the results with Dr Manzo before Mr Cruz clinic visit tomorrow.   Please call back after 1:00. Ms Khanna goes to lunch between 12 and 1:00.

## 2018-11-12 NOTE — TELEPHONE ENCOUNTER
----- Message from Alena Nunez sent at 11/12/2018  9:14 AM CST -----  Contact: Pt's Daughter Clemencia  Name of Who is Calling: Pt's Daughter Clemencia    What is the request in detail: Pt's Daughter Clemencia would like to speak with the nurse or Dr in regards to Mr. Cruz's test results. Please advise.     Can the clinic reply by [facility]: No     What Number to Call Back [facility] not [facility]: [phone] ext [phone]

## 2018-11-13 NOTE — PROGRESS NOTES
Subjective:       Patient ID: Neville Cruz is a 83 y.o. male.     Chief Complaint: follow up for metastatic lung adenocarcinoma     Diagnosis:  1. 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  2. 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      GENOMICS:  Pathology of ligular mass biopsy on 4/12/18: PD-L1 55%, EGFR wild-type. Not sufficient tissue for ALK and ROS1  Guardant 360 test 5/3/2018: 34.6% JAK2 V617F mutation, 0.2% TP53 W91 mutation. Neg for ALK and ROS1     Oncologic History:  1. Mr. Cruz is an 83 yo AAM with history of esophageal cancer s/p radiation in 1997, CAD s/p PCI in 1999, 2010 and 2016, CKD stage IV, who saw me on 4/19/18 for further management of newly diagnosed lung cancer. Smoked 2 PPD before but stopped in 1997 when his esophageal cancer was diagnosed. He initially presented to ED on 3/12/18 with abdominal cramping and diarrhea. CT A/P without contrast on 3/13/18 showed wall thickening and extending from the splenic flexure to the rectum. Innumerable pulmonary nodules and lytic lesions in the lumbosacral spine c/w metastatic disease. CT chest without contrast on 3/15/18 showed innumerable diffuse pulmonary nodules c/w metastatic disease. 8.8 cm opacity in the left upper lobe, highly suspicious for primary lung malignancy. There is also peribronchial and septal thickening in the ESTRELLITA suspicious for lympangitic carcinomatosis and trace pleural fluid is seen. Multiple lucent and sclerotic bone lesions c/w metastatic disease, with probably pathologic fracture of the left lateral 6th rib. He was on ASA and Plavix, therefore his procedures were scheduled as outpatient. Colonoscopy on 3/23/18 showed hemorrhoids and diverticulosis, but no masses. IR biopsy of the lingular mass was performed on 4/12/18. Result showed mucinous adenocarcinoma. Molecular testing was negative for  EGFR, PD-L1 55%. Not enough tissue to test for ALK and ROS1.   2. PET scan on 4/27/18 showed a primary lung mass left upper lobe invading the pre-vascular anterior mediastinum.  SUV max 7.34.  There are diffuse bone metastases seen throughout the entire central axial red marrow skeleton.  For index purposes medial left iliac bone SUV max 8.17.  There are diffuse lung metastases bilaterally.  Right posterior pleural based SUV max 4.02.  There is a left pleural effusion.  There is a small pericardial effusion.  MRI brain without contrast showed no overt intracranial metastases.   3. Keytruda started on 5/22/18. Was in the ED for dizziness from low BP. Improved after IVF. PET scan on 7/2/18 showed interim response. Cycle 4 on 7/24/18., cycle 5 on 8/14. Cycle 6 on 9/11, cycle 7 on 10/2/18, cycle 8 on 10/23/18. restaging PET scan on 11/8/18 showed diffuse bone metastases have progressed not in number but in size and intensity when compared to the prior study.  4. Cannot give Xgeva due to dental problems. He has teeth gotten loose and falling out, but unable to have dental work done due to previous radiation.      INTERVAL HISTORY:   Mr. Cruz returns today for follow up. Has not been feeling well. PS 3. Feels weak, +neck and hip pain. +anorexia. It is getting hard for Clemencia to take care of him now.      ECOG: 3     ROS:   See interval history, otherwise negative.     Physical Examination:   Vital signs reviewed.   Gen: well hydrated, well developed, in no acute distress.  HEENT: normocephalic, anicteric sclerae, PERRLA, EOMI, oropharynx clear  Neck: supple, no JVD, thyromegaly, cervical or supraclavicular LAD  Lungs: CTAB, no wheezes or rales  Heart: RRR, no M/R/G  Abdomen: soft, no tenderness, non-distended, spleen edge 3 cm below the left costal margin, no mass, or hernia. BS present  Ext: no clubbing, cyanosis, or edema  Neuro: alert and oriented x 4, no focal neuro deficit  Skin: no rash, erythema, open wound or  ulcers  Psych: pleasant and appropriate mood and affect     Objective:      Diagnostic Tests:  PET scan 11/8/18:    Index medial left iliac bone metastasis SUV max 6.33, previously 5.65.    Index medial left upper lobe lesion SUV max 2.41, previously 2.11.    There are metastases seen throughout the entire central axial red marrow skeleton including proximal femurs and proximal right humerus.  Visually this has all gotten worse when compared to the prior studies.     Laboratory Data:  Labs from today reviewed. Cr 1.6     Assessment/Plan:      1. Malignant neoplasm metastatic to right lung    2. Cancer related pain    3. Essential thrombocythemia    4. Hypomagnesemia    5. Anorexia    6. Essential hypertension        1  - Mr. Cruz is an 84 yo man with stage IV lung adenocarcinoma with bone mets, who has been getting Keytruda. He has been deteriorating with declining performance status, increased weakness and worsening pain. PET scan showed that his bone metastases have enlarged in size. His PS is 3 now. We had a long discussion. At this point, further keytruda or other chemotherapy is unlikely to benefit him. I recommend home hospice. Mr Cruz and his daughter/ANNETTE Khanna also want palliative care and hospice. Will refer to hospice.     2.  - tramadol not helping much  - norco q6h prn for pain    3.  - JAK2 positive  - aspirin and plavix for now. No Hydrea for now as it may worsen anemia     4.  - IVF and magnesium replacement today    5.  - c/w marinol    6.  - BP good  - c/w current meds           Distress Screening Results: Psychosocial Distress screening score of Distress Score: 0 noted and reviewed. No intervention indicated.

## 2018-11-13 NOTE — PLAN OF CARE
Problem: Patient Care Overview (Adult)  Goal: Plan of Care Review  Outcome: Ongoing (interventions implemented as appropriate)  Pt tolerated IVF and Mg rider without issue. VSS. AVS given.

## 2018-11-13 NOTE — PROGRESS NOTES
Received referral from the clinic that patient's daughter wanted to speak with me about home hospice services. Called Clemencia to speak to her about this. She was currently at work. She asked if we could speak more in the morning about it. I provided her with my direct number. She will contact me in the am to further discuss.

## 2018-11-14 NOTE — PROGRESS NOTES
Spoke with daughter, Clemencia, this am to discuss hospice services. Explained the services that hospice provides. She stated that she does not know of any family or friends that have used hospice services. We discussed informational visits with companies. She is agreeable to do informational visits with 3 companies. She did not express any preference with agencies. Sent referral for informational visit to [facility], [facility],and [facility]. I told her if she wanted to interview any others after that to let me know.

## 2018-11-15 NOTE — TELEPHONE ENCOUNTER
----- Message from Yasemin Cuellar sent at 11/15/2018  1:55 PM CST -----  Contact: Clemencia   Name of Who is Calling: Clmeencia patient's daughter       What is the request in detail: Clemencia patient's daughter is requesting a call from staff in regards to the patient's treatment status. Please contact to further discuss and advise      Can the clinic reply by [facility]: No       What Number to Call Back [facility] not [facility]: [phone] [facility] [id number]

## 2019-01-01 ENCOUNTER — TELEPHONE (OUTPATIENT)
Dept: HEMATOLOGY/ONCOLOGY | Facility: CLINIC | Age: 84
End: 2019-01-01

## 2019-01-03 NOTE — PLAN OF CARE
Assessment/Plan:  1  Closed nondisplaced fracture of middle phalanx of right ring finger with routine healing, subsequent encounter  XR finger right fourth digit-ring       Scribe Attestation    I,:   Jenny Berg MA am acting as a scribe while in the presence of the attending physician :        I,:   Otto Caballero,  personally performed the services described in this documentation    as scribed in my presence :              Kenyetta Goldberg is doing well postoperatively  She states [place] but has not been using her RUE  She was instructed to remain nonweightbearing with the RUE  A sterile dressing was applied  She will follow up in the [facility] today for a TKO ulnar gutter brace that she was instructed to wear at all times  She will follow up in 2 weeks for repeat x-ray and pin removal       Subjective:   Val Dennis is a 22 y o  female who presents to the office 2 weeks s/p CRPP right ring finger performed on 12/19/18  She states she is doing well postoperatively  She states she has been compliant with splint use but on Saturday she did get the splint wet and had to re-apply it herself  She also states she did have a fall off of a hay bale on 12/24/18 landing on her outstretched hands  Review of Systems   Constitutional: Negative for chills and fever  HENT: Negative for drooling and sneezing  Eyes: Negative for redness  Respiratory: Negative for cough and wheezing  Gastrointestinal: Negative for nausea and vomiting  Musculoskeletal: Negative for arthralgias, joint swelling and myalgias  Neurological: Negative for weakness and numbness  Psychiatric/Behavioral: Negative for behavioral problems  The patient is not nervous/anxious            Past Medical History:   Diagnosis Date    Acne     Anxiety     Post-op pain     pt wakes up very agitated from general anesthesia       Past Surgical History:   Procedure Laterality Date    COLONOSCOPY      EXAMINATION Problem: Patient Care Overview (Adult)  Goal: Plan of Care Review  Outcome: Ongoing (interventions implemented as appropriate)  Pt tolerated IVF today. VSS. AVS given.        UNDER ANESTHESIA N/A 11/6/2018    Procedure: EXAM UNDER ANESTHESIA (EUA)  WITH IUD REMOVAL;  Surgeon: Jessica Tom DO;  [facility];  Service: Gynecology    OK OPEN TX PHALANGEAL SHAFT FRACTURE PROX/MIDDLE EA Right 12/19/2018    Procedure: CLOSED REDUCTION PERCUTANEOUS PINNING P2 RIGHT RING FINGER;  Surgeon: Zay Romero DO;  Location: [address] [place] [address];  Service: Orthopedics    REMOVAL OF INTRAUTERINE DEVICE (IUD) N/A 11/6/2018    Procedure: REMOVAL OF INTRAUTERINE DEVICE (IUD); Surgeon: Jessica Tom DO;  [facility]: [facility];  Service: Gynecology    RHINOPLASTY  2013    WISDOM TOOTH EXTRACTION         Family History   Problem Relation Age of Onset    Colon cancer Maternal Grandmother     No Known Problems Mother     No Known Problems Father        Social History     Occupational History    Not on file  Social History Main Topics    Smoking status: Never Smoker    Smokeless tobacco: Never Used    Alcohol use Yes      Comment: occ    Drug use: No    Sexual activity: Yes         Current Outpatient Prescriptions:     ALPRAZolam (XANAX) 0 5 mg tablet, 4 (four) times a day as needed  , Disp: , Rfl:     Melatonin 1 MG CAPS, Take 1 mg by mouth, Disp: , Rfl:     Multiple Vitamin (MULTIVITAMIN) tablet, Take 1 tablet by mouth daily, Disp: , Rfl:     Allergies   Allergen Reactions    Keo Flavor      anaphylaxis    Pineapple Anaphylaxis     Anaphylaxis    Zithromax [Azithromycin] Anaphylaxis    Latex Rash       Objective: There were no vitals filed for this visit  Ortho Exam     Right Ring Finger    Pin sites clean no signs of infection  Sensation intact median, radial, and ulnar nerve  Compartments soft  Brisk capillary refill   No malrotation  Pins intact without loosening  Fdp/fds/ext intact      Physical Exam   Constitutional: She is oriented to person, place, and time  She appears well-developed and well-nourished  HENT:   Head: Normocephalic and atraumatic     Eyes: Conjunctivae are normal  Right eye exhibits no discharge  Left eye exhibits no discharge  Neck: Normal range of motion  Neck supple  Cardiovascular: Normal rate and intact distal pulses  Pulmonary/Chest: Effort normal  No respiratory distress  Neurological: She is alert and oriented to person, place, and time  Skin: Skin is warm and dry  Psychiatric: She has a normal mood and affect  Her behavior is normal  Judgment and thought content normal        I have personally reviewed pertinent films in PACS and my interpretation is as follows:X-ray right ring finger performed in the office today demonstrates orthopedic hardware intact

## 2019-01-16 NOTE — TELEPHONE ENCOUNTER
----- Message from Char Dobbins sent at 1/16/2019  9:38 AM CST -----  Contact: Clemencia pt daughter  Name of Who is Calling:Clemencia pt daughter      What is the request in detail: patient daughter would like nurse to fax handicap sticker paperwork states she misplaced last copy. Fax number is [phone] Attn: Clemencia Haddad     Can the clinic reply by [facility]: no    What [facility]: [phone] ext [phone]

## 2019-01-16 NOTE — TELEPHONE ENCOUNTER
Returned call and spoke with Ms Khanna, Mr Cruz's daughter. Mr Cruz misplaced the application for his  Handicap license plate or the handicap hang tag. Ms Khanna is asking for another application. She ask that we fax it to her @ [phone].     Rzpv. 2nd application completed and faxed to the above number given.

## 2019-01-17 NOTE — TELEPHONE ENCOUNTER
----- Message from Antonette Cooper sent at 1/17/2019 10:11 AM CST -----  Contact: Clemencia ( daughter )   Name of Who is Calling: Clemencia ( daughter )       What is the request in detail: Clemencia ( daughter ) is requesting a call in regards to paperwork she faxed to get a handicap sticker she states it can be faxed to [phone]... Please contact to further discuss and advise.       Can the clinic reply by [facility]: no       [facility]  [phone] ext [phone]

## 2019-01-17 NOTE — TELEPHONE ENCOUNTER
Returned call and spoke with Ms Khanna. Informed Ms Khanna the application for the Handicap Lic Plate has been completed and it has been faxed to the number given. She will call me back if she did not receive it.

## 2019-02-05 ENCOUNTER — TELEPHONE (OUTPATIENT)
Dept: HEMATOLOGY/ONCOLOGY | Facility: CLINIC | Age: 84
End: 2019-02-05

## 2019-02-05 NOTE — TELEPHONE ENCOUNTER
----- Message from Lyndsey Zabala sent at 2/5/2019  3:07 PM CST -----  Contact: pt's daughter  Name of Who is Calling: Clemencia      What is the request in detail: requesting a call back from Angela. In reference to her dad, the pt. Please call and advise      Can the clinic reply by [facility]: no        [facility] [phone]

## 2019-02-05 NOTE — TELEPHONE ENCOUNTER
Returned call and spoke with Ms Khanna. She calling to inform Dr Manzo and staff,  Anthony passed last week. Ms Khanna wanted to thank Dr Manzo and the staff for taking such good care of her father. She wanted to thanks everyone for being so kind to her Dad and her family.

## 2019-04-04 ENCOUNTER — PES CALL (OUTPATIENT)
Dept: ADMINISTRATIVE | Facility: CLINIC | Age: 84
End: 2019-04-04

## 2019-10-23 NOTE — TELEPHONE ENCOUNTER
----- Message from Christiano Bocanegra sent at 3/15/2018  9:39 AM CDT -----  Contact: Suyapa from [facility]  Suyapa from [facility] she is calling to  Inform doctor that pt is at [facility] location now. If need be, pt can be contacted at [phone] (cckr)      Protopic Counseling: Patient may experience a mild burning sensation during topical application. Protopic is not approved in children less than 2 years of age. There have been case reports of hematologic and skin malignancies in patients using topical calcineurin inhibitors although causality is questionable.

## 2020-01-06 NOTE — TELEPHONE ENCOUNTER

## 2020-02-14 ENCOUNTER — PES CALL (OUTPATIENT)
Dept: ADMINISTRATIVE | Facility: CLINIC | Age: 85
End: 2020-02-14

## 2022-01-27 NOTE — ASSESSMENT & PLAN NOTE
- As above - will discuss with IR to see if any would be amenable to biopsy.     Please see smoking cessation intake form. Patient states that she is currently taking prescription anti-depressants, she is allergic to the adhesive in nicotine patches, and is unable to chew gum. Though she does not care for the taste of lozenges, I have encouraged her to utilize 4 mg nicotine lozenges as prescribed at this time. She is interested in discussing Varenecline prescription at our next counseling session. I have also encouraged pt to begin wait times prior to smoking, move her cigarettes, and begin thinking of her personal reasons for wanting to quit smoking.

## 2022-10-25 NOTE — TELEPHONE ENCOUNTER
Daughter was calling to speak with Dr. Manzo about pt's cancer prognosis. She would like to know how long pt has to live? Is the treatment worth it? I informed pt that I will let Dr. Manzo know. Daughter voiced understanding.  I also explained pre procedure instructions for MRI and PET scan  with daughter. Daughter voiced understanding.   none

## 2023-02-09 NOTE — ED NOTES
Physical Therapy    Patient not seen in therapy.     On hold due to medical condition    Re-attempt plan: per established plan of care      Collaboration with YOAN Valle.  Pt with 2 seizures last night.  Discussion on plans to hold ROM and CRS-R at this time.  Therapy aide ROM discontinued at this time.  Will reassess next week.        OBJECTIVE                         Therapy procedure time and total treatment time can be found documented on the Time Entry flowsheet   Pt resting w/ eyes closed and in no obvious distress, NO respiratory distress noted. Respirations are even and unlabored. Skin is warm, dry and pink. VSS. Will continue to monitor closely.

## 2023-02-14 NOTE — PROGRESS NOTES
Psychiatry needs to get Aripiprazole authorized Subjective:       Patient ID: Neville Cruz is a 83 y.o. male.     Chief Complaint: follow up for metastatic lung adenocarcinoma     Diagnosis:  1. 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  2. 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      GENOMICS:  Pathology of ligular mass biopsy on 4/12/18: PD-L1 55%, EGFR wild-type. Not sufficient tissue for ALK and ROS1  Guardant 360 test 5/3/2018: 34.6% JAK2 V617F mutation, 0.2% TP53 W91 mutation. Neg for ALK and ROS1     Oncologic History:  1. Mr. Cruz is an 83 yo AAM with history of esophageal cancer s/p radiation in 1997, CAD s/p PCI in 1999, 2010 and 2016, CKD stage IV, who saw me on 4/19/18 for further management of newly diagnosed lung cancer. Smoked 2 PPD before but stopped in 1997 when his esophageal cancer was diagnosed. He initially presented to ED on 3/12/18 with abdominal cramping and diarrhea. CT A/P without contrast on 3/13/18 showed wall thickening and extending from the splenic flexure to the rectum. Innumerable pulmonary nodules and lytic lesions in the lumbosacral spine c/w metastatic disease. CT chest without contrast on 3/15/18 showed innumerable diffuse pulmonary nodules c/w metastatic disease. 8.8 cm opacity in the left upper lobe, highly suspicious for primary lung malignancy. There is also peribronchial and septal thickening in the ESTRELLITA suspicious for lympangitic carcinomatosis and trace pleural fluid is seen. Multiple lucent and sclerotic bone lesions c/w metastatic disease, with probably pathologic fracture of the left lateral 6th rib. He was on ASA and Plavix, therefore his procedures were scheduled as outpatient. Colonoscopy on 3/23/18 showed hemorrhoids and diverticulosis, but no masses. IR biopsy of the lingular mass was performed on 4/12/18. Result showed mucinous adenocarcinoma. Molecular testing was negative for  EGFR, PD-L1 55%. Not enough tissue to test for ALK and ROS1.   2. PET scan on 18 showed a primary lung mass left upper lobe invading the pre-vascular anterior mediastinum.  SUV max 7.34.  There are diffuse bone metastases seen throughout the entire central axial red marrow skeleton.  For index purposes medial left iliac bone SUV max 8.17.  There are diffuse lung metastases bilaterally.  Right posterior pleural based SUV max 4.02.  There is a left pleural effusion.  There is a small pericardial effusion.  MRI brain without contrast showed no overt intracranial metastases.   3. Keytruda started on 18. Was in the ED for dizziness from low BP. Improved after IVF. PET scan on 18 showed interim response. Cycle 4 on 18., cycle 5 on . Cycle 6 on , cycle 7 on 10/2/18  4. Cannot give Xgeva due to dental problems. He has teeth gotten loose and falling out, but unable to have dental work done due to previous radiation.      INTERVAL HISTORY:   Mr. Cruz returns today for continued treatment. Feels weak sometimes. IVF helps. Feels that he may need more IVF. +Right shoulder and left hip pain. Tramadol helps.      ECO     ROS:   See interval history, otherwise negative.     Physical Examination:   Vital signs reviewed.   Gen: well hydrated, well developed, in no acute distress.  HEENT: normocephalic, anicteric sclerae, PERRLA, EOMI, oropharynx clear  Neck: supple, no JVD, thyromegaly, cervical or supraclavicular LAD  Lungs: CTAB, no wheezes or rales  Heart: RRR, no M/R/G  Abdomen: soft, no tenderness, non-distended, spleen edge 3 cm below the left costal margin, no mass, or hernia. BS present  Ext: no clubbing, cyanosis, or edema  Neuro: alert and oriented x 4, no focal neuro deficit  Skin: no rash, erythema, open wound or ulcers  Psych: pleasant and appropriate mood and affect     Objective:      Diagnostic Tests:  PET scan 18:  Lung primary and metastases have all improved since the prior  study.    Index left upper lobe SUV max 2.11, previously 4.22.    Index medial left iliac bone SUV max 5.65, previously 7.17.    Index right lung metastasis background activity.     Laboratory Data:  Labs from today reviewed. Cr 1.7     Assessment/Plan:      1. Adenocarcinoma of lung, stage 4, left    2. Malignant neoplasm metastatic to right lung    3. Metastasis to bone    4. Encounter for antineoplastic immunotherapy    5. Essential thrombocythemia    6. Benign essential HTN    7. Aortic atherosclerosis    8. Ectatic aorta    9. Abdominal aortic aneurysm (AAA) without rupture    10. CKD (chronic kidney disease) stage 3, GFR 30-59 ml/min    11. Fatigue, unspecified type        1-4  - Doing well. Stable disease on Keytruda  - Keytruda cycle 7 today  - He has more weakness from Keytruda, and feels IVF helps. Will schedule IVF this Thursday and Friday, and every Mon/Thurs on weeks 2 and 3  - baseline echo as he is getting a lot of IVF  - cannot give Xgeva due to dental problems. Unable to get dental work done due to previous radiation.   - will restage in mid-November  - asked patient to take imodium prn for diarrhea from keytruda  - RTC in 3 weeks for cycle 8 of Keytruda     5.  - JAK2 positive  - aspirin and plavix for now. No Hydrea for now as it may worsen anemia     6.  - well controlled. C/w lotensin 40 mg and amlodipine 10 mg daily     7-9  - stable. C/w aspirin, plavix, pravachol    10.  - Cr slightly up  - IVF    11.  - monitor TSH as patient is on Keytruda

## 2023-02-20 NOTE — ED NOTES
Izabel from VA New York Harbor Healthcare System Breast Premier Health Miami Valley Lab notified of lactic acid needed

## 2024-07-16 NOTE — PROGRESS NOTES
Subjective:       Patient ID: Neville Cruz is a 83 y.o. male.     Chief Complaint: follow up for metastatic lung adenocarcinoma     Diagnosis:  1. 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  2. 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      GENOMICS:  Pathology of ligular mass biopsy on 4/12/18: PD-L1 55%, EGFR wild-type. Not sufficient tissue for ALK and ROS1  Guardant 360 test 5/3/2018: 34.6% JAK2 V617F mutation, 0.2% TP53 W91 mutation. Neg for ALK and ROS1     Oncologic History:  1. Mr. Cruz is an 81 yo AAM with history of esophageal cancer s/p radiation in 1997, CAD s/p PCI in 1999, 2010 and 2016, CKD stage IV, who saw me on 4/19/18 for further management of newly diagnosed lung cancer. Smoked 2 PPD before but stopped in 1997 when his esophageal cancer was diagnosed. He initially presented to ED on 3/12/18 with abdominal cramping and diarrhea. CT A/P without contrast on 3/13/18 showed wall thickening and extending from the splenic flexure to the rectum. Innumerable pulmonary nodules and lytic lesions in the lumbosacral spine c/w metastatic disease. CT chest without contrast on 3/15/18 showed innumerable diffuse pulmonary nodules c/w metastatic disease. 8.8 cm opacity in the left upper lobe, highly suspicious for primary lung malignancy. There is also peribronchial and septal thickening in the ESTRELLITA suspicious for lympangitic carcinomatosis and trace pleural fluid is seen. Multiple lucent and sclerotic bone lesions c/w metastatic disease, with probably pathologic fracture of the left lateral 6th rib. He was on ASA and Plavix, therefore his procedures were scheduled as outpatient. Colonoscopy on 3/23/18 showed hemorrhoids and diverticulosis, but no masses. IR biopsy of the lingular mass was performed on 4/12/18. Result showed mucinous adenocarcinoma. Molecular testing was negative for  EGFR, PD-L1 55%. Not enough tissue to test for ALK and ROS1.   2. PET scan on 18 showed a primary lung mass left upper lobe invading the pre-vascular anterior mediastinum.  SUV max 7.34.  There are diffuse bone metastases seen throughout the entire central axial red marrow skeleton.  For index purposes medial left iliac bone SUV max 8.17.  There are diffuse lung metastases bilaterally.  Right posterior pleural based SUV max 4.02.  There is a left pleural effusion.  There is a small pericardial effusion.  MRI brain without contrast showed no overt intracranial metastases.   3. Keytruda started on 18. Was in the ED for dizziness from low BP. Improved after IVF. PET scan on 18 showed interim response. Cycle 4 on 18.  4. Cannot give Xgeva due to dental problems. He has teeth gotten loose and falling out, but unable to have dental work done due to previous radiation.      INTERVAL HISTORY:   Mr. Cruz returns today for continued treatment. [place] tired this past weekend when shopping at [employer]. Otherwise feels good.      ECO     ROS:   See interval history, otherwise negative.     Physical Examination:   Vital signs reviewed.   Gen: well hydrated, well developed, in no acute distress.  HEENT: normocephalic, anicteric sclerae, PERRLA, EOMI, oropharynx clear  Neck: supple, no JVD, thyromegaly, cervical or supraclavicular LAD  Lungs: CTAB, no wheezes or rales  Heart: RRR, no M/R/G  Abdomen: soft, no tenderness, non-distended, spleen edge 3 cm below the left costal margin, no mass, or hernia. BS present  Ext: no clubbing, cyanosis, or edema  Neuro: alert and oriented x 4, no focal neuro deficit  Skin: no rash, erythema, open wound or ulcers  Psych: pleasant and appropriate mood and affect     Objective:      Diagnostic Tests:  PET scan 18:  There has been some but not complete response to therapy.    Index primary SUV max 4.22.    Index bone medial left ilium index SUV max 7.17.    Index right  pleural based lung nodule SUV max 4.02.     Laboratory Data:  Labs reviewed. Cr 2.0     Assessment/Plan:      1. Adenocarcinoma of lung, stage 4, left    2. Multiple lesions of metastatic malignancy in lumbosacral spine    3. Malignant neoplasm metastatic to right lung    4. Encounter for antineoplastic immunotherapy    5. CKD (chronic kidney disease) stage 3, GFR 30-59 ml/min    6. Essential thrombocythemia    7. Coronary artery disease involving native coronary artery of native heart without angina pectoris    8. Benign essential HTN        1-4  - Doing well.   - Keytruda cycle 5 today, return on Thursday and weekly starting next Tuesday for IVF  - cannot give Xgeva due to dental problems. Unable to get dental work done due to previous radiation.   - Due for restaging PET scan on return. Cannot get contrast with CT due to creatinine, therefore prefer PET scan    5.  - creatinine slightly worsened today  - IVF today, Thursday, then weekly starting next Tuesday    6.  - JAK2 positive  - aspirin and plavix for now. No Hydrea for now as it may worsen anemia     7.  - stable. C/w aspirin and plavix     8.  - stable. monitor     FOLLOW UP:  - Has more fatigue with Keytruda. Will do a 4-week treatment this time. PET scan on 9/7 for restaging, return on 9/11 for keytruda in the absence of progression     Body Location Override (Optional - Billing Will Still Be Based On Selected Body Map Location If Applicable): left upper back Detail Level: Detailed Depth Of Biopsy: dermis Was A Bandage Applied: Yes Size Of Lesion In Cm: 0 Biopsy Type: H and E Biopsy Method: Personna blade Anesthesia Type: 1% lidocaine with epinephrine Hemostasis: Aluminum Chloride Wound Care: Aquaphor Dressing: no dressing applied Destruction After The Procedure: No Type Of Destruction Used: Curettage Curettage Text: The wound bed was treated with curettage after the biopsy was performed. Cryotherapy Text: The wound bed was treated with cryotherapy after the biopsy was performed. Two freeze to thaw cycles were performed. Electrodesiccation Text: The wound bed was treated with electrodesiccation after the biopsy was performed. Size after first pass of curettage of cm. Electrodesiccation And Curettage Text: The wound bed was treated with electrodesiccation and curettage after the biopsy was performed. Silver Nitrate Text: The wound bed was treated with silver nitrate after the biopsy was performed. Lab: [id number] Lab Facility: [id number] Consent: Written consent was obtained and risks were reviewed including but not limited to scarring, infection, bleeding, scabbing, incomplete removal, and allergy to anesthesia. Post-Care Instructions: Detailed post-care instructions were reviewed with patient. Patient is to apply Vaseline or Aquaphor to biopsy site twice daily until healed. Patient instructed to call for issues with bleeding or for signs of infection. Notification Instructions: Patient will be notified of biopsy results. However, patient instructed to call the office if not contacted within 2 weeks. Billing Type: Third-Party Bill Information: Selecting Yes will display possible errors in your note based on the variables you have selected. This validation is only offered as a suggestion for you. PLEASE NOTE THAT THE VALIDATION TEXT WILL BE REMOVED WHEN YOU FINALIZE YOUR NOTE. IF YOU WANT TO FAX A PRELIMINARY NOTE YOU WILL NEED TO TOGGLE THIS TO 'NO' IF YOU DO NOT WANT IT IN YOUR FAXED NOTE. Body Location Override (Optional - Billing Will Still Be Based On Selected Body Map Location If Applicable): right lateral neck
